# Patient Record
Sex: FEMALE | Race: WHITE | NOT HISPANIC OR LATINO | Employment: OTHER | URBAN - NONMETROPOLITAN AREA
[De-identification: names, ages, dates, MRNs, and addresses within clinical notes are randomized per-mention and may not be internally consistent; named-entity substitution may affect disease eponyms.]

---

## 2017-01-04 DIAGNOSIS — M54.16 LUMBAR RADICULOPATHY: Primary | ICD-10-CM

## 2017-01-12 ENCOUNTER — HOSPITAL ENCOUNTER (OUTPATIENT)
Dept: INTERVENTIONAL RADIOLOGY/VASCULAR | Facility: HOSPITAL | Age: 52
Discharge: HOME | End: 2017-01-12
Attending: ORTHOPAEDIC SURGERY | Admitting: ORTHOPAEDIC SURGERY
Payer: MEDICARE

## 2017-01-12 PROCEDURE — 25000125 ZZHC RX 250: Performed by: RADIOLOGY

## 2017-01-12 PROCEDURE — 62323 NJX INTERLAMINAR LMBR/SAC: CPT | Mod: TC

## 2017-01-12 RX ORDER — LIDOCAINE HYDROCHLORIDE 10 MG/ML
INJECTION, SOLUTION EPIDURAL; INFILTRATION; INTRACAUDAL; PERINEURAL
Status: DISCONTINUED
Start: 2017-01-12 | End: 2017-01-12 | Stop reason: WASHOUT

## 2017-01-12 RX ORDER — METHYLPREDNISOLONE ACETATE 80 MG/ML
80 INJECTION, SUSPENSION INTRA-ARTICULAR; INTRALESIONAL; INTRAMUSCULAR; SOFT TISSUE ONCE
Status: COMPLETED | OUTPATIENT
Start: 2017-01-12 | End: 2017-01-12

## 2017-01-12 RX ORDER — METHYLPREDNISOLONE ACETATE 80 MG/ML
INJECTION, SUSPENSION INTRA-ARTICULAR; INTRALESIONAL; INTRAMUSCULAR; SOFT TISSUE
Status: DISCONTINUED
Start: 2017-01-12 | End: 2017-01-13 | Stop reason: HOSPADM

## 2017-01-12 RX ORDER — LIDOCAINE HYDROCHLORIDE 10 MG/ML
INJECTION, SOLUTION EPIDURAL; INFILTRATION; INTRACAUDAL; PERINEURAL
Status: DISCONTINUED
Start: 2017-01-12 | End: 2017-01-13 | Stop reason: HOSPADM

## 2017-01-12 RX ORDER — IOPAMIDOL 612 MG/ML
15 INJECTION, SOLUTION INTRATHECAL ONCE
Status: COMPLETED | OUTPATIENT
Start: 2017-01-12 | End: 2017-01-12

## 2017-01-12 RX ADMIN — METHYLPREDNISOLONE ACETATE 80 MG: 80 INJECTION, SUSPENSION INTRA-ARTICULAR; INTRALESIONAL; INTRAMUSCULAR; SOFT TISSUE at 13:44

## 2017-01-12 RX ADMIN — IOPAMIDOL 3 ML: 612 INJECTION, SOLUTION INTRATHECAL at 13:43

## 2017-01-12 NOTE — PROGRESS NOTES
Fluoro time for this case was 21 seconds, less than 5 min  Was patient held? NO  If yes, by whom?  NA and Technologist NAME

## 2017-01-12 NOTE — DISCHARGE INSTRUCTIONS
Home number on file [phone] (home)  Is it ok to leave a message if you are not home yes    Dr Roe completed your karon lumbar procedure on 1/12/2017.    Current Pain Level (0-10 Scale): 4/10  Post Pain Level (0-10):  2/10    Patient will be contacted by a diagnostic imaging nurse via telephone for follow up on pain levels in 2 weeks.    Radiology Discharge instructions for Steroid Injection    Activity Level:     Do not do any heavy activity or exercise for 24 hours.   Do not drive for 4 hours after your injection.  Diet:   Return to your normal diet.  Medications:   If you have stopped taking your Aspirin, Coumadin/Warfarin, Ibuprofen, or any   other blood thinner for this procedure you may resume in the morning unless   your primary care provider has given you other instructions.    Diabetics may see an increase in blood sugar after steroid injections. If you are concerned about your blood sugar, please contact your family doctor.    Site Care:  Remove the bandage and bathe or shower the morning after the procedure.      Call your Primary Care Provider if you have the following (if your primary care provider is not available please seek emergency care):   Nausea with vomiting   Severe headache   Drowsiness or confusion   Redness or drainage at the injection or puncture site   Temperature over 101 degrees F   Other concerns   Worsening back pain   Stiff neck    If you have any questions or concerns, please call [phone].

## 2017-01-12 NOTE — IP AVS SNAPSHOT
[facility]    [address] [place] [address]    Phone:  [phone]    Fax:  [phone]                                       After Visit Summary   1/12/2017    Elizabeth Rankin    MRN: [id number]           After Visit Summary Signature Page     I have received my discharge instructions, and my questions have been answered. I have discussed any challenges I see with this plan with the nurse or doctor.    ..........................................................................................................................................  Patient/Patient Representative Signature      ..........................................................................................................................................  Patient Representative Print Name and Relationship to Patient    ..................................................               ................................................  Date                                            Time    ..........................................................................................................................................  Reviewed by Signature/Title    ...................................................              ..............................................  Date                                                            Time

## 2017-01-12 NOTE — IP AVS SNAPSHOT
MRN:[id number]                      After Visit Summary   1/12/2017    Elizabeth Rankin    MRN: [id number]           Visit Information        Provider Department      1/12/2017  1:00 PM Radiologist, Need Interventional; [facility]           Review of your medicines      UNREVIEWED medicines. Ask your doctor about these medicines        Dose / Directions    adalimumab 40 MG/0.8ML prefilled syringe kit   Commonly known as:  HUMIRA        Dose:  40 mg   Inject 40 mg Subcutaneous every 7 days   Refills:  0       fluticasone 50 MCG/ACT spray   Commonly known as:  FLONASE   Used for:  Dysfunction of eustachian tube, bilateral        Dose:  2 spray   Spray 2 sprays into both nostrils daily   Quantity:  1 Bottle   Refills:  11       IRON SUPPLEMENT PO        Dose:  325 mg   Take 325 mg by mouth daily (with breakfast)   Refills:  0       metFORMIN 1000 MG tablet   Commonly known as:  GLUCOPHAGE        Dose:  1 tablet   Take 1 tablet by mouth 2 times daily (with meals).   Refills:  0       MULTIVITAMIN PO        Dose:  1 tablet   Take 1 tablet by mouth daily   Refills:  0       VITAMIN D3 PO        Dose:  5000 Units   Take 5,000 Units by mouth daily   Refills:  0                Protect others around you: Learn how to safely use, store and throw away your medicines at www.disposemymeds.org.         Follow-ups after your visit        Your next 10 appointments already scheduled     Aug 01, 2017  9:00 AM   Hearing Evaluation with ZENAIDA Hernandez   [facility] ([facility])    [address] [place] [address] Gloria Hernandes [place] [id number] [phone]           Please see your medical professional for ear cleaning prior to this appointment if you believe wax buildup may be an issue. All patients are required to have a physician's order stating the medical reason for the hearing test. Your doctor can send an electronic order, use their own form or we have provided a form  (called Physician's Order for Audiology Services). It states that there is a medical reason for your exam. Without an order you may need to be rescheduled until the order can be obtained.               Care Instructions        Further instructions from your care team       Home number on file [phone] (home)  Is it ok to leave a message if you are not home yes    Dr Roe completed your karon lumbar procedure on 1/12/2017.    Current Pain Level (0-10 Scale): 4/10  Post Pain Level (0-10):  2/10    Patient will be contacted by a diagnostic imaging nurse via telephone for follow up on pain levels in 2 weeks.    Radiology Discharge instructions for Steroid Injection    Activity Level:     Do not do any heavy activity or exercise for 24 hours.   Do not drive for 4 hours after your injection.  Diet:   Return to your normal diet.  Medications:   If you have stopped taking your Aspirin, Coumadin/Warfarin, Ibuprofen, or any   other blood thinner for this procedure you may resume in the morning unless   your primary care provider has given you other instructions.    Diabetics may see an increase in blood sugar after steroid injections. If you are concerned about your blood sugar, please contact your family doctor.    Site Care:  Remove the bandage and bathe or shower the morning after the procedure.      Call your Primary Care Provider if you have the following (if your primary care provider is not available please seek emergency care):   Nausea with vomiting   Severe headache   Drowsiness or confusion   Redness or drainage at the injection or puncture site   Temperature over 101 degrees F   Other concerns   Worsening back pain   Stiff neck    If you have any questions or concerns, please call [phone].        Additional Information About Your Visit        [employer] Information     [employer] lets you send messages to your doctor, view your test results, renew your prescriptions, schedule appointments and more. To sign  "up, go to [email or web address] . Click on \"Log in\" on the left side of the screen, which will take you to the Welcome page. Then click on \"Sign up Now\" on the right side of the page.     You will be asked to enter the access code listed below, as well as some personal information. Please follow the directions to create your username and password.     Your access code is: [email or web address]  Expires: 2017  1:00 PM     Your access code will  in 90 days. If you need help or a new code, please call your [facility] clinic or [phone].        Care EveryWhere ID     This is your Care EveryWhere ID. This could be used by other organizations to access your [facility] medical records  [id number]         Primary Care Provider Office Phone # Fax #    Danae Yusuf [phone]      Thank you!     Thank you for choosing [facility] for your care. Our goal is always to provide you with excellent care. Hearing back from our patients is one way we can continue to improve our services. Please take a few minutes to complete the written survey that you may receive in the mail after you visit with us. Thank you!             Medication List: This is a list of all your medications and when to take them. Check marks below indicate your daily home schedule. Keep this list as a reference.      Medications           Morning Afternoon Evening Bedtime As Needed    adalimumab 40 MG/0.8ML prefilled syringe kit   Commonly known as:  HUMIRA   Inject 40 mg Subcutaneous every 7 days                                fluticasone 50 MCG/ACT spray   Commonly known as:  FLONASE   Spray 2 sprays into both nostrils daily                                IRON SUPPLEMENT PO   Take 325 mg by mouth daily (with breakfast)                                metFORMIN 1000 MG tablet   Commonly known as:  GLUCOPHAGE   Take 1 tablet by mouth 2 times daily (with meals).                                MULTIVITAMIN PO   Take 1 tablet by mouth " daily                                VITAMIN D3 PO   Take 5,000 Units by mouth daily

## 2017-01-12 NOTE — PROGRESS NOTES
Injection Documentation of Provider History    PER-PROVIDER HISTORY, Dr. schroeder:  Is this for treatment of acute herpes zoster, post herpetic neuralgia, post-decompressive radiculitis, or post-surgical scarring?   No  Does the patient have radiculopathy or sciatica?  Yes  How long has the patient had any physical therapy, home therapy or chiropractor care?  0 weeks.  How long has the patient taken NSaids or muscle relaxants?  1 weeks.  Is there any history of systemic/local infection or unstable medical conditions?  No

## 2017-02-03 DIAGNOSIS — M16.12 PRIMARY OSTEOARTHRITIS OF LEFT HIP: Primary | ICD-10-CM

## 2017-02-07 ENCOUNTER — HOSPITAL ENCOUNTER (OUTPATIENT)
Dept: INTERVENTIONAL RADIOLOGY/VASCULAR | Facility: HOSPITAL | Age: 52
Discharge: HOME | End: 2017-02-07
Attending: ORTHOPAEDIC SURGERY | Admitting: ORTHOPAEDIC SURGERY
Payer: MEDICARE

## 2017-02-07 PROCEDURE — 20610 DRAIN/INJ JOINT/BURSA W/O US: CPT | Mod: TC,LT

## 2017-02-07 PROCEDURE — 25000125 ZZHC RX 250

## 2017-02-07 PROCEDURE — 77002 NEEDLE LOCALIZATION BY XRAY: CPT | Mod: TC,LT

## 2017-02-07 RX ORDER — LIDOCAINE HYDROCHLORIDE 10 MG/ML
10 INJECTION, SOLUTION INFILTRATION; PERINEURAL ONCE
Status: COMPLETED | OUTPATIENT
Start: 2017-02-07 | End: 2017-02-07

## 2017-02-07 RX ORDER — TRIAMCINOLONE ACETONIDE 40 MG/ML
INJECTION, SUSPENSION INTRA-ARTICULAR; INTRAMUSCULAR
Status: DISPENSED
Start: 2017-02-07 | End: 2017-02-07

## 2017-02-07 RX ORDER — LIDOCAINE HYDROCHLORIDE 10 MG/ML
INJECTION, SOLUTION EPIDURAL; INFILTRATION; INTRACAUDAL; PERINEURAL
Status: DISPENSED
Start: 2017-02-07 | End: 2017-02-07

## 2017-02-07 RX ORDER — TRIAMCINOLONE ACETONIDE 40 MG/ML
40 INJECTION, SUSPENSION INTRA-ARTICULAR; INTRAMUSCULAR ONCE
Status: COMPLETED | OUTPATIENT
Start: 2017-02-07 | End: 2017-02-07

## 2017-02-07 RX ORDER — IOPAMIDOL 612 MG/ML
50 INJECTION, SOLUTION INTRAVASCULAR ONCE
Status: COMPLETED | OUTPATIENT
Start: 2017-02-07 | End: 2017-02-07

## 2017-02-07 RX ADMIN — LIDOCAINE HYDROCHLORIDE 8 ML: 10 INJECTION, SOLUTION EPIDURAL; INFILTRATION; INTRACAUDAL; PERINEURAL at 09:12

## 2017-02-07 RX ADMIN — IOPAMIDOL 3 ML: 612 INJECTION, SOLUTION INTRAVASCULAR at 09:11

## 2017-02-07 RX ADMIN — TRIAMCINOLONE ACETONIDE 40 MG: 40 INJECTION, SUSPENSION INTRA-ARTICULAR; INTRAMUSCULAR at 09:13

## 2017-02-07 NOTE — IP AVS SNAPSHOT
[facility]    [address] [place] [address]    Phone:  [phone]    Fax:  [phone]                                       After Visit Summary   2/7/2017    Elizabeth Rankin    MRN: [id number]           After Visit Summary Signature Page     I have received my discharge instructions, and my questions have been answered. I have discussed any challenges I see with this plan with the nurse or doctor.    ..........................................................................................................................................  Patient/Patient Representative Signature      ..........................................................................................................................................  Patient Representative Print Name and Relationship to Patient    ..................................................               ................................................  Date                                            Time    ..........................................................................................................................................  Reviewed by Signature/Title    ...................................................              ..............................................  Date                                                            Time

## 2017-02-07 NOTE — PROGRESS NOTES
Fluoro time for this case was 10 seconds, less than 5 min  Was patient held? NO  If yes, by whom?

## 2017-02-07 NOTE — DISCHARGE INSTRUCTIONS
Home number on file [phone] (home)  Is it ok to leave a message if you are not home   yes    Dr Roe completed your left hip injection procedure on 2/7/2017.    Current Pain Level (0-10 Scale): 3/10  Post Pain Level (0-10):  5/10    Patient will be contacted by a diagnostic imaging nurse via telephone for follow up on pain levels in 2 weeks.    Radiology Discharge instructions for Steroid Injection    Activity Level:     Do not do any heavy activity or exercise for 24 hours.   Hip Injections:  Do not drive for 4 hours after your injection.  Diet:   Return to your normal diet.  Medications:   If you have stopped taking your Aspirin, Coumadin/Warfarin, Ibuprofen, or any   other blood thinner for this procedure you may resume in the morning unless   your primary care provider has given you other instructions.    Diabetics may see an increase in blood sugar after steroid injections. If you are concerned about your blood sugar, please contact your family doctor.    Site Care:  Remove the bandage and bathe or shower the morning after the procedure.      Call your Primary Care Provider if you have the following (if your primary care provider is not available please seek emergency care):   Nausea with vomiting   Severe headache   Drowsiness or confusion   Redness or drainage at the injection or puncture site   Temperature over 101 degrees F   Other concerns   Increasing joint pain       If you have any questions or concerns, please call [phone].

## 2017-02-07 NOTE — IP AVS SNAPSHOT
MRN:[id number]                      After Visit Summary   2/7/2017    Elizabeth Rankin    MRN: [id number]           Visit Information        Provider Department      2/7/2017  8:30 AM Radiologist, Need Interventional; [facility]           Review of your medicines      UNREVIEWED medicines. Ask your doctor about these medicines        Dose / Directions    adalimumab 40 MG/0.8ML prefilled syringe kit   Commonly known as:  HUMIRA        Dose:  40 mg   Inject 40 mg Subcutaneous every 7 days   Refills:  0       fluticasone 50 MCG/ACT spray   Commonly known as:  FLONASE   Used for:  Dysfunction of eustachian tube, bilateral        Dose:  2 spray   Spray 2 sprays into both nostrils daily   Quantity:  1 Bottle   Refills:  11       IRON SUPPLEMENT PO        Dose:  325 mg   Take 325 mg by mouth daily (with breakfast)   Refills:  0       metFORMIN 1000 MG tablet   Commonly known as:  GLUCOPHAGE        Dose:  1 tablet   Take 1 tablet by mouth 2 times daily (with meals).   Refills:  0       MULTIVITAMIN PO        Dose:  1 tablet   Take 1 tablet by mouth daily   Refills:  0       VITAMIN D3 PO        Dose:  5000 Units   Take 5,000 Units by mouth daily   Refills:  0                Protect others around you: Learn how to safely use, store and throw away your medicines at www.disposemymeds.org.         Follow-ups after your visit        Your next 10 appointments already scheduled     Aug 01, 2017  9:00 AM   Hearing Evaluation with ZENAIDA Hernandez   [facility] ([facility])    [address] [place] [address] Gloria Hern[place] [id number] [phone]           Please see your medical professional for ear cleaning prior to this appointment if you believe wax buildup may be an issue. All patients are required to have a physician's order stating the medical reason for the hearing test. Your doctor can send an electronic order, use their own form or we have provided a form  (called Physician's Order for Audiology Services). It states that there is a medical reason for your exam. Without an order you may need to be rescheduled until the order can be obtained.               Care Instructions        Further instructions from your care team       Home number on file [phone] (home)  Is it ok to leave a message if you are not home   yes    Dr Roe completed your left hip injection procedure on 2/7/2017.    Current Pain Level (0-10 Scale): 3/10  Post Pain Level (0-10):  5/10    Patient will be contacted by a diagnostic imaging nurse via telephone for follow up on pain levels in 2 weeks.    Radiology Discharge instructions for Steroid Injection    Activity Level:     Do not do any heavy activity or exercise for 24 hours.   Hip Injections:  Do not drive for 4 hours after your injection.  Diet:   Return to your normal diet.  Medications:   If you have stopped taking your Aspirin, Coumadin/Warfarin, Ibuprofen, or any   other blood thinner for this procedure you may resume in the morning unless   your primary care provider has given you other instructions.    Diabetics may see an increase in blood sugar after steroid injections. If you are concerned about your blood sugar, please contact your family doctor.    Site Care:  Remove the bandage and bathe or shower the morning after the procedure.      Call your Primary Care Provider if you have the following (if your primary care provider is not available please seek emergency care):   Nausea with vomiting   Severe headache   Drowsiness or confusion   Redness or drainage at the injection or puncture site   Temperature over 101 degrees F   Other concerns   Increasing joint pain       If you have any questions or concerns, please call [phone].        Additional Information About Your Visit        [employer] Information     [employer] lets you send messages to your doctor, view your test results, renew your prescriptions, schedule appointments  "and more. To sign up, go to [email or web address] . Click on \"Log in\" on the left side of the screen, which will take you to the Welcome page. Then click on \"Sign up Now\" on the right side of the page.     You will be asked to enter the access code listed below, as well as some personal information. Please follow the directions to create your username and password.     Your access code is: [email or web address]  Expires: 2017  9:15 AM     Your access code will  in 90 days. If you need help or a new code, please call your [facility] clinic or [phone].        Care EveryWhere ID     This is your Care EveryWhere ID. This could be used by other organizations to access your [facility] medical records  [id number]         Primary Care Provider Office Phone # Fax #    Danae Yusuf [phone]      Thank you!     Thank you for choosing [facility] for your care. Our goal is always to provide you with excellent care. Hearing back from our patients is one way we can continue to improve our services. Please take a few minutes to complete the written survey that you may receive in the mail after you visit with us. Thank you!             Medication List: This is a list of all your medications and when to take them. Check marks below indicate your daily home schedule. Keep this list as a reference.      Medications           Morning Afternoon Evening Bedtime As Needed    adalimumab 40 MG/0.8ML prefilled syringe kit   Commonly known as:  HUMIRA   Inject 40 mg Subcutaneous every 7 days                                fluticasone 50 MCG/ACT spray   Commonly known as:  FLONASE   Spray 2 sprays into both nostrils daily                                IRON SUPPLEMENT PO   Take 325 mg by mouth daily (with breakfast)                                metFORMIN 1000 MG tablet   Commonly known as:  GLUCOPHAGE   Take 1 tablet by mouth 2 times daily (with meals).                                MULTIVITAMIN PO   Take 1 " tablet by mouth daily                                VITAMIN D3 PO   Take 5,000 Units by mouth daily

## 2017-02-14 ENCOUNTER — ALLIED HEALTH/NURSE VISIT (OUTPATIENT)
Dept: AUDIOLOGY | Facility: OTHER | Age: 52
End: 2017-02-14
Attending: AUDIOLOGIST
Payer: MEDICARE

## 2017-02-14 DIAGNOSIS — H90.3 SENSORINEURAL HEARING LOSS, BILATERAL: Primary | ICD-10-CM

## 2017-02-14 PROCEDURE — V5266 BATTERY FOR HEARING DEVICE: HCPCS

## 2017-02-20 DIAGNOSIS — M47.816 LUMBAR SPONDYLOSIS: Primary | ICD-10-CM

## 2017-02-21 ENCOUNTER — TELEPHONE (OUTPATIENT)
Dept: INTERVENTIONAL RADIOLOGY/VASCULAR | Facility: HOSPITAL | Age: 52
End: 2017-02-21

## 2017-02-27 ENCOUNTER — HOSPITAL ENCOUNTER (OUTPATIENT)
Dept: INTERVENTIONAL RADIOLOGY/VASCULAR | Facility: HOSPITAL | Age: 52
Discharge: HOME | End: 2017-02-27
Attending: PHYSICAL MEDICINE & REHABILITATION | Admitting: PHYSICAL MEDICINE & REHABILITATION
Payer: MEDICARE

## 2017-02-27 PROCEDURE — 25000128 H RX IP 250 OP 636: Performed by: RADIOLOGY

## 2017-02-27 PROCEDURE — 25000125 ZZHC RX 250: Performed by: RADIOLOGY

## 2017-02-27 PROCEDURE — 62323 NJX INTERLAMINAR LMBR/SAC: CPT | Mod: TC

## 2017-02-27 RX ORDER — METHYLPREDNISOLONE ACETATE 80 MG/ML
80 INJECTION, SUSPENSION INTRA-ARTICULAR; INTRALESIONAL; INTRAMUSCULAR; SOFT TISSUE ONCE
Status: COMPLETED | OUTPATIENT
Start: 2017-02-27 | End: 2017-02-27

## 2017-02-27 RX ORDER — LIDOCAINE HYDROCHLORIDE 10 MG/ML
INJECTION, SOLUTION EPIDURAL; INFILTRATION; INTRACAUDAL; PERINEURAL
Status: DISCONTINUED
Start: 2017-02-27 | End: 2017-02-28 | Stop reason: HOSPADM

## 2017-02-27 RX ORDER — IOPAMIDOL 612 MG/ML
15 INJECTION, SOLUTION INTRATHECAL ONCE
Status: COMPLETED | OUTPATIENT
Start: 2017-02-27 | End: 2017-02-27

## 2017-02-27 RX ORDER — METHYLPREDNISOLONE ACETATE 80 MG/ML
INJECTION, SUSPENSION INTRA-ARTICULAR; INTRALESIONAL; INTRAMUSCULAR; SOFT TISSUE
Status: DISCONTINUED
Start: 2017-02-27 | End: 2017-02-28 | Stop reason: HOSPADM

## 2017-02-27 RX ADMIN — LIDOCAINE HYDROCHLORIDE 3 ML: 10 INJECTION, SOLUTION EPIDURAL; INFILTRATION; INTRACAUDAL; PERINEURAL at 12:23

## 2017-02-27 RX ADMIN — METHYLPREDNISOLONE ACETATE 80 MG: 80 INJECTION, SUSPENSION INTRA-ARTICULAR; INTRALESIONAL; INTRAMUSCULAR; SOFT TISSUE at 12:23

## 2017-02-27 RX ADMIN — IOPAMIDOL 3 ML: 612 INJECTION, SOLUTION INTRATHECAL at 12:22

## 2017-02-27 NOTE — PROGRESS NOTES
Fluoro time for this case was 19 seconds, less than 5 min  Was patient held? NO  If yes, by whom? NA

## 2017-02-27 NOTE — DISCHARGE INSTRUCTIONS
Home number on file [phone] (home)  Is it ok to leave a message if you are not home yes    Dr Roe completed your karon lumbar procedure on 2/27/2017.    Current Pain Level (0-10 Scale): 4/10  Post Pain Level (0-10):  2/10    Patient will be contacted by a diagnostic imaging nurse via telephone for follow up on pain levels in 2 weeks.    Radiology Discharge instructions for Steroid Injection    Activity Level:     Do not do any heavy activity or exercise for 24 hours.   Do not drive for 4 hours after your injection.  Diet:   Return to your normal diet.  Medications:   If you have stopped taking your Aspirin, Coumadin/Warfarin, Ibuprofen, or any   other blood thinner for this procedure you may resume in the morning unless   your primary care provider has given you other instructions.    Diabetics may see an increase in blood sugar after steroid injections. If you are concerned about your blood sugar, please contact your family doctor.    Site Care:  Remove the bandage and bathe or shower the morning after the procedure.      Call your Primary Care Provider if you have the following (if your primary care provider is not available please seek emergency care):   Nausea with vomiting   Severe headache   Drowsiness or confusion   Redness or drainage at the injection or puncture site   Temperature over 101 degrees F   Other concerns   Worsening back pain   Stiff neck    If you have any questions or concerns, please call [phone].

## 2017-02-27 NOTE — IP AVS SNAPSHOT
[facility]    [address] [place] [address]    Phone:  [phone]    Fax:  [phone]                                       After Visit Summary   2/27/2017    Elizabeth Rankin    MRN: [id number]           After Visit Summary Signature Page     I have received my discharge instructions, and my questions have been answered. I have discussed any challenges I see with this plan with the nurse or doctor.    ..........................................................................................................................................  Patient/Patient Representative Signature      ..........................................................................................................................................  Patient Representative Print Name and Relationship to Patient    ..................................................               ................................................  Date                                            Time    ..........................................................................................................................................  Reviewed by Signature/Title    ...................................................              ..............................................  Date                                                            Time

## 2017-02-27 NOTE — IP AVS SNAPSHOT
MRN:[id number]                      After Visit Summary   2/27/2017    Elizabeth Rankin    MRN: [id number]           Visit Information        Provider Department      2/27/2017 11:30 AM Radiologist, Need Interventional; [facility] INTERVENTIONAL ROOM [facility] Inter[facility] Radiology           Review of your medicines      UNREVIEWED medicines. Ask your doctor about these medicines        Dose / Directions    adalimumab 40 MG/0.8ML prefilled syringe kit   Commonly known as:  HUMIRA        Dose:  40 mg   Inject 40 mg Subcutaneous every 7 days   Refills:  0       fluticasone 50 MCG/ACT spray   Commonly known as:  FLONASE   Used for:  Dysfunction of eustachian tube, bilateral        Dose:  2 spray   Spray 2 sprays into both nostrils daily   Quantity:  1 Bottle   Refills:  11       IRON SUPPLEMENT PO        Dose:  325 mg   Take 325 mg by mouth daily (with breakfast)   Refills:  0       metFORMIN 1000 MG tablet   Commonly known as:  GLUCOPHAGE        Dose:  1 tablet   Take 1 tablet by mouth 2 times daily (with meals).   Refills:  0       MULTIVITAMIN PO        Dose:  1 tablet   Take 1 tablet by mouth daily   Refills:  0       VITAMIN D3 PO        Dose:  5000 Units   Take 5,000 Units by mouth daily   Refills:  0                Protect others around you: Learn how to safely use, store and throw away your medicines at www.disposemymeds.org.         Follow-ups after your visit        Your next 10 appointments already scheduled     Aug 01, 2017  9:00 AM CDT   Hearing Evaluation with Garcia Hernandez   [facility] ([facility])    [address] [place] [address] Gloria [place] [address] [phone]           Please see your medical professional for ear cleaning prior to this appointment if you believe wax buildup may be an issue. All patients are required to have a physician's order stating the medical reason for the hearing test. Your doctor can send an electronic order, use their own form or we have provided a form  (called Physician's Order for Audiology Services). It states that there is a medical reason for your exam. Without an order you may need to be rescheduled until the order can be obtained.               Care Instructions        Further instructions from your care team       Home number on file [phone] (home)  Is it ok to leave a message if you are not home yes    Dr Roe completed your karon lumbar procedure on 2/27/2017.    Current Pain Level (0-10 Scale): 4/10  Post Pain Level (0-10):  2/10    Patient will be contacted by a diagnostic imaging nurse via telephone for follow up on pain levels in 2 weeks.    Radiology Discharge instructions for Steroid Injection    Activity Level:     Do not do any heavy activity or exercise for 24 hours.   Do not drive for 4 hours after your injection.  Diet:   Return to your normal diet.  Medications:   If you have stopped taking your Aspirin, Coumadin/Warfarin, Ibuprofen, or any   other blood thinner for this procedure you may resume in the morning unless   your primary care provider has given you other instructions.    Diabetics may see an increase in blood sugar after steroid injections. If you are concerned about your blood sugar, please contact your family doctor.    Site Care:  Remove the bandage and bathe or shower the morning after the procedure.      Call your Primary Care Provider if you have the following (if your primary care provider is not available please seek emergency care):   Nausea with vomiting   Severe headache   Drowsiness or confusion   Redness or drainage at the injection or puncture site   Temperature over 101 degrees F   Other concerns   Worsening back pain   Stiff neck    If you have any questions or concerns, please call [phone].        Additional Information About Your Visit        [facility] Information     [employer] lets you send messages to your doctor, view your test results, renew your prescriptions, schedule appointments and more. To sign  "up, go to [email or web address] . Click on \"Log in\" on the left side of the screen, which will take you to the Welcome page. Then click on \"Sign up Now\" on the right side of the page.     You will be asked to enter the access code listed below, as well as some personal information. Please follow the directions to create your username and password.     Your access code is: [email or web address]  Expires: 2017  9:15 AM     Your access code will  in 90 days. If you need help or a new code, please call your [facility] clinic or [phone].        Care EveryWhere ID     This is your Care EveryWhere ID. This could be used by other organizations to access your [facility] medical records  [id number]         Primary Care Provider Office Phone # Fax #    Danae Yusuf [phone]      Thank you!     Thank you for choosing [facility] for your care. Our goal is always to provide you with excellent care. Hearing back from our patients is one way we can continue to improve our services. Please take a few minutes to complete the written survey that you may receive in the mail after you visit with us. Thank you!             Medication List: This is a list of all your medications and when to take them. Check marks below indicate your daily home schedule. Keep this list as a reference.      Medications           Morning Afternoon Evening Bedtime As Needed    adalimumab 40 MG/0.8ML prefilled syringe kit   Commonly known as:  HUMIRA   Inject 40 mg Subcutaneous every 7 days                                fluticasone 50 MCG/ACT spray   Commonly known as:  FLONASE   Spray 2 sprays into both nostrils daily                                IRON SUPPLEMENT PO   Take 325 mg by mouth daily (with breakfast)                                metFORMIN 1000 MG tablet   Commonly known as:  GLUCOPHAGE   Take 1 tablet by mouth 2 times daily (with meals).                                MULTIVITAMIN PO   Take 1 tablet by mouth " daily                                VITAMIN D3 PO   Take 5,000 Units by mouth daily

## 2017-03-30 DIAGNOSIS — Z96.642 PRESENCE OF LEFT ARTIFICIAL HIP JOINT: ICD-10-CM

## 2017-03-30 DIAGNOSIS — M16.12 PRIMARY OSTEOARTHRITIS OF LEFT HIP: Primary | ICD-10-CM

## 2017-04-06 ENCOUNTER — HOSPITAL ENCOUNTER (OUTPATIENT)
Dept: INTERVENTIONAL RADIOLOGY/VASCULAR | Facility: HOSPITAL | Age: 52
Discharge: HOME | End: 2017-04-06
Attending: ORTHOPAEDIC SURGERY | Admitting: ORTHOPAEDIC SURGERY
Payer: MEDICARE

## 2017-04-06 PROCEDURE — 77002 NEEDLE LOCALIZATION BY XRAY: CPT | Mod: TC

## 2017-04-06 PROCEDURE — 25000125 ZZHC RX 250: Performed by: RADIOLOGY

## 2017-04-06 PROCEDURE — 20610 DRAIN/INJ JOINT/BURSA W/O US: CPT | Mod: TC,LT

## 2017-04-06 RX ORDER — LIDOCAINE HYDROCHLORIDE 10 MG/ML
10 INJECTION, SOLUTION INFILTRATION; PERINEURAL ONCE
Status: COMPLETED | OUTPATIENT
Start: 2017-04-06 | End: 2017-04-06

## 2017-04-06 RX ORDER — IOPAMIDOL 612 MG/ML
15 INJECTION, SOLUTION INTRATHECAL ONCE
Status: COMPLETED | OUTPATIENT
Start: 2017-04-06 | End: 2017-04-06

## 2017-04-06 RX ORDER — LIDOCAINE HYDROCHLORIDE 10 MG/ML
INJECTION, SOLUTION EPIDURAL; INFILTRATION; INTRACAUDAL; PERINEURAL
Status: DISCONTINUED
Start: 2017-04-06 | End: 2017-04-07 | Stop reason: HOSPADM

## 2017-04-06 RX ORDER — TRIAMCINOLONE ACETONIDE 40 MG/ML
40 INJECTION, SUSPENSION INTRA-ARTICULAR; INTRAMUSCULAR ONCE
Status: COMPLETED | OUTPATIENT
Start: 2017-04-06 | End: 2017-04-06

## 2017-04-06 RX ORDER — TRIAMCINOLONE ACETONIDE 40 MG/ML
INJECTION, SUSPENSION INTRA-ARTICULAR; INTRAMUSCULAR
Status: DISCONTINUED
Start: 2017-04-06 | End: 2017-04-07 | Stop reason: HOSPADM

## 2017-04-06 RX ADMIN — IOPAMIDOL 3 ML: 612 INJECTION, SOLUTION INTRATHECAL at 15:58

## 2017-04-06 RX ADMIN — TRIAMCINOLONE ACETONIDE 40 MG: 40 INJECTION, SUSPENSION INTRA-ARTICULAR; INTRAMUSCULAR at 15:58

## 2017-04-06 RX ADMIN — LIDOCAINE HYDROCHLORIDE 10 ML: 10 INJECTION, SOLUTION EPIDURAL; INFILTRATION; INTRACAUDAL; PERINEURAL at 15:57

## 2017-04-06 NOTE — IP AVS SNAPSHOT
[facility] Interventional [facility]iology    [address] [place] [address]    Phone:  [phone]    Fax:  [phone]                                       After Visit Summary   4/6/2017    Elizabeth Rankin    MRN: [id number]           After Visit Summary Signature Page     I have received my discharge instructions, and my questions have been answered. I have discussed any challenges I see with this plan with the nurse or doctor.    ..........................................................................................................................................  Patient/Patient Representative Signature      ..........................................................................................................................................  Patient Representative Print Name and Relationship to Patient    ..................................................               ................................................  Date                                            Time    ..........................................................................................................................................  Reviewed by Signature/Title    ...................................................              ..............................................  Date                                                            Time

## 2017-04-06 NOTE — PROGRESS NOTES
Fluoro time for this case was 45 seconds, less than 5 min  Was patient held? NO  If yes, by whom?

## 2017-04-06 NOTE — DISCHARGE INSTRUCTIONS
Home number on file [phone] (home)  Is it ok to leave a message if you are not home   yes    Dr Roe completed your left hip injection procedure on 4/6/2017.    Current Pain Level (0-10 Scale): 4/10  Post Pain Level (0-10):  4/10    Patient will be contacted by a diagnostic imaging nurse via telephone for follow up on pain levels in 2 weeks.    Radiology Discharge instructions for Steroid Injection    Activity Level:     Do not do any heavy activity or exercise for 24 hours.   Do not drive for 4 hours after your injection.  Diet:   Return to your normal diet.  Medications:   If you have stopped taking your Aspirin, Coumadin/Warfarin, Ibuprofen, or any   other blood thinner for this procedure you may resume in the morning unless   your primary care provider has given you other instructions.    Diabetics may see an increase in blood sugar after steroid injections. If you are concerned about your blood sugar, please contact your family doctor.    Site Care:  Remove the bandage and bathe or shower the morning after the procedure.      Call your Primary Care Provider if you have the following (if your primary care provider is not available please seek emergency care):   Nausea with vomiting   Severe headache   Drowsiness or confusion   Redness or drainage at the injection or puncture site   Temperature over 101 degrees F   Other concerns   Worsening back pain   Stiff neck

## 2017-04-06 NOTE — IP AVS SNAPSHOT
MRN:[id number]                      After Visit Summary   4/6/2017    Elizabeth Rankin    MRN: [id number]           Visit Information        Provider Department      4/6/2017  3:30 PM Radiologist, Need Interventional; [facility] INTERVENTIONAL ROOM [facility] Interventional Radiology           Review of your medicines      UNREVIEWED medicines. Ask your doctor about these medicines        Dose / Directions    adalimumab 40 MG/0.8ML prefilled syringe kit   Commonly known as:  HUMIRA        Dose:  40 mg   Inject 40 mg Subcutaneous every 7 days   Refills:  0       fluticasone 50 MCG/ACT spray   Commonly known as:  FLONASE   Used for:  Dysfunction of eustachian tube, bilateral        Dose:  2 spray   Spray 2 sprays into both nostrils daily   Quantity:  1 Bottle   Refills:  11       IRON SUPPLEMENT PO        Dose:  325 mg   Take 325 mg by mouth daily (with breakfast)   Refills:  0       metFORMIN 1000 MG tablet   Commonly known as:  GLUCOPHAGE        Dose:  1 tablet   Take 1 tablet by mouth 2 times daily (with meals).   Refills:  0       MULTIVITAMIN PO        Dose:  1 tablet   Take 1 tablet by mouth daily   Refills:  0       VITAMIN D3 PO        Dose:  5000 Units   Take 5,000 Units by mouth daily   Refills:  0                Protect others around you: Learn how to safely use, store and throw away your medicines at www.disposemymeds.org.         Follow-ups after your visit        Your next 10 appointments already scheduled     Aug 01, 2017  9:00 AM CDT   Hearing Evaluation with Garcia Hernandez   [facility] ([facility])    [address] [place] [address] [place] [address] [phone]           Please see your medical professional for ear cleaning prior to this appointment if you believe wax buildup may be an issue. All patients are required to have a physician's order stating the medical reason for the hearing test. Your doctor can send an electronic order, use their own form or we have provided a form  "(called Physician's Order for Audiology Services). It states that there is a medical reason for your exam. Without an order you may need to be rescheduled until the order can be obtained.               Care Instructions        Further instructions from your care team       Home number on file [phone] (home)  Is it ok to leave a message if you are not home   yes    Dr Roe completed your left hip injection procedure on 4/6/2017.    Current Pain Level (0-10 Scale): 4/10  Post Pain Level (0-10):  4/10    Patient will be contacted by a diagnostic imaging nurse via telephone for follow up on pain levels in 2 weeks.    Radiology Discharge instructions for Steroid Injection    Activity Level:     Do not do any heavy activity or exercise for 24 hours.   Do not drive for 4 hours after your injection.  Diet:   Return to your normal diet.  Medications:   If you have stopped taking your Aspirin, Coumadin/Warfarin, Ibuprofen, or any   other blood thinner for this procedure you may resume in the morning unless   your primary care provider has given you other instructions.    Diabetics may see an increase in blood sugar after steroid injections. If you are concerned about your blood sugar, please contact your family doctor.    Site Care:  Remove the bandage and bathe or shower the morning after the procedure.      Call your Primary Care Provider if you have the following (if your primary care provider is not available please seek emergency care):   Nausea with vomiting   Severe headache   Drowsiness or confusion   Redness or drainage at the injection or puncture site   Temperature over 101 degrees F   Other concerns   Worsening back pain   Stiff neck         Additional Information About Your Visit        [employer] Information     [employer] lets you send messages to your doctor, view your test results, renew your prescriptions, schedule appointments and more. To sign up, go to [email or web address] . Click on \"Log in\" on " "the left side of the screen, which will take you to the Welcome page. Then click on \"Sign up Now\" on the right side of the page.     You will be asked to enter the access code listed below, as well as some personal information. Please follow the directions to create your username and password.     Your access code is: [email or web address]  Expires: 2017 10:15 AM     Your access code will  in 90 days. If you need help or a new code, please call your [facility] clinic or [phone].        Care EveryWhere ID     This is your Care EveryWhere ID. This could be used by other organizations to access your [facility] medical records  [id number]         Primary Care Provider Office Phone # Fax #    Danae Yusuf [phone]      Thank you!     Thank you for choosing [facility] for your care. Our goal is always to provide you with excellent care. Hearing back from our patients is one way we can continue to improve our services. Please take a few minutes to complete the written survey that you may receive in the mail after you visit with us. Thank you!             Medication List: This is a list of all your medications and when to take them. Check marks below indicate your daily home schedule. Keep this list as a reference.      Medications           Morning Afternoon Evening Bedtime As Needed    adalimumab 40 MG/0.8ML prefilled syringe kit   Commonly known as:  HUMIRA   Inject 40 mg Subcutaneous every 7 days                                fluticasone 50 MCG/ACT spray   Commonly known as:  FLONASE   Spray 2 sprays into both nostrils daily                                IRON SUPPLEMENT PO   Take 325 mg by mouth daily (with breakfast)                                metFORMIN 1000 MG tablet   Commonly known as:  GLUCOPHAGE   Take 1 tablet by mouth 2 times daily (with meals).                                MULTIVITAMIN PO   Take 1 tablet by mouth daily                                VITAMIN D3 PO   Take " 5,000 Units by mouth daily

## 2017-04-13 ENCOUNTER — TRANSFERRED RECORDS (OUTPATIENT)
Dept: HEALTH INFORMATION MANAGEMENT | Facility: HOSPITAL | Age: 52
End: 2017-04-13

## 2017-04-20 ENCOUNTER — TELEPHONE (OUTPATIENT)
Dept: INTERVENTIONAL RADIOLOGY/VASCULAR | Facility: HOSPITAL | Age: 52
End: 2017-04-20

## 2017-04-21 DIAGNOSIS — M79.606 PAIN OF LOWER EXTREMITY, UNSPECIFIED LATERALITY: ICD-10-CM

## 2017-04-21 DIAGNOSIS — M47.816 SPONDYLOSIS OF LUMBAR REGION WITHOUT MYELOPATHY OR RADICULOPATHY: ICD-10-CM

## 2017-04-21 DIAGNOSIS — M54.16 LUMBAR RADICULOPATHY: Primary | ICD-10-CM

## 2017-04-23 DIAGNOSIS — M47.816 LUMBAR SPONDYLOSIS: Primary | ICD-10-CM

## 2017-05-10 ENCOUNTER — HOSPITAL ENCOUNTER (OUTPATIENT)
Dept: INTERVENTIONAL RADIOLOGY/VASCULAR | Facility: HOSPITAL | Age: 52
Discharge: HOME | End: 2017-05-10
Attending: PHYSICAL MEDICINE & REHABILITATION | Admitting: PHYSICAL MEDICINE & REHABILITATION
Payer: MEDICARE

## 2017-05-10 PROCEDURE — 25000128 H RX IP 250 OP 636: Performed by: RADIOLOGY

## 2017-05-10 PROCEDURE — 62323 NJX INTERLAMINAR LMBR/SAC: CPT | Mod: TC

## 2017-05-10 RX ORDER — IOPAMIDOL 612 MG/ML
15 INJECTION, SOLUTION INTRATHECAL ONCE
Status: COMPLETED | OUTPATIENT
Start: 2017-05-10 | End: 2017-05-10

## 2017-05-10 RX ORDER — METHYLPREDNISOLONE ACETATE 80 MG/ML
INJECTION, SUSPENSION INTRA-ARTICULAR; INTRALESIONAL; INTRAMUSCULAR; SOFT TISSUE
Status: DISCONTINUED
Start: 2017-05-10 | End: 2017-05-11 | Stop reason: HOSPADM

## 2017-05-10 RX ORDER — METHYLPREDNISOLONE ACETATE 80 MG/ML
80 INJECTION, SUSPENSION INTRA-ARTICULAR; INTRALESIONAL; INTRAMUSCULAR; SOFT TISSUE ONCE
Status: COMPLETED | OUTPATIENT
Start: 2017-05-10 | End: 2017-05-10

## 2017-05-10 RX ADMIN — IOPAMIDOL 6 ML: 612 INJECTION, SOLUTION INTRATHECAL at 14:07

## 2017-05-10 RX ADMIN — METHYLPREDNISOLONE ACETATE 80 MG: 80 INJECTION, SUSPENSION INTRA-ARTICULAR; INTRALESIONAL; INTRAMUSCULAR; SOFT TISSUE at 14:07

## 2017-05-10 NOTE — PROGRESS NOTES
Fluoro time for this case was 10 seconds, less than 5 min  Was patient held? NO  If yes, by whom? NA

## 2017-05-10 NOTE — IP AVS SNAPSHOT
[facility] Inter[facility]iology    [address] [place] [address]    Phone:  [phone]    Fax:  [phone]                                       After Visit Summary   5/10/2017    Elizabeth Rankin    MRN: [id number]           After Visit Summary Signature Page     I have received my discharge instructions, and my questions have been answered. I have discussed any challenges I see with this plan with the nurse or doctor.    ..........................................................................................................................................  Patient/Patient Representative Signature      ..........................................................................................................................................  Patient Representative Print Name and Relationship to Patient    ..................................................               ................................................  Date                                            Time    ..........................................................................................................................................  Reviewed by Signature/Title    ...................................................              ..............................................  Date                                                            Time

## 2017-05-10 NOTE — IP AVS SNAPSHOT
MRN:[id number]                      After Visit Summary   5/10/2017    Elizabeth Rankin    MRN: [id number]           Visit Information        Provider Department      5/10/2017  1:30 PM Radiologist, Need Interventional; [facility] INTERVENTIONAL ROOM [facility] Interventional Radiology           Review of your medicines      UNREVIEWED medicines. Ask your doctor about these medicines        Dose / Directions    adalimumab 40 MG/0.8ML prefilled syringe kit   Commonly known as:  HUMIRA        Dose:  40 mg   Inject 40 mg Subcutaneous every 7 days   Refills:  0       fluticasone 50 MCG/ACT spray   Commonly known as:  FLONASE   Used for:  Dysfunction of eustachian tube, bilateral        Dose:  2 spray   Spray 2 sprays into both nostrils daily   Quantity:  1 Bottle   Refills:  11       IRON SUPPLEMENT PO        Dose:  325 mg   Take 325 mg by mouth daily (with breakfast)   Refills:  0       metFORMIN 1000 MG tablet   Commonly known as:  GLUCOPHAGE        Dose:  1 tablet   Take 1 tablet by mouth 2 times daily (with meals).   Refills:  0       MULTIVITAMIN PO        Dose:  1 tablet   Take 1 tablet by mouth daily   Refills:  0       VITAMIN D3 PO        Dose:  5000 Units   Take 5,000 Units by mouth daily   Refills:  0                Protect others around you: Learn how to safely use, store and throw away your medicines at www.disposemymeds.org.         Follow-ups after your visit        Your next 10 appointments already scheduled     Aug 01, 2017  9:00 AM CDT   Hearing Evaluation with Garcia Hernandez   [facility] ([facility])    [address] [place] [address] [place] [address] [phone]           Please see your medical professional for ear cleaning prior to this appointment if you believe wax buildup may be an issue. All patients are required to have a physician's order stating the medical reason for the hearing test. Your doctor can send an electronic order, use their own form or we have provided a form  "(called Physician's Order for Audiology Services). It states that there is a medical reason for your exam. Without an order you may need to be rescheduled until the order can be obtained.               Care Instructions        Further instructions from your care team       Home number on file [phone] (home)  Is it ok to leave a message if you are not home yes    Dr. Jimenez completed your karon lumbar procedure on 5/10/2017.    Current Pain Level (0-10 Scale): 3/10  Post Pain Level (0-10):  1/10    Patient will be contacted by a diagnostic imaging nurse via telephone for follow up on pain levels in 2 weeks.    Radiology Discharge instructions for Steroid Injection    Activity Level:     Do not do any heavy activity or exercise for 24 hours.   Do not drive for 4 hours after your injection.  Diet:   Return to your normal diet.  Medications:   If you have stopped taking your Aspirin, Coumadin/Warfarin, Ibuprofen, or any   other blood thinner for this procedure you may resume in the morning unless   your primary care provider has given you other instructions.    Diabetics may see an increase in blood sugar after steroid injections. If you are concerned about your blood sugar, please contact your family doctor.    Site Care:  Remove the bandage and bathe or shower the morning after the procedure.      Call your Primary Care Provider if you have the following (if your primary care provider is not available please seek emergency care):   Nausea with vomiting   Severe headache   Drowsiness or confusion   Redness or drainage at the injection or puncture site   Temperature over 101 degrees F   Other concerns   Worsening back pain   Stiff neck       Additional Information About Your Visit        [employer] Information     [employer] lets you send messages to your doctor, view your test results, renew your prescriptions, schedule appointments and more. To sign up, go to [email or web address] . Click on \"Log in\" on the left side " "of the screen, which will take you to the Welcome page. Then click on \"Sign up Now\" on the right side of the page.     You will be asked to enter the access code listed below, as well as some personal information. Please follow the directions to create your username and password.     Your access code is: [email or web address]  Expires: 2017  2:04 PM     Your access code will  in 90 days. If you need help or a new code, please call your [facility] clinic or [phone].        Care EveryWhere ID     This is your Care EveryWhere ID. This could be used by other organizations to access your [facility] medical records  [id number]         Primary Care Provider Office Phone # Fax #    Danae Yusuf [phone]      Thank you!     Thank you for choosing [facility] for your care. Our goal is always to provide you with excellent care. Hearing back from our patients is one way we can continue to improve our services. Please take a few minutes to complete the written survey that you may receive in the mail after you visit with us. Thank you!             Medication List: This is a list of all your medications and when to take them. Check marks below indicate your daily home schedule. Keep this list as a reference.      Medications           Morning Afternoon Evening Bedtime As Needed    adalimumab 40 MG/0.8ML prefilled syringe kit   Commonly known as:  HUMIRA   Inject 40 mg Subcutaneous every 7 days                                fluticasone 50 MCG/ACT spray   Commonly known as:  FLONASE   Spray 2 sprays into both nostrils daily                                IRON SUPPLEMENT PO   Take 325 mg by mouth daily (with breakfast)                                metFORMIN 1000 MG tablet   Commonly known as:  GLUCOPHAGE   Take 1 tablet by mouth 2 times daily (with meals).                                MULTIVITAMIN PO   Take 1 tablet by mouth daily                                VITAMIN D3 PO   Take 5,000 Units by " mouth daily

## 2017-05-10 NOTE — DISCHARGE INSTRUCTIONS
Home number on file [phone] (home)  Is it ok to leave a message if you are not home yes    Dr. Jimenez completed your karon lumbar procedure on 5/10/2017.    Current Pain Level (0-10 Scale): 3/10  Post Pain Level (0-10):  1/10    Patient will be contacted by a diagnostic imaging nurse via telephone for follow up on pain levels in 2 weeks.    Radiology Discharge instructions for Steroid Injection    Activity Level:     Do not do any heavy activity or exercise for 24 hours.   Do not drive for 4 hours after your injection.  Diet:   Return to your normal diet.  Medications:   If you have stopped taking your Aspirin, Coumadin/Warfarin, Ibuprofen, or any   other blood thinner for this procedure you may resume in the morning unless   your primary care provider has given you other instructions.    Diabetics may see an increase in blood sugar after steroid injections. If you are concerned about your blood sugar, please contact your family doctor.    Site Care:  Remove the bandage and bathe or shower the morning after the procedure.      Call your Primary Care Provider if you have the following (if your primary care provider is not available please seek emergency care):   Nausea with vomiting   Severe headache   Drowsiness or confusion   Redness or drainage at the injection or puncture site   Temperature over 101 degrees F   Other concerns   Worsening back pain   Stiff neck

## 2017-05-10 NOTE — PROGRESS NOTES
Verified post-procedural status.  There were no complicationsand patient has no symptoms..  The patient had no questions or concerns.Patient reports pain scale of 1/10, and No bleeding.

## 2017-05-17 ENCOUNTER — ALLIED HEALTH/NURSE VISIT (OUTPATIENT)
Dept: AUDIOLOGY | Facility: OTHER | Age: 52
End: 2017-05-17
Attending: AUDIOLOGIST
Payer: MEDICARE

## 2017-05-17 DIAGNOSIS — H90.3 SENSORINEURAL HEARING LOSS, BILATERAL: Primary | ICD-10-CM

## 2017-05-17 PROCEDURE — V5266 BATTERY FOR HEARING DEVICE: HCPCS

## 2017-05-24 ENCOUNTER — TELEPHONE (OUTPATIENT)
Dept: INTERVENTIONAL RADIOLOGY/VASCULAR | Facility: HOSPITAL | Age: 52
End: 2017-05-24

## 2017-06-21 LAB
CHOLEST SERPL-MCNC: 282 MG/DL
HDLC SERPL-MCNC: 75 MG/DL
LDLC SERPL CALC-MCNC: 149 MG/DL
TRIGL SERPL-MCNC: 189 MG/DL

## 2017-07-11 ENCOUNTER — TRANSFERRED RECORDS (OUTPATIENT)
Dept: HEALTH INFORMATION MANAGEMENT | Facility: HOSPITAL | Age: 52
End: 2017-07-11

## 2017-07-12 ENCOUNTER — HOSPITAL ENCOUNTER (OUTPATIENT)
Facility: HOSPITAL | Age: 52
End: 2017-07-12
Attending: INTERNAL MEDICINE | Admitting: INTERNAL MEDICINE
Payer: MEDICARE

## 2017-08-01 ENCOUNTER — OFFICE VISIT (OUTPATIENT)
Dept: AUDIOLOGY | Facility: OTHER | Age: 52
End: 2017-08-01
Attending: AUDIOLOGIST
Payer: MEDICARE

## 2017-08-01 DIAGNOSIS — H90.3 SENSORINEURAL HEARING LOSS, BILATERAL: Primary | ICD-10-CM

## 2017-08-01 PROCEDURE — 92593 ZZHC HEARING AID CHECK, BINAURAL: CPT | Performed by: AUDIOLOGIST

## 2017-08-01 PROCEDURE — 92552 PURE TONE AUDIOMETRY AIR: CPT | Performed by: AUDIOLOGIST

## 2017-08-01 PROCEDURE — 92567 TYMPANOMETRY: CPT | Performed by: AUDIOLOGIST

## 2017-08-01 PROCEDURE — V5275 EAR IMPRESSION: HCPCS | Mod: RT | Performed by: AUDIOLOGIST

## 2017-08-01 PROCEDURE — 92556 SPEECH AUDIOMETRY COMPLETE: CPT | Performed by: AUDIOLOGIST

## 2017-08-01 NOTE — PROGRESS NOTES
Audiology Evaluation Completed. Hearing aid check completed.   Please refer SCANNED AUDIOGRAM and/or TYMPANOGRAM for BACKGROUND, RESULTS, RECOMMENDATIONS.    Ursula DANIELS, [facility]  Audiologist #[id number]

## 2017-08-01 NOTE — MR AVS SNAPSHOT
"              After Visit Summary   8/1/2017    Elizabeth Rankin    MRN: [id number]           Patient Information     Date Of Birth          1965        Visit Information        Provider Department      8/1/2017 9:15 AM Ursula Mcdowell AuD [facility]        Today's Diagnoses     Sensorineural hearing loss, bilateral    -  1       Follow-ups after your visit        Your next 10 appointments already scheduled     Aug 23, 2017  3:30 PM CDT   (Arrive by 3:15 PM)   Rr Ear Mold Fitting with Garcia Hernandez   [facility] ([facility] [place] )    [address]  [place] [id number] [phone]            Nov 06, 2017   Procedure with Alek Kimbrough MD   [facility] ([facility] )    [address] [place], [place] [address] [place] [address] [phone]              Who to contact     If you have questions or need follow up information about today's clinic visit or your schedule please contact [facility] directly at [phone].  Normal or non-critical lab and imaging results will be communicated to you by MyChart, letter or phone within 4 business days after the clinic has received the results. If you do not hear from us within 7 days, please contact the clinic through MyChart or phone. If you have a critical or abnormal lab result, we will notify you by phone as soon as possible.  Submit refill requests through [employer] or call your pharmacy and they will forward the refill request to us. Please allow 3 business days for your refill to be completed.          Additional Information About Your Visit        MyChart Information     [employer] lets you send messages to your doctor, view your test results, renew your prescriptions, schedule appointments and more. To sign up, go to [email or web address] . Click on \"Log in\" on the left side of the screen, which will take you to the Welcome page. Then click on \"Sign up Now\" on the right side of the page. "     You will be asked to enter the access code listed below, as well as some personal information. Please follow the directions to create your username and password.     Your access code is: [email or web address]  Expires: 2017  2:04 PM     Your access code will  in 90 days. If you need help or a new code, please call your [place] clinic or [phone].        Care EveryWhere ID     This is your Care EveryWhere ID. This could be used by other organizations to access your [place] medical records  [id number]         Blood Pressure from Last 3 Encounters:   10/31/16 130/76   16 148/88   16 122/82    Weight from Last 3 Encounters:   10/31/16 250 lb (113.4 kg)   16 250 lb (113.4 kg)   16 250 lb (113.4 kg)              We Performed the Following     AUDIOGRAM/TYMPANOGRAM - INTERFACE        Primary Care Provider Office Phone # Fax #    Danae Yusuf [phone]       [facility] [address] [facility] [address]        Equal Access to Services     [facility]: Hadii aad ku hadasho Soomaali, waaxda luqadaha, qaybta kaalmada adeegyada, omero arndt hayesthelan rocío gaytan . So [facility] [phone].    ATENCIÓN: Si habla español, tiene a lind disposición servicios gratuitos de asistencia lingüística. Llame al [phone].    We comply with applicable federal civil rights laws and [place] laws. We do not discriminate on the basis of race, color, national origin, age, disability sex, sexual orientation or gender identity.            Thank you!     Thank you for choosing [facility]  for your care. Our goal is always to provide you with excellent care. Hearing back from our patients is one way we can continue to improve our services. Please take a few minutes to complete the written survey that you may receive in the mail after your visit with us. Thank you!             Your Updated Medication List - Protect others around you: Learn how to safely  use, store and throw away your medicines at www.disposemymeds.org.          This list is accurate as of: 8/1/17 10:04 AM.  Always use your most recent med list.                   Brand Name Dispense Instructions for use Diagnosis    adalimumab 40 MG/0.8ML prefilled syringe kit    HUMIRA     Inject 40 mg Subcutaneous every 7 days        fluticasone 50 MCG/ACT spray    FLONASE    1 Bottle    Spray 2 sprays into both nostrils daily    Dysfunction of eustachian tube, bilateral       IRON SUPPLEMENT PO      Take 325 mg by mouth daily (with breakfast)        metFORMIN 1000 MG tablet    GLUCOPHAGE     Take 1 tablet by mouth 2 times daily (with meals).        MULTIVITAMIN PO      Take 1 tablet by mouth daily        VITAMIN D3 PO      Take 5,000 Units by mouth daily

## 2017-08-15 ENCOUNTER — OFFICE VISIT (OUTPATIENT)
Dept: AUDIOLOGY | Facility: OTHER | Age: 52
End: 2017-08-15
Attending: AUDIOLOGIST
Payer: MEDICARE

## 2017-08-15 DIAGNOSIS — H90.3 SENSORINEURAL HEARING LOSS, BILATERAL: Primary | ICD-10-CM

## 2017-08-15 PROCEDURE — V5264 EAR MOLD/INSERT: HCPCS | Mod: LT | Performed by: AUDIOLOGIST

## 2017-08-15 NOTE — MR AVS SNAPSHOT
"              After Visit Summary   8/15/2017    Elizabeth Rankin    MRN: [id number]           Patient Information     Date Of Birth          1965        Visit Information        Provider Department      8/15/2017 10:00 AM Ursula Mcdowell AuD [facility]        Today's Diagnoses     Sensorineural hearing loss, bilateral    -  1       Follow-ups after your visit        Your next 10 appointments already scheduled     Nov 06, 2017   Procedure with Alek Kimbrough MD   [facility] ([facility] )    [address] [place] [address] [phone]              Who to contact     If you have questions or need follow up information about today's clinic visit or your schedule please contact [facility] directly at [phone].  Normal or non-critical lab and imaging results will be communicated to you by MyChart, letter or phone within 4 business days after the clinic has received the results. If you do not hear from us within 7 days, please contact the clinic through MyChart or phone. If you have a critical or abnormal lab result, we will notify you by phone as soon as possible.  Submit refill requests through [employer] or call your pharmacy and they will forward the refill request to us. Please allow 3 business days for your refill to be completed.          Additional Information About Your Visit        [employer] Information     [employer] lets you send messages to your doctor, view your test results, renew your prescriptions, schedule appointments and more. To sign up, go to [email or web address] . Click on \"Log in\" on the left side of the screen, which will take you to the Welcome page. Then click on \"Sign up Now\" on the right side of the page.     You will be asked to enter the access code listed below, as well as some personal information. Please follow the directions to create your username and password.     Your access code is: [email or web address]  Expires: 11/13/2017 " 12:13 PM     Your access code will  in 90 days. If you need help or a new code, please call your [place] clinic or [phone].        Care EveryWhere ID     This is your Care EveryWhere ID. This could be used by other organizations to access your [place] medical records  [id number]         Blood Pressure from Last 3 Encounters:   10/31/16 130/76   16 148/88   16 122/82    Weight from Last 3 Encounters:   10/31/16 250 lb (113.4 kg)   16 250 lb (113.4 kg)   16 250 lb (113.4 kg)              Today, you had the following     No orders found for display       Primary Care Provider Office Phone # Fax #    Danae Yusuf [phone]       [facility] [address] [facility] [address]        Equal Access to Services     [facility] : Hadii aad ku hadasho Soomaali, waaxda luqadaha, qaybta kaalmada adeegyada, waxay joanin hayaan roíco gaytan . So [facility] [phone].    ATENCIÓN: Si habla español, tiene a lind disposición servicios gratuitos de asistencia lingüística. Jeniffer al [phone].    We comply with applicable federal civil rights laws and [place] laws. We do not discriminate on the basis of race, color, national origin, age, disability sex, sexual orientation or gender identity.            Thank you!     Thank you for choosing [facility]  for your care. Our goal is always to provide you with excellent care. Hearing back from our patients is one way we can continue to improve our services. Please take a few minutes to complete the written survey that you may receive in the mail after your visit with us. Thank you!             Your Updated Medication List - Protect others around you: Learn how to safely use, store and throw away your medicines at www.disposemymeds.org.          This list is accurate as of: 8/15/17 12:13 PM.  Always use your most recent med list.                   Brand Name Dispense Instructions for use  Diagnosis    adalimumab 40 MG/0.8ML prefilled syringe kit    HUMIRA     Inject 40 mg Subcutaneous every 7 days        fluticasone 50 MCG/ACT spray    FLONASE    1 Bottle    Spray 2 sprays into both nostrils daily    Dysfunction of eustachian tube, bilateral       IRON SUPPLEMENT PO      Take 325 mg by mouth daily (with breakfast)        metFORMIN 1000 MG tablet    GLUCOPHAGE     Take 1 tablet by mouth 2 times daily (with meals).        MULTIVITAMIN PO      Take 1 tablet by mouth daily        VITAMIN D3 PO      Take 5,000 Units by mouth daily

## 2017-08-15 NOTE — PROGRESS NOTES
Background:  Received custom Binaural Unitron c-shell earmolds with repair only warranty expiration: 9/2/19..  Custom c-shell eamrolds were ordered.      Procedures Performed:  Reprogrammed to user settings + 3dBHL per patient satisfaction.    Follow up:   Return to clinic as needed.        Ursula Mcdowell M.S., CCC-A  [place] Licensed Audiologist  #[id number]

## 2017-08-17 ENCOUNTER — ALLIED HEALTH/NURSE VISIT (OUTPATIENT)
Dept: AUDIOLOGY | Facility: OTHER | Age: 52
End: 2017-08-17
Attending: AUDIOLOGIST
Payer: MEDICARE

## 2017-08-17 DIAGNOSIS — H90.3 SENSORINEURAL HEARING LOSS, BILATERAL: Primary | ICD-10-CM

## 2017-08-17 PROCEDURE — V5266 BATTERY FOR HEARING DEVICE: HCPCS

## 2017-09-06 DIAGNOSIS — M25.552 LEFT HIP PAIN: Primary | ICD-10-CM

## 2017-09-14 ENCOUNTER — HOSPITAL ENCOUNTER (OUTPATIENT)
Dept: INTERVENTIONAL RADIOLOGY/VASCULAR | Facility: HOSPITAL | Age: 52
Discharge: HOME | End: 2017-09-14
Attending: FAMILY MEDICINE | Admitting: FAMILY MEDICINE
Payer: MEDICARE

## 2017-09-14 PROCEDURE — 20610 DRAIN/INJ JOINT/BURSA W/O US: CPT | Mod: TC,LT

## 2017-09-14 PROCEDURE — 77002 NEEDLE LOCALIZATION BY XRAY: CPT | Mod: TC

## 2017-09-14 PROCEDURE — 25000128 H RX IP 250 OP 636: Performed by: RADIOLOGY

## 2017-09-14 PROCEDURE — 25000125 ZZHC RX 250: Performed by: RADIOLOGY

## 2017-09-14 RX ORDER — METHYLPREDNISOLONE ACETATE 80 MG/ML
80 INJECTION, SUSPENSION INTRA-ARTICULAR; INTRALESIONAL; INTRAMUSCULAR; SOFT TISSUE ONCE
Status: COMPLETED | OUTPATIENT
Start: 2017-09-14 | End: 2017-09-14

## 2017-09-14 RX ORDER — LIDOCAINE HYDROCHLORIDE 10 MG/ML
INJECTION, SOLUTION EPIDURAL; INFILTRATION; INTRACAUDAL; PERINEURAL
Status: DISPENSED
Start: 2017-09-14 | End: 2017-09-14

## 2017-09-14 RX ORDER — METHYLPREDNISOLONE ACETATE 80 MG/ML
INJECTION, SUSPENSION INTRA-ARTICULAR; INTRALESIONAL; INTRAMUSCULAR; SOFT TISSUE
Status: DISPENSED
Start: 2017-09-14 | End: 2017-09-14

## 2017-09-14 RX ORDER — IOPAMIDOL 612 MG/ML
50 INJECTION, SOLUTION INTRAVASCULAR ONCE
Status: COMPLETED | OUTPATIENT
Start: 2017-09-14 | End: 2017-09-14

## 2017-09-14 RX ADMIN — METHYLPREDNISOLONE ACETATE 80 MG: 80 INJECTION, SUSPENSION INTRA-ARTICULAR; INTRALESIONAL; INTRAMUSCULAR; SOFT TISSUE at 11:57

## 2017-09-14 RX ADMIN — IOPAMIDOL 2 ML: 612 INJECTION, SOLUTION INTRAVASCULAR at 11:56

## 2017-09-14 RX ADMIN — LIDOCAINE HYDROCHLORIDE 4 ML: 10 INJECTION, SOLUTION EPIDURAL; INFILTRATION; INTRACAUDAL; PERINEURAL at 11:56

## 2017-09-14 NOTE — PROGRESS NOTES
Fluoro time for this case was 4 seconds, less than 5 min  Was patient held? NO  If yes, by whom? NA

## 2017-09-14 NOTE — DISCHARGE INSTRUCTIONS
Home number on file [phone] (home)  Is it ok to leave a message at this number(s)? Yes    Dr. Jimenez completed your LEFT HIP INJECTION procedure on 9/14/2017.    Current Pain Level (0-10 Scale): 3/10  Post Pain Level (0-10):  3/10    Radiology Discharge instructions for Steroid Injection    Activity Level:     Do not do any heavy activity or exercise for 24 hours.   Do not drive for 4 hours after your injection.  Diet:   Return to your normal diet.  Medications:   If you have stopped taking your Aspirin, Coumadin/Warfarin, Ibuprofen, or any   other blood thinner for this procedure you may resume in the morning unless   your primary care provider has given you other instructions.    Diabetics may see an increase in blood sugar after steroid injections. If you are concerned about your blood sugar, please contact your family doctor.    Site Care:  Remove the bandage and bathe or shower the morning after the procedure.      Please allow two weeks to experience improvement in your pain.  If you have any further issues, please contact your provider.    Call your Primary Care Provider if you have the following (if your primary care provider is not available please seek emergency care):   Nausea with vomiting   Severe headache   Drowsiness or confusion   Redness or drainage at the injection or puncture site   Temperature over 101 degrees F   Other concerns   Worsening back pain   Stiff neck

## 2017-09-14 NOTE — IP AVS SNAPSHOT
MRN:[id number]                      After Visit Summary   9/14/2017    Elizabeth Rankin    MRN: [id number]           Visit Information        Provider Department      9/14/2017 11:30 AM Radiologist, Need Interventional; [facility]           Review of your medicines      UNREVIEWED medicines. Ask your doctor about these medicines        Dose / Directions    adalimumab 40 MG/0.8ML prefilled syringe kit   Commonly known as:  HUMIRA        Dose:  40 mg   Inject 40 mg Subcutaneous every 7 days   Refills:  0       fluticasone 50 MCG/ACT spray   Commonly known as:  FLONASE   Used for:  Dysfunction of eustachian tube, bilateral        Dose:  2 spray   Spray 2 sprays into both nostrils daily   Quantity:  1 Bottle   Refills:  11       IRON SUPPLEMENT PO        Dose:  325 mg   Take 325 mg by mouth daily (with breakfast)   Refills:  0       metFORMIN 1000 MG tablet   Commonly known as:  GLUCOPHAGE        Dose:  1 tablet   Take 1 tablet by mouth 2 times daily (with meals).   Refills:  0       MULTIVITAMIN PO        Dose:  1 tablet   Take 1 tablet by mouth daily   Refills:  0       VITAMIN D3 PO        Dose:  5000 Units   Take 5,000 Units by mouth daily   Refills:  0                Protect others around you: Learn how to safely use, store and throw away your medicines at www.disposemymeds.org.         Follow-ups after your visit        Your next 10 appointments already scheduled     Nov 06, 2017   Procedure with Alek Kimbrough MD   [facility] ([address] [id number] [phone]               Care Instructions        Further instructions from your care team       Home number on file [phone] (home)  Is it ok to leave a message at this number(s)? Yes    Dr. Jimenez completed your LEFT HIP INJECTION procedure on 9/14/2017.    Current Pain Level (0-10 Scale): 3/10  Post Pain Level (0-10):  3/10    Radiology  "Discharge instructions for Steroid Injection    Activity Level:     Do not do any heavy activity or exercise for 24 hours.   Do not drive for 4 hours after your injection.  Diet:   Return to your normal diet.  Medications:   If you have stopped taking your Aspirin, Coumadin/Warfarin, Ibuprofen, or any   other blood thinner for this procedure you may resume in the morning unless   your primary care provider has given you other instructions.    Diabetics may see an increase in blood sugar after steroid injections. If you are concerned about your blood sugar, please contact your family doctor.    Site Care:  Remove the bandage and bathe or shower the morning after the procedure.      Please allow two weeks to experience improvement in your pain.  If you have any further issues, please contact your provider.    Call your Primary Care Provider if you have the following (if your primary care provider is not available please seek emergency care):   Nausea with vomiting   Severe headache   Drowsiness or confusion   Redness or drainage at the injection or puncture site   Temperature over 101 degrees F   Other concerns   Worsening back pain   Stiff neck       Additional Information About Your Visit        [employer] Information     [employer] lets you send messages to your doctor, view your test results, renew your prescriptions, schedule appointments and more. To sign up, go to [email or web address] . Click on \"Log in\" on the left side of the screen, which will take you to the Welcome page. Then click on \"Sign up Now\" on the right side of the page.     You will be asked to enter the access code listed below, as well as some personal information. Please follow the directions to create your username and password.     Your access code is: [email or web address]  Expires: 2017 12:13 PM     Your access code will  in 90 days. If you need help or a new code, please call your [place] clinic or [phone].        Care EveryWhere ID  "    This is your Care EveryWhere ID. This could be used by other organizations to access your [place] medical records  [id number]         Primary Care Provider Office Phone # Fax #    Danae Yusuf [phone]      Equal Access to Services     [facility] : Hadii nathanael corral hadcaroleo Sokellali, waaxda luqadaha, qaybta kaalmada adeegyada, omero joanin hayaaregla fourniermarie agarwal la'esthelaregla morillo. So [facility] [phone].    ATENCIÓN: Si habla español, tiene a lind disposición servicios gratuitos de asistencia lingüística. Llame al [phone].    We comply with applicable federal civil rights laws and [place] laws. We do not discriminate on the basis of race, color, national origin, age, disability sex, sexual orientation or gender identity.            Thank you!     Thank you for choosing [facility] for your care. Our goal is always to provide you with excellent care. Hearing back from our patients is one way we can continue to improve our services. Please take a few minutes to complete the written survey that you may receive in the mail after you visit with us. Thank you!             Medication List: This is a list of all your medications and when to take them. Check marks below indicate your daily home schedule. Keep this list as a reference.      Medications           Morning Afternoon Evening Bedtime As Needed    adalimumab 40 MG/0.8ML prefilled syringe kit   Commonly known as:  HUMIRA   Inject 40 mg Subcutaneous every 7 days                                fluticasone 50 MCG/ACT spray   Commonly known as:  FLONASE   Spray 2 sprays into both nostrils daily                                IRON SUPPLEMENT PO   Take 325 mg by mouth daily (with breakfast)                                metFORMIN 1000 MG tablet   Commonly known as:  GLUCOPHAGE   Take 1 tablet by mouth 2 times daily (with meals).                                MULTIVITAMIN PO   Take 1 tablet by mouth daily                                VITAMIN D3 PO    Take 5,000 Units by mouth daily

## 2017-09-14 NOTE — IP AVS SNAPSHOT
[facility]    [address] [place] [address]    Phone:  [phone]    Fax:  [phone]                                       After Visit Summary   9/14/2017    Elizabeth Rankin    MRN: [id number]           After Visit Summary Signature Page     I have received my discharge instructions, and my questions have been answered. I have discussed any challenges I see with this plan with the nurse or doctor.    ..........................................................................................................................................  Patient/Patient Representative Signature      ..........................................................................................................................................  Patient Representative Print Name and Relationship to Patient    ..................................................               ................................................  Date                                            Time    ..........................................................................................................................................  Reviewed by Signature/Title    ...................................................              ..............................................  Date                                                            Time

## 2017-10-30 RX ORDER — UREA 10 %
500 LOTION (ML) TOPICAL DAILY
COMMUNITY
End: 2017-10-30 | Stop reason: HOSPADM

## 2017-11-17 ENCOUNTER — ALLIED HEALTH/NURSE VISIT (OUTPATIENT)
Dept: AUDIOLOGY | Facility: OTHER | Age: 52
End: 2017-11-17
Attending: AUDIOLOGIST
Payer: MEDICARE

## 2017-11-17 DIAGNOSIS — H90.5 SENSORINEURAL HEARING LOSS, UNILATERAL: Primary | ICD-10-CM

## 2017-11-17 PROCEDURE — V5266 BATTERY FOR HEARING DEVICE: HCPCS

## 2018-01-19 ENCOUNTER — TRANSFERRED RECORDS (OUTPATIENT)
Dept: HEALTH INFORMATION MANAGEMENT | Facility: CLINIC | Age: 53
End: 2018-01-19

## 2018-01-19 LAB
ALT SERPL-CCNC: 44 U/L (ref 18–65)
AST SERPL-CCNC: 20 U/L (ref 10–30)
CREAT SERPL-MCNC: 0.61 MG/DL (ref 0.7–1.2)
GLUCOSE SERPL-MCNC: 141 MG/DL (ref 60–99)
HBA1C MFR BLD: 7.5 % (ref 4–6)
POTASSIUM SERPL-SCNC: 4.1 MEQ/L (ref 3.5–5.1)

## 2018-02-14 ENCOUNTER — TRANSFERRED RECORDS (OUTPATIENT)
Dept: HEALTH INFORMATION MANAGEMENT | Facility: CLINIC | Age: 53
End: 2018-02-14

## 2018-02-15 ENCOUNTER — OFFICE VISIT (OUTPATIENT)
Dept: OTOLARYNGOLOGY | Facility: OTHER | Age: 53
End: 2018-02-15
Attending: PHYSICIAN ASSISTANT
Payer: MEDICARE

## 2018-02-15 VITALS
WEIGHT: 250 LBS | BODY MASS INDEX: 42.68 KG/M2 | TEMPERATURE: 98 F | SYSTOLIC BLOOD PRESSURE: 142 MMHG | HEIGHT: 64 IN | HEART RATE: 84 BPM | OXYGEN SATURATION: 98 % | DIASTOLIC BLOOD PRESSURE: 76 MMHG

## 2018-02-15 DIAGNOSIS — H72.92 PERFORATION OF TYMPANIC MEMBRANE, LEFT: Primary | ICD-10-CM

## 2018-02-15 DIAGNOSIS — Z96.22 S/P BILATERAL MYRINGOTOMY WITH TUBE PLACEMENT: ICD-10-CM

## 2018-02-15 DIAGNOSIS — H69.93 DYSFUNCTION OF EUSTACHIAN TUBE, BILATERAL: ICD-10-CM

## 2018-02-15 DIAGNOSIS — H93.11 TINNITUS, RIGHT: ICD-10-CM

## 2018-02-15 DIAGNOSIS — H91.93 DECREASED HEARING OF BOTH EARS: Primary | ICD-10-CM

## 2018-02-15 DIAGNOSIS — H90.3 SENSORINEURAL HEARING LOSS (SNHL) OF BOTH EARS: ICD-10-CM

## 2018-02-15 DIAGNOSIS — Z45.89 TYMPANOSTOMY TUBE CHECK: ICD-10-CM

## 2018-02-15 PROCEDURE — G0463 HOSPITAL OUTPT CLINIC VISIT: HCPCS | Mod: 25

## 2018-02-15 PROCEDURE — 99213 OFFICE O/P EST LOW 20 MIN: CPT | Performed by: PHYSICIAN ASSISTANT

## 2018-02-15 PROCEDURE — G0463 HOSPITAL OUTPT CLINIC VISIT: HCPCS

## 2018-02-15 ASSESSMENT — PAIN SCALES - GENERAL: PAINLEVEL: NO PAIN (0)

## 2018-02-15 NOTE — NURSING NOTE
"Chief Complaint   Patient presents with     Consult     ear cleaning, tube fell out of ear.        Initial /76 (BP Location: Left arm, Patient Position: Chair, Cuff Size: Adult Large)  Pulse 84  Temp 98  F (36.7  C) (Tympanic)  Ht 5' 4\" (1.626 m)  Wt 250 lb (113.4 kg)  SpO2 98%  BMI 42.91 kg/m2 Estimated body mass index is 42.91 kg/(m^2) as calculated from the following:    Height as of this encounter: 5' 4\" (1.626 m).    Weight as of this encounter: 250 lb (113.4 kg).  Medication Reconciliation: complete       Margie Wolfe LPN      "

## 2018-02-15 NOTE — PATIENT INSTRUCTIONS
Tube is out on her left ear.   There is a hole (perforation)- left ear. WIll monitor.   Recheck in 4-6 weeks with audiogram    Right tube is extruding and small perforation around tube.   Monitor. Consider removal. Removing tube would leave a larger hole possibly worsening hearing.   Consider MRI of ears if no improvement.     Thank you for allowing BRIAN Prieto and our ENT team to participate in your care.  If your medications are too expensive, please give the nurse a call.  We can possibly change this medication.  If you have a scheduling or an appointment question please contact [facility] Coordinator at their direct line [phone].   ALL nursing questions or concerns can be directed to your ENT nurse at: [phone] Nicole

## 2018-02-15 NOTE — LETTER
2/15/2018         RE: Elizabeth Rankin  [address]   [facility] [address]        Dear Colleague,    Thank you for referring your patient, Elizabeth Rankin, to the [facility] Please see a copy of my visit note below.    Chief Complaint   Patient presents with     Consult     ear cleaning, tube fell out of ear.    Fern presents with ear concerns. She has a hx of BTTI with Dr. Ruelas on 9/30/16. She was seen S/p BTT by MARICHUY NP on 10/31/16 with normal postop exam.   She has not been seen since her initial postop. She was at her PCP's office yesterday, and noted left TM perforation. She has noticed a slight decrease in her hearing (left) for about 1 month. Patient did have pulsatile tinnitus in her right ear as well.   She denies active otorrhea, but does feel left ear has moisture at times.   Bilateral hearing aids with good success.   Denies otalgia.   Audiogram from 8/2017 reviewed with patient.   No flux HL or vertigo    Past Medical History:   Diagnosis Date     Bicuspid aortic valve      Bicuspid aortic valve      Depression      DM type 2 (diabetes mellitus, type 2) (H)      Hyperlipidemia      Nondependent alcohol abuse, unspecified drinking b 1/2/2001        Allergies   Allergen Reactions     Erythromycin Hives     Able to tolerate Zithromax:  Allergy     Meloxicam Swelling     Swelling of lower extremeties; chest pain     Oxycodone      Suppressed respirations     Percocet [Oxycodone-Acetaminophen] Itching     Ritalin [Methylphenidate] Other (See Comments)     Over stimulation     Ritalin [Methylphenidate]      Tramadol      sedation     Tylenol With Codeine [Acetaminophen-Codeine] Nausea     Vistaril [Hydroxyzine]      Dizzy, ankle swelling       Vortioxetine Nausea     Headache, neck pain     Strattera [Atomoxetine] Anxiety     Valium [Diazepam] Rash     Current Outpatient Prescriptions   Medication     Celecoxib (CELEBREX PO)     adalimumab (HUMIRA) 40 MG/0.8ML prefilled syringe kit      "metFORMIN (GLUCOPHAGE) 1000 MG tablet     fluticasone (FLONASE) 50 MCG/ACT nasal spray     Cholecalciferol (VITAMIN D3 PO)     Ferrous Sulfate (IRON SUPPLEMENT PO)     Multiple Vitamins-Minerals (MULTIVITAMIN PO)     No current facility-administered medications for this visit.       ROS: 10 point ROS neg other than the symptoms noted above in the HPI.  /76 (BP Location: Left arm, Patient Position: Chair, Cuff Size: Adult Large)  Pulse 84  Temp 98  F (36.7  C) (Tympanic)  Ht 5' 4\" (1.626 m)  Wt 250 lb (113.4 kg)  SpO2 98%  BMI 42.91 kg/m2  General - The patient is well nourished and well developed, and appears to have good nutritional status.  Alert and oriented to person and place, interactive.  Head and Face - Normocephalic and atraumatic, with no gross asymmetry noted of the contour of the facial features.  The facial nerve is intact, with strong symmetric movements.  Neck-no palpable lymphadenopathy or thyroid mass.  Trachea is midline.  Eyes - Extraocular movements intact.   Ears- External auditory canals are patent, tympanic membranes- Left canal clear. Left TM appears with perforation, 20% Anterior inferior quadrant. Middle ear appears healthy.   Right TM appears with extruding tube, inferior to tube peritubal perforation.Tube appears in superior quadrant   Nose - Nasal mucosa is pink and moist with no abnormal mucus.  The septum was grossly midline aside from left septal spur with contact  turbinates enlarged with right jasen  No polyps, masses, or purulence noted on examination.   Mouth - Examination of the oral cavity shows pink, healthy, moist mucosa.  No lesions or ulceration noted.  The dentition are in good repair.  The tongue is mobile and midline.  Throat - The walls of the oropharynx were smooth, pink, moist, symmetric, and had no lesions or ulcerations.  The tonsillar pillars and soft palate were symmetric.  The uvula was midline on elevation.         ASSESSMENT:    ICD-10-CM    1. " Perforation of tympanic membrane, left H72.92    2. Tympanostomy tube check Z45.89     Right extruding w/ Peritubal perforation   3. S/p bilateral myringotomy with tube placement Z96.22    4. Sensorineural hearing loss (SNHL) of both ears H90.3    5. Dysfunction of Eustachian tube, bilateral H69.83    6. Tinnitus, right H93.11          Left tube is absent with Residual perforation, left. Will monitor at this juncture. Keep ears dry.   Hopefully TM will heal independently. Briefly reviewed surgical options, patient declines.   Will complete audiogram at f/u. May require aid adjustments until perforation resolves.     Right Tube is starting to uplift/ extrude. I did not remove tube at today's visit due to inferior peritubal perforation. I discussed with patient possible worsening of hearing with removal (due to larger TM perforation). Will allow tube to extrude independently.   Reviewed pulsatile tinnitus. Patient declined imaging at this time.     She agrees with this plan.   Keep ears dry    Perforations often heal on there own if given 6 months of observation.  Dry ear precautions are necessary during this time period.  If it does not heal, a tympanoplasty is usually recommended.  This, when successful, may improve hearing, diminish contamination from water exposure and therefore infection and also decrease the likelihood of cholesteatoma.  The risks and benefits of a tympanoplasty were described  along with necessary pre-operative precautions.   A brochure was shared with the patient today regarding the perforated eardrum.                Radhika Barron PA-C  ENT  [facility]  [phone]                                                        Again, thank you for allowing me to participate in the care of your patient.        Sincerely,        Radhika Barron PA-C

## 2018-02-15 NOTE — PROGRESS NOTES
Chief Complaint   Patient presents with     Consult     ear cleaning, tube fell out of ear.    Fern presents with ear concerns. She has a hx of BTTI with Dr. Ruelas on 9/30/16. She was seen S/p BTT by MARICHUY NP on 10/31/16 with normal postop exam.   She has not been seen since her initial postop. She was at her PCP's office yesterday, and noted left TM perforation. She has noticed a slight decrease in her hearing (left) for about 1 month. Patient did have pulsatile tinnitus in her right ear as well.   She denies active otorrhea, but does feel left ear has moisture at times.   Bilateral hearing aids with good success.   Denies otalgia.   Audiogram from 8/2017 reviewed with patient.   No flux HL or vertigo    Past Medical History:   Diagnosis Date     Bicuspid aortic valve      Bicuspid aortic valve      Depression      DM type 2 (diabetes mellitus, type 2) (H)      Hyperlipidemia      Nondependent alcohol abuse, unspecified drinking b 1/2/2001        Allergies   Allergen Reactions     Erythromycin Hives     Able to tolerate Zithromax:  Allergy     Meloxicam Swelling     Swelling of lower extremeties; chest pain     Oxycodone      Suppressed respirations     Percocet [Oxycodone-Acetaminophen] Itching     Ritalin [Methylphenidate] Other (See Comments)     Over stimulation     Ritalin [Methylphenidate]      Tramadol      sedation     Tylenol With Codeine [Acetaminophen-Codeine] Nausea     Vistaril [Hydroxyzine]      Dizzy, ankle swelling       Vortioxetine Nausea     Headache, neck pain     Strattera [Atomoxetine] Anxiety     Valium [Diazepam] Rash     Current Outpatient Prescriptions   Medication     Celecoxib (CELEBREX PO)     adalimumab (HUMIRA) 40 MG/0.8ML prefilled syringe kit     metFORMIN (GLUCOPHAGE) 1000 MG tablet     fluticasone (FLONASE) 50 MCG/ACT nasal spray     Cholecalciferol (VITAMIN D3 PO)     Ferrous Sulfate (IRON SUPPLEMENT PO)     Multiple Vitamins-Minerals (MULTIVITAMIN PO)     No current  "facility-administered medications for this visit.       ROS: 10 point ROS neg other than the symptoms noted above in the HPI.  /76 (BP Location: Left arm, Patient Position: Chair, Cuff Size: Adult Large)  Pulse 84  Temp 98  F (36.7  C) (Tympanic)  Ht 5' 4\" (1.626 m)  Wt 250 lb (113.4 kg)  SpO2 98%  BMI 42.91 kg/m2  General - The patient is well nourished and well developed, and appears to have good nutritional status.  Alert and oriented to person and place, interactive.  Head and Face - Normocephalic and atraumatic, with no gross asymmetry noted of the contour of the facial features.  The facial nerve is intact, with strong symmetric movements.  Neck-no palpable lymphadenopathy or thyroid mass.  Trachea is midline.  Eyes - Extraocular movements intact.   Ears- External auditory canals are patent, tympanic membranes- Left canal clear. Left TM appears with perforation, 20% Anterior inferior quadrant. Middle ear appears healthy.   Right TM appears with extruding tube, inferior to tube peritubal perforation.Tube appears in superior quadrant   Nose - Nasal mucosa is pink and moist with no abnormal mucus.  The septum was grossly midline aside from left septal spur with contact  turbinates enlarged with right jasen  No polyps, masses, or purulence noted on examination.   Mouth - Examination of the oral cavity shows pink, healthy, moist mucosa.  No lesions or ulceration noted.  The dentition are in good repair.  The tongue is mobile and midline.  Throat - The walls of the oropharynx were smooth, pink, moist, symmetric, and had no lesions or ulcerations.  The tonsillar pillars and soft palate were symmetric.  The uvula was midline on elevation.         ASSESSMENT:    ICD-10-CM    1. Perforation of tympanic membrane, left H72.92    2. Tympanostomy tube check Z45.89     Right extruding w/ Peritubal perforation   3. S/p bilateral myringotomy with tube placement Z96.22    4. Sensorineural hearing loss (SNHL) of " both ears H90.3    5. Dysfunction of Eustachian tube, bilateral H69.83    6. Tinnitus, right H93.11          Left tube is absent with Residual perforation, left. Will monitor at this juncture. Keep ears dry.   Hopefully TM will heal independently. Briefly reviewed surgical options, patient declines.   Will complete audiogram at f/u. May require aid adjustments until perforation resolves.     Right Tube is starting to uplift/ extrude. I did not remove tube at today's visit due to inferior peritubal perforation. I discussed with patient possible worsening of hearing with removal (due to larger TM perforation). Will allow tube to extrude independently.   Reviewed pulsatile tinnitus. Patient declined imaging at this time.     She agrees with this plan.   Keep ears dry    Perforations often heal on there own if given 6 months of observation.  Dry ear precautions are necessary during this time period.  If it does not heal, a tympanoplasty is usually recommended.  This, when successful, may improve hearing, diminish contamination from water exposure and therefore infection and also decrease the likelihood of cholesteatoma.  The risks and benefits of a tympanoplasty were described  along with necessary pre-operative precautions.   A brochure was shared with the patient today regarding the perforated eardrum.                Radhika Barron PA-C  ENT  [facility], [place]  [phone]

## 2018-02-22 ENCOUNTER — ALLIED HEALTH/NURSE VISIT (OUTPATIENT)
Dept: AUDIOLOGY | Facility: OTHER | Age: 53
End: 2018-02-22
Attending: AUDIOLOGIST
Payer: MEDICARE

## 2018-02-22 DIAGNOSIS — H90.3 SENSORINEURAL HEARING LOSS, BILATERAL: Primary | ICD-10-CM

## 2018-02-22 PROCEDURE — V5266 BATTERY FOR HEARING DEVICE: HCPCS

## 2018-03-22 ENCOUNTER — OFFICE VISIT (OUTPATIENT)
Dept: OTOLARYNGOLOGY | Facility: OTHER | Age: 53
End: 2018-03-22
Attending: PHYSICIAN ASSISTANT
Payer: MEDICARE

## 2018-03-22 ENCOUNTER — OFFICE VISIT (OUTPATIENT)
Dept: AUDIOLOGY | Facility: OTHER | Age: 53
End: 2018-03-22
Attending: AUDIOLOGIST
Payer: MEDICARE

## 2018-03-22 VITALS
SYSTOLIC BLOOD PRESSURE: 140 MMHG | HEART RATE: 84 BPM | BODY MASS INDEX: 42.68 KG/M2 | OXYGEN SATURATION: 97 % | HEIGHT: 64 IN | TEMPERATURE: 98.5 F | DIASTOLIC BLOOD PRESSURE: 80 MMHG | WEIGHT: 250 LBS

## 2018-03-22 DIAGNOSIS — H69.93 DYSFUNCTION OF EUSTACHIAN TUBE, BILATERAL: ICD-10-CM

## 2018-03-22 DIAGNOSIS — T85.618D NON-FUNCTIONING TYMPANOSTOMY TUBE, SUBSEQUENT ENCOUNTER: ICD-10-CM

## 2018-03-22 DIAGNOSIS — H90.8 MIXED HEARING LOSS, UNILATERAL: ICD-10-CM

## 2018-03-22 DIAGNOSIS — H90.A32 MIXED CONDUCTIVE AND SENSORINEURAL HEARING LOSS OF LEFT EAR WITH RESTRICTED HEARING OF RIGHT EAR: ICD-10-CM

## 2018-03-22 DIAGNOSIS — H69.92 DYSFUNCTION OF LEFT EUSTACHIAN TUBE: ICD-10-CM

## 2018-03-22 DIAGNOSIS — H90.3 SENSORINEURAL HEARING LOSS, BILATERAL: ICD-10-CM

## 2018-03-22 DIAGNOSIS — H72.92 PERFORATION OF TYMPANIC MEMBRANE, LEFT: Primary | ICD-10-CM

## 2018-03-22 DIAGNOSIS — H90.3 SENSORINEURAL HEARING LOSS (SNHL) OF BOTH EARS: ICD-10-CM

## 2018-03-22 PROCEDURE — G0463 HOSPITAL OUTPT CLINIC VISIT: HCPCS

## 2018-03-22 PROCEDURE — 92557 COMPREHENSIVE HEARING TEST: CPT | Performed by: AUDIOLOGIST

## 2018-03-22 PROCEDURE — 92567 TYMPANOMETRY: CPT | Performed by: AUDIOLOGIST

## 2018-03-22 PROCEDURE — 92593 ZZHC HEARING AID CHECK, BINAURAL: CPT | Performed by: AUDIOLOGIST

## 2018-03-22 PROCEDURE — 99213 OFFICE O/P EST LOW 20 MIN: CPT | Performed by: PHYSICIAN ASSISTANT

## 2018-03-22 ASSESSMENT — PAIN SCALES - GENERAL: PAINLEVEL: NO PAIN (0)

## 2018-03-22 NOTE — PROGRESS NOTES
Chief Complaint   Patient presents with     RECHECK     f/u left TM perf, Right extruding tube, left tube gone    Fern presents with ear concerns. She has a hx of BTTI with Dr. Ruelas on 9/30/16. She was seen S/p BTT by MARICHUY, NP on 10/31/16 with normal postop exam.   She was last seen by myself on 2/15/18; right tube was extruding and left TM perforation. Since, she has noted pulsatile tinnitus resolved. Her hearing remains decreased on left. Right ear mild fullness intermittently.     She denies active otorrhea, but does feel left ear has moisture at times.   Bilateral hearing aids with good success.   Denies otalgia.   Audiogram from 8/2017 reviewed with patient.   No flux HL or vertigo  Reviewed audiogram with patient today. Right ear stable, TYpe A tympanogram. Left ear with mixed loss, typ B large volume, c/w/ perforation.       Past Medical History:   Diagnosis Date     Bicuspid aortic valve      Bicuspid aortic valve      Depression      DM type 2 (diabetes mellitus, type 2) (H)      Hyperlipidemia      Nondependent alcohol abuse, unspecified drinking b 1/2/2001        Allergies   Allergen Reactions     Erythromycin Hives     Able to tolerate Zithromax:  Allergy     Meloxicam Swelling     Swelling of lower extremeties; chest pain     Oxycodone      Suppressed respirations     Percocet [Oxycodone-Acetaminophen] Itching     Ritalin [Methylphenidate] Other (See Comments)     Over stimulation     Ritalin [Methylphenidate]      Tramadol      sedation     Tylenol With Codeine [Acetaminophen-Codeine] Nausea     Vistaril [Hydroxyzine]      Dizzy, ankle swelling       Vortioxetine Nausea     Headache, neck pain     Strattera [Atomoxetine] Anxiety     Valium [Diazepam] Rash     Current Outpatient Prescriptions   Medication     SITagliptin Phosphate (JANUVIA PO)     Celecoxib (CELEBREX PO)     adalimumab (HUMIRA) 40 MG/0.8ML prefilled syringe kit     metFORMIN (GLUCOPHAGE) 1000 MG tablet     fluticasone (FLONASE)  "50 MCG/ACT nasal spray     Cholecalciferol (VITAMIN D3 PO)     Ferrous Sulfate (IRON SUPPLEMENT PO)     Multiple Vitamins-Minerals (MULTIVITAMIN PO)     No current facility-administered medications for this visit.       ROS: 10 point ROS neg other than the symptoms noted above in the HPI.  /80 (BP Location: Right arm, Patient Position: Chair, Cuff Size: Adult Large)  Pulse 84  Temp 98.5  F (36.9  C) (Tympanic)  Ht 5' 4\" (1.626 m)  Wt 250 lb (113.4 kg)  SpO2 97%  BMI 42.91 kg/m2  General - The patient is well nourished and well developed, and appears to have good nutritional status.  Alert and oriented to person and place, interactive.  Head and Face - Normocephalic and atraumatic, with no gross asymmetry noted of the contour of the facial features.  The facial nerve is intact, with strong symmetric movements.  Neck-no palpable lymphadenopathy or thyroid mass.  Trachea is midline.  Eyes - Extraocular movements intact.   Ears- External auditory canals are patent, tympanic membranes- Left canal clear. Left TM appears with perforation, 20-25% Anterior inferior quadrant. Middle ear appears healthy.   Right TM appears with extruded tube on superior quadrant. Tube removed with cupped forceps. TM intact without effusion or retraction.   Nose - Nasal mucosa is pink and moist with no abnormal mucus.  The septum was grossly midline aside from left septal spur with contact  turbinates enlarged with right jasen  No polyps, masses, or purulence noted on examination.   Mouth - Examination of the oral cavity shows pink, healthy, moist mucosa.  No lesions or ulceration noted.  The dentition are in good repair.  The tongue is mobile and midline.  Throat - The walls of the oropharynx were smooth, pink, moist, symmetric, and had no lesions or ulcerations.  The tonsillar pillars and soft palate were symmetric.  The uvula was midline on elevation.      ASSESSMENT:    ICD-10-CM    1. Perforation of tympanic membrane, left " H72.92    2. Non-functioning tympanostomy tube, subsequent encounter T85.768D     removed right tube   3. Dysfunction of Eustachian tube, bilateral H69.83    4. Sensorineural hearing loss (SNHL) of both ears H90.3    5. 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 Right ear looks well, no peroration/ effusion. Could consider tube in future however, I would expect no changes with hearing. Etc.   She does have perforation left, appears stable since her last visit. Reviewed options of watchful waiting vs. Tube placement/ myringoplasty in office. Declined procedure. At this time, will see if perforation heals independently.   Keep left ear dry        Radhika Barron PA-C  ENT  [facility], [place]  [phone]

## 2018-03-22 NOTE — PROGRESS NOTES
Audiology Evaluation Completed.     Hearing aid check completed.    Please refer SCANNED AUDIOGRAM and/or TYMPANOGRAM for BACKGROUND, RESULTS, RECOMMENDATIONS.    Ursula DANIELS, [facility]  Audiologist #[id number]

## 2018-03-22 NOTE — PATIENT INSTRUCTIONS
Tubes are out.   Right ear healed. No perforation  Stable hearing right.     Left perforation remains. Keep ear dry  Will allow perforation to heal. May consider tube placement/ patch.  Follow up in 2-3 months    Thank you for allowing BRIAN Prieto and our ENT team to participate in your care.  If your medications are too expensive, please give the nurse a call.  We can possibly change this medication.  If you have a scheduling or an appointment question please contact [facility] Unit Coordinator at their direct line [phone].   ALL nursing questions or concerns can be directed to your ENT nurse at: [phone] Nicole

## 2018-03-22 NOTE — MR AVS SNAPSHOT
After Visit Summary   3/22/2018    Elizabeth Rankin    MRN: [id number]           Patient Information     Date Of Birth          1965        Visit Information        Provider Department      3/22/2018 11:15 AM [facility]ron [facility]-C [facility]        Care Instructions    Tubes are out.   Right ear healed. No perforation  Stable hearing right.     Left perforation remains. Keep ear dry  Will allow perforation to heal. May consider tube placement/ patch.  Follow up in 2-3 months    Thank you for allowing BRIAN Prieto and our ENT team to participate in your care.  If your medications are too expensive, please give the nurse a call.  We can possibly change this medication.  If you have a scheduling or an appointment question please contact [facility] Unit Coordinator at their direct line [phone].   ALL nursing questions or concerns can be directed to your ENT nurse at: [phone] [facility]               Follow-ups after your visit        Who to contact     If you have questions or need follow up information about today's clinic visit or your schedule please contact [facility] directly at [phone].  Normal or non-critical lab and imaging results will be communicated to you by [employer], letter or phone within 4 business days after the clinic has received the results. If you do not hear from us within 7 days, please contact the clinic through [employer] or phone. If you have a critical or abnormal lab result, we will notify you by phone as soon as possible.  Submit refill requests through [email or web address] or call your pharmacy and they will forward the refill request to us. Please allow 3 business days for your refill to be completed.          Additional Information About Your Visit        MyChart Information     [email or web address] lets you send messages to your doctor, view your test results, renew your prescriptions, schedule appointments and more. To sign up, go to [email or web address] .  "Click on \"Log in\" on the left side of the screen, which will take you to the Welcome page. Then click on \"Sign up Now\" on the right side of the page.     You will be asked to enter the access code listed below, as well as some personal information. Please follow the directions to create your username and password.     Your access code is: [email or web address]  Expires: 2018  4:03 PM     Your access code will  in 90 days. If you need help or a new code, please call your [place] clinic or [phone].        Care EveryWhere ID     This is your Care EveryWhere ID. This could be used by other organizations to access your [place] medical records  [id number]        Your Vitals Were     Pulse Temperature Height Pulse Oximetry BMI (Body Mass Index)       84 98.5  F (36.9  C) (Tympanic) 5' 4\" (1.626 m) 97% 42.91 kg/m2        Blood Pressure from Last 3 Encounters:   18 140/80   02/15/18 142/76   10/31/16 130/76    Weight from Last 3 Encounters:   18 250 lb (113.4 kg)   02/15/18 250 lb (113.4 kg)   10/31/16 250 lb (113.4 kg)              Today, you had the following     No orders found for display       Primary Care Provider Office Phone # Fax #    Danae Yusuf [phone]       [facility] [address] [facility] [address]        Equal Access to Services     [facility] AH: Hadii aad ku hadasho Soomaali, waaxda luqadaha, qaybta kaalmada adeegyada, omero gaytan . So [facility] [phone].    ATENCIÓN: Si habla español, tiene a lind disposición servicios gratuitos de asistencia lingüística. Lldian al [phone].    We comply with applicable federal civil rights laws and [place] laws. We do not discriminate on the basis of race, color, national origin, age, disability, sex, sexual orientation, or gender identity.            Thank you!     Thank you for choosing [facility]  for your care. Our goal is always to provide you with excellent " care. Hearing back from our patients is one way we can continue to improve our services. Please take a few minutes to complete the written survey that you may receive in the mail after your visit with us. Thank you!             Your Updated Medication List - Protect others around you: Learn how to safely use, store and throw away your medicines at www.disposemymeds.org.          This list is accurate as of 3/22/18 11:31 AM.  Always use your most recent med list.                   Brand Name Dispense Instructions for use Diagnosis    adalimumab 40 MG/0.8ML prefilled syringe kit    HUMIRA     Inject 40 mg Subcutaneous every 7 days        CELEBREX PO           fluticasone 50 MCG/ACT spray    FLONASE    1 Bottle    Spray 2 sprays into both nostrils daily    Dysfunction of Eustachian tube, bilateral       IRON SUPPLEMENT PO      Take 325 mg by mouth daily (with breakfast)        JANUVIA PO      Take 25 mg by mouth 2 times daily        metFORMIN 1000 MG tablet    GLUCOPHAGE     Take 1 tablet by mouth 2 times daily (with meals).        MULTIVITAMIN PO      Take 1 tablet by mouth daily        VITAMIN D3 PO      Take 5,000 Units by mouth daily

## 2018-03-22 NOTE — MR AVS SNAPSHOT
"              After Visit Summary   3/22/2018    Elizabeth Rankin    MRN: [id number]           Patient Information     Date Of Birth          1965        Visit Information        Provider Department      3/22/2018 10:45 AM Ursula Mcdowell AuD [facility] [place]        Today's Diagnoses     Sensorineural hearing loss, bilateral        Mixed hearing loss, unilateral        Dysfunction of left eustachian tube           Follow-ups after your visit        Your next 10 appointments already scheduled     Mar 22, 2018 11:15 AM CDT   (Arrive by 11:00 AM)   Return Visit with Radhika Barron PA-C   [facility] [place] ([facility] )    [address]  [place] [address]   [phone]              Who to contact     If you have questions or need follow up information about today's clinic visit or your schedule please contact [facility] directly at [phone].  Normal or non-critical lab and imaging results will be communicated to you by MyChart, letter or phone within 4 business days after the clinic has received the results. If you do not hear from us within 7 days, please contact the clinic through MyChart or phone. If you have a critical or abnormal lab result, we will notify you by phone as soon as possible.  Submit refill requests through [employer] or call your pharmacy and they will forward the refill request to us. Please allow 3 business days for your refill to be completed.          Additional Information About Your Visit        MyChart Information     [employer] lets you send messages to your doctor, view your test results, renew your prescriptions, schedule appointments and more. To sign up, go to [email or web address] . Click on \"Log in\" on the left side of the screen, which will take you to the Welcome page. Then click on \"Sign up Now\" on the right side of the page.     You will be asked to enter the access code listed below, as well as some personal information. " Please follow the directions to create your username and password.     Your access code is: [email or web address]  Expires: 2018  4:03 PM     Your access code will  in 90 days. If you need help or a new code, please call your [place] clinic or [phone].        Care EveryWhere ID     This is your Care EveryWhere ID. This could be used by other organizations to access your [place] medical records  [id number]         Blood Pressure from Last 3 Encounters:   02/15/18 142/76   10/31/16 130/76   16 148/88    Weight from Last 3 Encounters:   02/15/18 250 lb (113.4 kg)   10/31/16 250 lb (113.4 kg)   16 250 lb (113.4 kg)              We Performed the Following     AUDIOGRAM/TYMPANOGRAM - INTERFACE        Primary Care Provider Office Phone # Fax #    Danae Yusuf [phone]       [facility] [address] [facility] [address]        Equal Access to Services     [facility]: Hadii aad ku hadasho Soomaali, waaxda luqadaha, qaybta kaalmada adeegyada, waxay joanin haybenton gaytan . So [facility] [phone].    ATENCIÓN: Si habla español, tiene a lind disposición servicios gratuitos de asistencia lingüística. Llame al [phone].    We comply with applicable federal civil rights laws and [place] laws. We do not discriminate on the basis of race, color, national origin, age, disability, sex, sexual orientation, or gender identity.            Thank you!     Thank you for choosing [facility]  for your care. Our goal is always to provide you with excellent care. Hearing back from our patients is one way we can continue to improve our services. Please take a few minutes to complete the written survey that you may receive in the mail after your visit with us. Thank you!             Your Updated Medication List - Protect others around you: Learn how to safely use, store and throw away your medicines at www.disposemymeds.org.          This list is  accurate as of 3/22/18 11:04 AM.  Always use your most recent med list.                   Brand Name Dispense Instructions for use Diagnosis    adalimumab 40 MG/0.8ML prefilled syringe kit    HUMIRA     Inject 40 mg Subcutaneous every 7 days        CELEBREX PO           fluticasone 50 MCG/ACT spray    FLONASE    1 Bottle    Spray 2 sprays into both nostrils daily    Dysfunction of Eustachian tube, bilateral       IRON SUPPLEMENT PO      Take 325 mg by mouth daily (with breakfast)        JANUVIA PO      Take 25 mg by mouth 2 times daily        metFORMIN 1000 MG tablet    GLUCOPHAGE     Take 1 tablet by mouth 2 times daily (with meals).        MULTIVITAMIN PO      Take 1 tablet by mouth daily        VITAMIN D3 PO      Take 5,000 Units by mouth daily

## 2018-03-22 NOTE — LETTER
3/22/2018         RE: Elizabeth Rankin  [address]   [place] [address]        Dear Colleague,    Thank you for referring your patient, Elizabeth Rankin, to the [facility]. Please see a copy of my visit note below.    Chief Complaint   Patient presents with     RECHECK     f/u left TM perf, Right extruding tube, left tube gone    Fern presents with ear concerns. She has a hx of BTTI with Dr. Ruelas on 9/30/16. She was seen S/p BTT by MARICHUY, NP on 10/31/16 with normal postop exam.   She was last seen by myself on 2/15/18; right tube was extruding and left TM perforation. Since, she has noted pulsatile tinnitus resolved. Her hearing remains decreased on left. Right ear mild fullness intermittently.     She denies active otorrhea, but does feel left ear has moisture at times.   Bilateral hearing aids with good success.   Denies otalgia.   Audiogram from 8/2017 reviewed with patient.   No flux HL or vertigo  Reviewed audiogram with patient today. Right ear stable, TYpe A tympanogram. Left ear with mixed loss, typ B large volume, c/w/ perforation.       Past Medical History:   Diagnosis Date     Bicuspid aortic valve      Bicuspid aortic valve      Depression      DM type 2 (diabetes mellitus, type 2) (H)      Hyperlipidemia      Nondependent alcohol abuse, unspecified drinking b 1/2/2001        Allergies   Allergen Reactions     Erythromycin Hives     Able to tolerate Zithromax:  Allergy     Meloxicam Swelling     Swelling of lower extremeties; chest pain     Oxycodone      Suppressed respirations     Percocet [Oxycodone-Acetaminophen] Itching     Ritalin [Methylphenidate] Other (See Comments)     Over stimulation     Ritalin [Methylphenidate]      Tramadol      sedation     Tylenol With Codeine [Acetaminophen-Codeine] Nausea     Vistaril [Hydroxyzine]      Dizzy, ankle swelling       Vortioxetine Nausea     Headache, neck pain     Strattera [Atomoxetine] Anxiety     Valium [Diazepam] Rash  "    Current Outpatient Prescriptions   Medication     SITagliptin Phosphate (JANUVIA PO)     Celecoxib (CELEBREX PO)     adalimumab (HUMIRA) 40 MG/0.8ML prefilled syringe kit     metFORMIN (GLUCOPHAGE) 1000 MG tablet     fluticasone (FLONASE) 50 MCG/ACT nasal spray     Cholecalciferol (VITAMIN D3 PO)     Ferrous Sulfate (IRON SUPPLEMENT PO)     Multiple Vitamins-Minerals (MULTIVITAMIN PO)     No current facility-administered medications for this visit.       ROS: 10 point ROS neg other than the symptoms noted above in the HPI.  /80 (BP Location: Right arm, Patient Position: Chair, Cuff Size: Adult Large)  Pulse 84  Temp 98.5  F (36.9  C) (Tympanic)  Ht 5' 4\" (1.626 m)  Wt 250 lb (113.4 kg)  SpO2 97%  BMI 42.91 kg/m2  General - The patient is well nourished and well developed, and appears to have good nutritional status.  Alert and oriented to person and place, interactive.  Head and Face - Normocephalic and atraumatic, with no gross asymmetry noted of the contour of the facial features.  The facial nerve is intact, with strong symmetric movements.  Neck-no palpable lymphadenopathy or thyroid mass.  Trachea is midline.  Eyes - Extraocular movements intact.   Ears- External auditory canals are patent, tympanic membranes- Left canal clear. Left TM appears with perforation, 20-25% Anterior inferior quadrant. Middle ear appears healthy.   Right TM appears with extruded tube on superior quadrant. Tube removed with cupped forceps. TM intact without effusion or retraction.   Nose - Nasal mucosa is pink and moist with no abnormal mucus.  The septum was grossly midline aside from left septal spur with contact  turbinates enlarged with right jasen  No polyps, masses, or purulence noted on examination.   Mouth - Examination of the oral cavity shows pink, healthy, moist mucosa.  No lesions or ulceration noted.  The dentition are in good repair.  The tongue is mobile and midline.  Throat - The walls of the oropharynx " were smooth, pink, moist, symmetric, and had no lesions or ulcerations.  The tonsillar pillars and soft palate were symmetric.  The uvula was midline on elevation.      ASSESSMENT:    ICD-10-CM    1. Perforation of tympanic membrane, left H72.92    2. Non-functioning tympanostomy tube, subsequent encounter T85.618D     removed right tube   3. Dysfunction of Eustachian tube, bilateral H69.83    4. Sensorineural hearing loss (SNHL) of both ears H90.3    5. 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 Right ear looks well, no peroration/ effusion. Could consider tube in future however, I would expect no changes with hearing. Etc.   She does have perforation left, appears stable since her last visit. Reviewed options of watchful waiting vs. Tube placement/ myringoplasty in office. Declined procedure. At this time, will see if perforation heals independently.   Keep left ear dry        Radhika Barron PA-C  ENT  [facility] [place] [facility]  [phone]      Again, thank you for allowing me to participate in the care of your patient.        Sincerely,        Radhika Barron PA-C

## 2018-03-22 NOTE — NURSING NOTE
"Chief Complaint   Patient presents with     RECHECK     f/u left TM perf, Right extruding tube, left tube gone        Initial /80 (BP Location: Right arm, Patient Position: Chair, Cuff Size: Adult Large)  Pulse 84  Temp 98.5  F (36.9  C) (Tympanic)  Ht 5' 4\" (1.626 m)  Wt 250 lb (113.4 kg)  SpO2 97%  BMI 42.91 kg/m2 Estimated body mass index is 42.91 kg/(m^2) as calculated from the following:    Height as of this encounter: 5' 4\" (1.626 m).    Weight as of this encounter: 250 lb (113.4 kg).  Medication Reconciliation: complete       Margie Wolfe LPN      "

## 2018-04-06 ENCOUNTER — TELEPHONE (OUTPATIENT)
Dept: EDUCATION SERVICES | Facility: HOSPITAL | Age: 53
End: 2018-04-06

## 2018-04-06 DIAGNOSIS — E11.9 DM TYPE 2 (DIABETES MELLITUS, TYPE 2) (H): Primary | ICD-10-CM

## 2018-04-09 ENCOUNTER — HOSPITAL ENCOUNTER (OUTPATIENT)
Facility: HOSPITAL | Age: 53
End: 2018-04-09
Attending: INTERNAL MEDICINE | Admitting: INTERNAL MEDICINE
Payer: MEDICARE

## 2018-04-09 ENCOUNTER — HOSPITAL ENCOUNTER (OUTPATIENT)
Dept: EDUCATION SERVICES | Facility: HOSPITAL | Age: 53
Discharge: HOME | End: 2018-04-09
Attending: NURSE PRACTITIONER | Admitting: FAMILY MEDICINE
Payer: MEDICARE

## 2018-04-09 VITALS
HEART RATE: 99 BPM | HEIGHT: 64 IN | DIASTOLIC BLOOD PRESSURE: 84 MMHG | BODY MASS INDEX: 43.65 KG/M2 | SYSTOLIC BLOOD PRESSURE: 146 MMHG | RESPIRATION RATE: 20 BRPM | OXYGEN SATURATION: 97 % | WEIGHT: 255.7 LBS

## 2018-04-09 DIAGNOSIS — E11.65 TYPE 2 DIABETES MELLITUS WITH HYPERGLYCEMIA, WITHOUT LONG-TERM CURRENT USE OF INSULIN (H): Primary | ICD-10-CM

## 2018-04-09 PROCEDURE — G0108 DIAB MANAGE TRN  PER INDIV: HCPCS

## 2018-04-09 ASSESSMENT — PAIN SCALES - GENERAL: PAINLEVEL: MILD PAIN (3)

## 2018-04-09 NOTE — NURSING NOTE
"Chief Complaint   Patient presents with     Diabetes Education     Some medications may be causing side effects.        Initial /84  Pulse 99  Resp 20  Ht 1.626 m (5' 4\")  Wt 116 kg (255 lb 11.2 oz)  SpO2 97%  BMI 43.89 kg/m2 Estimated body mass index is 43.89 kg/(m^2) as calculated from the following:    Height as of this encounter: 1.626 m (5' 4\").    Weight as of this encounter: 116 kg (255 lb 11.2 oz).  Medication Reconciliation: complete   Arianna Clement      "

## 2018-04-09 NOTE — PROGRESS NOTES
"Elizabeth is here for diabetes review. /84  Pulse 99  Resp 20  Ht 1.626 m (5' 4\")  Wt 116 kg (255 lb 11.2 oz)  SpO2 97%  BMI 43.89 kg/m2 Elizabeth tells me today that she is bipolar and is dealing with the depression today so she is frustrated, angry, etc. Elizabeth is able to stay in our discussion pretty well, but frequently apologizes as she is cranky and doesn't mean to be difficult.    She is taking Metformin 1000mg bid. She had recently been prescribed Januvia, but she was unable to take it due to joint pain. She is unable to take Amaryl and Actos.    Elizabeth did not bring her meter today, but reports fasting BG 150s with today 195. Most recent A1C is 7.6% - 1/19/18. Elizabeth states that she knows that her BG and A1C are elevated, but wants to discuss diabetes medication options. She would prefer that insulin be her last option.    Elizabeth tells me that she is just unable to lose weight even when she is carefully counting carbs, keeping food logs and testing BG. She is very frustrated and she is told that she needs to lose weight for her diabetes and for her ankylosing spondylitis.    Elizabeth tells me that she knows what food has carbs, how to count carbs, portions. She discusses that she struggles with meal planning, shopping and cooking food and reports having such a fatigue at the end of day after work that she is too tired to cook. 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    I discussed the potential options of Farxiga or Trulicity, benefits, actions and side effects. She is open to trying these options. I did discuss benefits of insulin. Discussed with Elizabeth that I need to get an ok from Dr. Yusuf for trying these medications. And then we would have to make sure that they are covered by her insurance.    I will contact Dr. Yusuf about trialing either Farxiga or Trulicity and get back to Elizabeth.    Time spent with Elizabeth 60 minutes.  "

## 2018-04-17 ENCOUNTER — TELEPHONE (OUTPATIENT)
Dept: EDUCATION SERVICES | Facility: HOSPITAL | Age: 53
End: 2018-04-17

## 2018-04-17 DIAGNOSIS — E11.65 TYPE 2 DIABETES MELLITUS WITH HYPERGLYCEMIA, WITHOUT LONG-TERM CURRENT USE OF INSULIN (H): Primary | ICD-10-CM

## 2018-04-17 NOTE — TELEPHONE ENCOUNTER
Patient is wondering when Brenda is going to order her medication? Annalee will be at this number today [phone]

## 2018-04-20 ENCOUNTER — TELEPHONE (OUTPATIENT)
Dept: EDUCATION SERVICES | Facility: OTHER | Age: 53
End: 2018-04-20

## 2018-04-20 NOTE — TELEPHONE ENCOUNTER
Called Annalee. Discussed with her that I received a ok back from Dr. Yusuf that Annalee could try either Farxiga or Trulicity. Reviewed actions and side effects of both and Annalee preferred to try the Trulicity. Will send prescription to determine insurance coverage for Trulicity. Annalee has an appointment with me next week to start the Trulicity.

## 2018-04-20 NOTE — TELEPHONE ENCOUNTER
Patient states she was seen in clinic 2 weeks ago and is waiting for an update.  Patient is requesting a call back from Brenda Galicia RN.

## 2018-04-26 ENCOUNTER — HOSPITAL ENCOUNTER (OUTPATIENT)
Dept: EDUCATION SERVICES | Facility: HOSPITAL | Age: 53
Discharge: HOME | End: 2018-04-26
Attending: NURSE PRACTITIONER | Admitting: FAMILY MEDICINE
Payer: MEDICARE

## 2018-04-26 VITALS
WEIGHT: 255.8 LBS | BODY MASS INDEX: 43.67 KG/M2 | DIASTOLIC BLOOD PRESSURE: 89 MMHG | HEIGHT: 64 IN | SYSTOLIC BLOOD PRESSURE: 156 MMHG | HEART RATE: 82 BPM | OXYGEN SATURATION: 98 %

## 2018-04-26 DIAGNOSIS — E11.65 TYPE 2 DIABETES MELLITUS WITH HYPERGLYCEMIA, WITHOUT LONG-TERM CURRENT USE OF INSULIN (H): Primary | ICD-10-CM

## 2018-04-26 PROCEDURE — G0108 DIAB MANAGE TRN  PER INDIV: HCPCS

## 2018-04-26 ASSESSMENT — PAIN SCALES - GENERAL: PAINLEVEL: MODERATE PAIN (5)

## 2018-04-26 NOTE — NURSING NOTE
"Chief Complaint   Patient presents with     Diabetes     med start       Initial /89  Pulse 82  Ht 1.626 m (5' 4\")  Wt 116 kg (255 lb 12.8 oz)  SpO2 98%  BMI 43.91 kg/m2 Estimated body mass index is 43.91 kg/(m^2) as calculated from the following:    Height as of this encounter: 1.626 m (5' 4\").    Weight as of this encounter: 116 kg (255 lb 12.8 oz).  Medication Reconciliation: complete   Justina Vega      "

## 2018-04-27 NOTE — PROGRESS NOTES
"Elizabeth is here to start Trulicity. /89  Pulse 82  Ht 1.626 m (5' 4\")  Wt 116 kg (255 lb 12.8 oz)  SpO2 98%  BMI 43.91 kg/m2  She has already picked up her prescription. Elizabeth is taking Metformin 1000 mg bid. She did not bring her meter today.    Instructed Elizabeth on the storage of Trulicity, action, side effects, when to call with concerns, to inject one dose per week and that she has received 4 pens - will use one pen per week, how to use the pen, how to dispose of the pen. Elizabeth plans to take the Trulicity on Tuesdays - starting next Tuesday.    Elizabeth was able to demonstrate how to use the Trulicity pen using a demo pen. Needed some minimal cueing.    Instructed her to call with any questions or concerns. Will follow by phone next week to see how she is doing.    Elizabeth had fallen on the steps of a local [employer] prior to coming to her appointment. C/O discomfort right lower back/hip and she skinned her knee through her jeans. Quarter sized red abrasion noted on right knee. Requested Tylenol but I explained to her that I don't carry that in our department. I encouraged her to go to Urgent Care but she declined.     Time spent with patient 30 minutes.  "
significant other/partner may visit

## 2018-05-04 ENCOUNTER — TELEPHONE (OUTPATIENT)
Dept: EDUCATION SERVICES | Facility: HOSPITAL | Age: 53
End: 2018-05-04

## 2018-05-04 NOTE — TELEPHONE ENCOUNTER
Called Annalee as she had started Trulicity on Tuesday, 5/1/18. States she is having some dizziness since starting - worse in the AM. Wants to keep trying it as BG readings have come down. Will follow prn.

## 2018-05-16 ENCOUNTER — TELEPHONE (OUTPATIENT)
Dept: EDUCATION SERVICES | Facility: HOSPITAL | Age: 53
End: 2018-05-16

## 2018-05-16 NOTE — TELEPHONE ENCOUNTER
Called Annalee. Left message as I will be gone until Monday. My message states that she can call us back and talk with one of the other educators in my absence with concerns.

## 2018-05-16 NOTE — TELEPHONE ENCOUNTER
Wondering about the medication she is taking and the side effects. She wants to speak to Brenda Galicia.

## 2018-05-22 ENCOUNTER — ALLIED HEALTH/NURSE VISIT (OUTPATIENT)
Dept: AUDIOLOGY | Facility: OTHER | Age: 53
End: 2018-05-22
Attending: AUDIOLOGIST
Payer: MEDICARE

## 2018-05-22 DIAGNOSIS — H90.3 SENSORINEURAL HEARING LOSS, BILATERAL: Primary | ICD-10-CM

## 2018-05-22 LAB — HBA1C MFR BLD: 6.7 % (ref 0–5.7)

## 2018-05-22 PROCEDURE — V5266 BATTERY FOR HEARING DEVICE: HCPCS

## 2018-05-22 NOTE — TELEPHONE ENCOUNTER
Called Elizaebth back. She had been concerned about some confusion since starting the Trulicity, but she spoke with her pharmacist who said that is was most likely not caused by the Trulicity. She tells me that she has also had dizziness (when changes head positions)and fatigue which have slowly decreased over time of taking the Trulicity. Elizabeth also has been having some afternoon heartburn. She took her 4th dose today. Elizabeth tells me that she doesn't think her BG readings are much different.    Had a recent lab draw which included A1C.     Will follow prn.

## 2018-05-24 ENCOUNTER — TELEPHONE (OUTPATIENT)
Dept: EDUCATION SERVICES | Facility: HOSPITAL | Age: 53
End: 2018-05-24

## 2018-05-24 NOTE — TELEPHONE ENCOUNTER
Received fax from Dr. Ernandez-Nedra office that states to hold Trulicity for 3 weeks. I called Elizabeth to discuss this. She is aware. Will follow by phone in 3-4 weeks.

## 2018-06-04 ENCOUNTER — TELEPHONE (OUTPATIENT)
Dept: EDUCATION SERVICES | Facility: HOSPITAL | Age: 53
End: 2018-06-04

## 2018-06-04 NOTE — TELEPHONE ENCOUNTER
Would like Brenda [place]icia to give her a call when she is back in the office on Thursday to talk about her meds.

## 2018-06-08 NOTE — TELEPHONE ENCOUNTER
Called Annalee. She feels so much better after stopping Trulicity. However, FBG readings are 170-200. Annalee wants to get BG in control. We discussed restarting actos or insulin. She is open to trying a basal insulin after we discussed her objections to insulin.    I will fax a communication to dr. Danae Yusuf regarding trying a basal insulin.

## 2018-06-11 ENCOUNTER — TELEPHONE (OUTPATIENT)
Dept: EDUCATION SERVICES | Facility: HOSPITAL | Age: 53
End: 2018-06-11

## 2018-06-12 ENCOUNTER — HOSPITAL ENCOUNTER (OUTPATIENT)
Dept: EDUCATION SERVICES | Facility: HOSPITAL | Age: 53
Discharge: HOME | End: 2018-06-12
Attending: NURSE PRACTITIONER | Admitting: FAMILY MEDICINE
Payer: MEDICARE

## 2018-06-12 ENCOUNTER — OFFICE VISIT (OUTPATIENT)
Dept: OTOLARYNGOLOGY | Facility: OTHER | Age: 53
End: 2018-06-12
Attending: PHYSICIAN ASSISTANT
Payer: MEDICARE

## 2018-06-12 VITALS
SYSTOLIC BLOOD PRESSURE: 130 MMHG | BODY MASS INDEX: 43.4 KG/M2 | OXYGEN SATURATION: 97 % | WEIGHT: 254.2 LBS | DIASTOLIC BLOOD PRESSURE: 80 MMHG | HEART RATE: 78 BPM | HEIGHT: 64 IN

## 2018-06-12 VITALS
TEMPERATURE: 97.7 F | WEIGHT: 250 LBS | BODY MASS INDEX: 42.68 KG/M2 | HEIGHT: 64 IN | OXYGEN SATURATION: 97 % | DIASTOLIC BLOOD PRESSURE: 84 MMHG | SYSTOLIC BLOOD PRESSURE: 132 MMHG | HEART RATE: 87 BPM

## 2018-06-12 DIAGNOSIS — Z98.890 HISTORY OF MYRINGOTOMY: ICD-10-CM

## 2018-06-12 DIAGNOSIS — H90.3 SENSORINEURAL HEARING LOSS (SNHL) OF BOTH EARS: ICD-10-CM

## 2018-06-12 DIAGNOSIS — H69.93 DYSFUNCTION OF EUSTACHIAN TUBE, BILATERAL: ICD-10-CM

## 2018-06-12 DIAGNOSIS — E11.65 TYPE 2 DIABETES MELLITUS WITH HYPERGLYCEMIA, WITH LONG-TERM CURRENT USE OF INSULIN (H): Primary | ICD-10-CM

## 2018-06-12 DIAGNOSIS — H90.A32 MIXED CONDUCTIVE AND SENSORINEURAL HEARING LOSS OF LEFT EAR WITH RESTRICTED HEARING OF RIGHT EAR: ICD-10-CM

## 2018-06-12 DIAGNOSIS — H72.92 PERFORATION OF TYMPANIC MEMBRANE, LEFT: Primary | ICD-10-CM

## 2018-06-12 DIAGNOSIS — Z79.4 TYPE 2 DIABETES MELLITUS WITH HYPERGLYCEMIA, WITH LONG-TERM CURRENT USE OF INSULIN (H): Primary | ICD-10-CM

## 2018-06-12 PROCEDURE — G0108 DIAB MANAGE TRN  PER INDIV: HCPCS

## 2018-06-12 PROCEDURE — G0463 HOSPITAL OUTPT CLINIC VISIT: HCPCS

## 2018-06-12 PROCEDURE — 99213 OFFICE O/P EST LOW 20 MIN: CPT | Performed by: PHYSICIAN ASSISTANT

## 2018-06-12 RX ORDER — INSULIN GLARGINE 100 [IU]/ML
INJECTION, SOLUTION SUBCUTANEOUS
Qty: 15 ML | Refills: 3 | Status: SHIPPED | OUTPATIENT
Start: 2018-06-12 | End: 2018-07-20

## 2018-06-12 ASSESSMENT — PAIN SCALES - GENERAL
PAINLEVEL: NO PAIN (0)
PAINLEVEL: NO PAIN (0)

## 2018-06-12 NOTE — MR AVS SNAPSHOT
After Visit Summary   6/12/2018    Elizabeth Rankin    MRN: [id number]           Patient Information     Date Of Birth          1965        Visit Information        Provider Department      6/12/2018 9:45 AM Radhika Barron PA-C [place] [facility] [place]        Care Instructions    Continue with water precautions.   Monitor ear- recheck in 6 months.   Perforation remains present  Right ear looks good. No fluid/ infection.   Thank you for allowing BRIAN Prieto and our ENT team to participate in your care.  If your medications are too expensive, please give the nurse a call.  We can possibly change this medication.  If you have a scheduling or an appointment question please contact [facility] Unit Coordinator at their direct line [phone].   ALL nursing questions or concerns can be directed to your ENT nurse at: [phone] [place]             Follow-ups after your visit        Your next 10 appointments already scheduled     Jun 12, 2018  9:45 AM CDT   (Arrive by 9:30 AM)   Return Visit with Radhika Barron PA-C   [facility] ([facility] )    [address] [place] [address] [phone]            Jun 12, 2018 10:30 AM CDT   (Arrive by 10:15 AM)   Return Visit with Brenda Galicia RN   [facility] ([facility] )    [address] [place] [id number] [phone]            Jul 27, 2018   Procedure with Jonathon Collins MD   [facility] ([facility] )    [address] [place] [id number] [phone]              Who to contact     If you have questions or need follow up information about today's clinic visit or your schedule please contact [facility] directly at [phone].  Normal or non-critical lab and imaging results will be communicated to you by MyChart, letter or phone within 4 business days after the clinic has received the results. If you do not hear from us within 7 days, please  "contact the clinic through [employer] or phone. If you have a critical or abnormal lab result, we will notify you by phone as soon as possible.  Submit refill requests through [employer] or call your pharmacy and they will forward the refill request to us. Please allow 3 business days for your refill to be completed.          Additional Information About Your Visit        [facility] Information     [employer] lets you send messages to your doctor, view your test results, renew your prescriptions, schedule appointments and more. To sign up, go to [email or web address] . Click on \"Log in\" on the left side of the screen, which will take you to the Welcome page. Then click on \"Sign up Now\" on the right side of the page.     You will be asked to enter the access code listed below, as well as some personal information. Please follow the directions to create your username and password.     Your access code is: [email or web address]  Expires: 2018  2:00 PM     Your access code will  in 90 days. If you need help or a new code, please call your [place] clinic or [phone].        Care EveryWhere ID     This is your Care EveryWhere ID. This could be used by other organizations to access your [place] medical records  [id number]        Your Vitals Were     Pulse Temperature Height Pulse Oximetry BMI (Body Mass Index)       87 97.7  F (36.5  C) (Tympanic) 5' 4\" (1.626 m) 97% 42.91 kg/m2        Blood Pressure from Last 3 Encounters:   18 132/84   18 156/89   18 146/84    Weight from Last 3 Encounters:   18 250 lb (113.4 kg)   18 255 lb 12.8 oz (116 kg)   18 255 lb 11.2 oz (116 kg)              Today, you had the following     No orders found for display       Primary Care Provider Office Phone # Fax #    Danae Yusuf [phone]       [facility] [address] [facility] [address]        Equal Access to Services     TIGRE ESCOBAR AH: Monica garcía " Kathrin, kadeem sandovaljodieha, la kanyasia fox, omero joanin hayaan irlandamarie judsoncamila lashivaregla ilia. So [facility] [phone].    ATENCIÓN: Si serenityla ramon, tiene a lind disposición servicios gratuitos de asistencia lingüística. Jeniffer al [phone].    We comply with applicable federal civil rights laws and [place] laws. We do not discriminate on the basis of race, color, national origin, age, disability, sex, sexual orientation, or gender identity.            Thank you!     Thank you for choosing [facility]  for your care. Our goal is always to provide you with excellent care. Hearing back from our patients is one way we can continue to improve our services. Please take a few minutes to complete the written survey that you may receive in the mail after your visit with us. Thank you!             Your Updated Medication List - Protect others around you: Learn how to safely use, store and throw away your medicines at www.disposemymeds.org.          This list is accurate as of 6/12/18  9:35 AM.  Always use your most recent med list.                   Brand Name Dispense Instructions for use Diagnosis    adalimumab 40 MG/0.8ML prefilled syringe kit    HUMIRA     Inject 40 mg Subcutaneous every 7 days        CELEBREX PO           metFORMIN 1000 MG tablet    GLUCOPHAGE     Take 1 tablet by mouth 2 times daily (with meals).

## 2018-06-12 NOTE — PATIENT INSTRUCTIONS
Continue with water precautions.   Monitor ear- recheck in 6 months.   Perforation remains present  Right ear looks good. No fluid/ infection.   Thank you for allowing BRIAN Prieto and our ENT team to participate in your care.  If your medications are too expensive, please give the nurse a call.  We can possibly change this medication.  If you have a scheduling or an appointment question please contact [facility] Unit Coordinator at their direct line [phone].   ALL nursing questions or concerns can be directed to your ENT nurse at: [phone] [place]

## 2018-06-12 NOTE — IP AVS SNAPSHOT
MRN:[id number]                      After Visit Summary   6/12/2018    Elizabeth Rankin    MRN: [id number]           Thank you!     Thank you for choosing [facility] for your care. Our goal is always to provide you with excellent care. Hearing back from our patients is one way we can continue to improve our services. Please take a few minutes to complete the written survey that you may receive in the mail after you visit with us. Thank you!        Patient Information     Date Of Birth          1965        About your hospital stay     You were admitted on:  June 12, 2018 You last received care in the:  [facility]    You were discharged on:  June 12, 2018       Who to Call     For medical emergencies, please call 911.  For non-urgent questions about your medical care, please call your primary care provider or clinic, [phone]          Attending Provider     Provider Specialty    Sofiya Woods NP [facility]       Primary Care Provider Office Phone # Fax #    Danae Yusuf [phone]      Your next 10 appointments already scheduled     Jul 27, 2018   Procedure with Jonathon Collins MD   [facility] ([facility] )    [address] [place] [id number] [phone]            Dec 12, 2018  3:45 PM CST   (Arrive by 3:30 PM)   Return Visit with Radhika Barron PA-C   [facility] )    [address] [facility] [facility] [address]   [phone]              Further instructions from your care team       Start Basaglar 10 units daily    Test BG twice a day: every AM before breakfast or coffee and 2 hours after supper. (I will order enough for 3 times daily)    I will you on 6/15 for BG readings    Please if you lows/concerns: [phone]    Pending Results     No orders found from 6/10/2018 to 6/13/2018.            Admission Information     Date & Time Provider Department Dept. Phone    6/12/2018 Peggy  "Sofiya ROE NP [facility] [phone]      Your Vitals Were     Blood Pressure Pulse Height Weight Pulse Oximetry BMI (Body Mass Index)    130/80 78 1.626 m (5' 4\") 115.3 kg (254 lb 3.2 oz) 97% 43.63 kg/m2      [employer] Information     [employer] lets you send messages to your doctor, view your test results, renew your prescriptions, schedule appointments and more. To sign up, go to [email or web address] . Click on \"Log in\" on the left side of the screen, which will take you to the Welcome page. Then click on \"Sign up Now\" on the right side of the page.     You will be asked to enter the access code listed below, as well as some personal information. Please follow the directions to create your username and password.     Your access code is: [email or web address]  Expires: 2018  2:00 PM     Your access code will  in 90 days. If you need help or a new code, please call your [facility] clinic or [phone].        Care EveryWhere ID     This is your Care EveryWhere ID. This could be used by other organizations to access your [facility] medical records  [id number]        Equal Access to Services     TIGRE ESCOBAR AH: Hadii nathanael adameso Sokellali, waaxda luqadaha, qaybta kaalmada adeegyada, omero morillo. So [facility] [phone].    ATENCIÓN: Si habla español, tiene a lind disposición servicios gratuitos de asistencia lingüística. Llame al [phone].    We comply with applicable federal civil rights laws and [place] laws. We do not discriminate on the basis of race, color, national origin, age, disability, sex, sexual orientation, or gender identity.               Review of your medicines      UNREVIEWED medicines. Ask your doctor about these medicines        Dose / Directions    adalimumab 40 MG/0.8ML prefilled syringe kit   Commonly known as:  HUMIRA        Dose:  40 mg   Inject 40 mg Subcutaneous every 7 days   Refills:  0       CELEBREX PO        Refills:  0       metFORMIN 1000 MG " tablet   Commonly known as:  GLUCOPHAGE        Dose:  1 tablet   Take 1 tablet by mouth 2 times daily (with meals).   Refills:  0                Protect others around you: Learn how to safely use, store and throw away your medicines at www.disposemymeds.org.             Medication List: This is a list of all your medications and when to take them. Check marks below indicate your daily home schedule. Keep this list as a reference.      Medications           Morning Afternoon Evening Bedtime As Needed    adalimumab 40 MG/0.8ML prefilled syringe kit   Commonly known as:  HUMIRA   Inject 40 mg Subcutaneous every 7 days                                CELEBREX PO                                metFORMIN 1000 MG tablet   Commonly known as:  GLUCOPHAGE   Take 1 tablet by mouth 2 times daily (with meals).

## 2018-06-12 NOTE — DISCHARGE INSTRUCTIONS
Start Basaglar 10 units daily    Test BG twice a day: every AM before breakfast or coffee and 2 hours after supper. (I will order enough for 3 times daily)    I will you on 6/15 for BG readings    Please if you lows/concerns: [phone]

## 2018-06-12 NOTE — LETTER
6/12/2018         RE: Elizabeth Rankin  [address]  [facility] [address]        Dear Colleague,    Thank you for referring your patient, Elizabeth Rankin, to the [facility]. Please see a copy of my visit note below.    Chief Complaint   Patient presents with     RECHECK     follow up for left TM perf, right tube removal      Patient returns for recheck of Left TM perforations. SHe was last seen on 3/22/18. At that time, she was doing well and reported Right ear was well without tube. She had residual perforation, but declined further procedures. She has been keeping her ear dry at this time.   Fern has noted that her ears remain the same.  She     She has a hx of BTTI with Dr. Ruelas on 9/30/16. She was seen S/p BTT by MARICHUY, NP on 10/31/16 with normal postop exam.   She was last seen by myself on 2/15/18; right tube was extruding and left TM perforation. Since, she has noted pulsatile tinnitus resolved. Her hearing remains decreased on left. Right ear mild fullness intermittently.      She denies active otorrhea, but does feel left ear has moisture at times.   Bilateral hearing aids with good success.   Denies otalgia.   Audiogram from 8/2017 reviewed with patient.   No flux HL or vertigo  Reviewed audiogram with patient today. Right ear stable, TYpe A tympanogram. Left ear with mixed loss, typ B large volume, c/w perforation.        Past Medical History:   Diagnosis Date     Bicuspid aortic valve      Bicuspid aortic valve      Depression      DM type 2 (diabetes mellitus, type 2) (H)      Hyperlipidemia      Nondependent alcohol abuse, unspecified drinking b 1/2/2001        Allergies   Allergen Reactions     Erythromycin Hives     Able to tolerate Zithromax:  Allergy     Januvia [Sitagliptin]      Joint pain, nausea     Meloxicam Swelling     Swelling of lower extremeties; chest pain     Oxycodone      Suppressed respirations     Percocet [Oxycodone-Acetaminophen] Itching     Ritalin  "[Methylphenidate] Other (See Comments)     Over stimulation     Ritalin [Methylphenidate]      Tramadol      sedation     Tylenol With Codeine [Acetaminophen-Codeine] Nausea     Vistaril [Hydroxyzine]      Dizzy, ankle swelling       Vortioxetine Nausea     Headache, neck pain     Welchol [Colesevelam]      Joint pain, muscle weakness     Strattera [Atomoxetine] Anxiety     Valium [Diazepam] Rash     Current Outpatient Prescriptions   Medication     adalimumab (HUMIRA) 40 MG/0.8ML prefilled syringe kit     Celecoxib (CELEBREX PO)     dulaglutide (TRULICITY) 0.75 MG/0.5ML pen     metFORMIN (GLUCOPHAGE) 1000 MG tablet     No current facility-administered medications for this visit.       ROS: 10 point ROS neg other than the symptoms noted above in the HPI.  /84 (BP Location: Left arm, Cuff Size: Adult Large)  Pulse 87  Temp 97.7  F (36.5  C) (Tympanic)  Ht 5' 4\" (1.626 m)  Wt 250 lb (113.4 kg)  SpO2 97%  BMI 42.91 kg/m2    General - The patient is well nourished and well developed, and appears to have good nutritional status.  Alert and oriented to person and place, interactive.  Head and Face - Normocephalic and atraumatic, with no gross asymmetry noted of the contour of the facial features.  The facial nerve is intact, with strong symmetric movements.  Neck-no palpable lymphadenopathy or thyroid mass.  Trachea is midline.  Eyes - Extraocular movements intact.   Ears- External auditory canals are patent, tympanic membranes- Left canal clear. Left TM appears with perforation, 20% Anterior inferior quadrant. Middle ear appears healthy.   Right TM intact without effusion or retraction.   Nose - Nasal mucosa is pink and moist with no abnormal mucus.  The septum was grossly midline aside from left septal spur with contact  turbinates enlarged with right jasen  No polyps, masses, or purulence noted on examination.   Mouth - Examination of the oral cavity shows pink, healthy, moist mucosa.  No lesions or " ulceration noted.  The dentition are in good repair.  The tongue is mobile and midline.  Throat - The walls of the oropharynx were smooth, pink, moist, symmetric, and had no lesions or ulcerations.  The tonsillar pillars and soft palate were symmetric.  The uvula was midline on elevation.      ASSESSMENT:    ICD-10-CM    1. Perforation of tympanic membrane, left H72.92    2. Dysfunction of Eustachian tube, bilateral H69.83    3. Sensorineural hearing loss (SNHL) of both ears H90.3    4. 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 History of myringotomy Z98.890      Discussed options with patient, will monitor at this time. Right ear looks well, no peroration/ effusion. Could consider tube in future however, I would expect no changes with hearing. Etc. She agrees with this plan. She did not notice much change w/ tube.    She does have perforation left, appears stable since her last visit. Reviewed options of watchful waiting vs. Possible tympanoplasty.  Declined procedure. At this time, will see if perforation heals independently.   Keep left ear dry  Monitor left ear.   RTC in 6 months. Return sooner, if there are concerns with otorrhea, otalgia, hearing changes.     Radhika Barron PA-C  ENT  [facility]  [phone]      Again, thank you for allowing me to participate in the care of your patient.        Sincerely,        Radhika Barron PA-C

## 2018-06-12 NOTE — PROGRESS NOTES
Chief Complaint   Patient presents with     RECHECK     follow up for left TM perf, right tube removal      Patient returns for recheck of Left TM perforations. SHe was last seen on 3/22/18. At that time, she was doing well and reported Right ear was well without tube. She had residual perforation, but declined further procedures. She has been keeping her ear dry at this time.   Fern has noted that her ears remain the same.  She     She has a hx of BTTI with Dr. Ruelas on 9/30/16. She was seen S/p BTT by MARICHUY, NP on 10/31/16 with normal postop exam.   She was last seen by myself on 2/15/18; right tube was extruding and left TM perforation. Since, she has noted pulsatile tinnitus resolved. Her hearing remains decreased on left. Right ear mild fullness intermittently.      She denies active otorrhea, but does feel left ear has moisture at times.   Bilateral hearing aids with good success.   Denies otalgia.   Audiogram from 8/2017 reviewed with patient.   No flux HL or vertigo  Reviewed audiogram with patient today. Right ear stable, TYpe A tympanogram. Left ear with mixed loss, typ B large volume, c/w perforation.        Past Medical History:   Diagnosis Date     Bicuspid aortic valve      Bicuspid aortic valve      Depression      DM type 2 (diabetes mellitus, type 2) (H)      Hyperlipidemia      Nondependent alcohol abuse, unspecified drinking b 1/2/2001        Allergies   Allergen Reactions     Erythromycin Hives     Able to tolerate Zithromax:  Allergy     Januvia [Sitagliptin]      Joint pain, nausea     Meloxicam Swelling     Swelling of lower extremeties; chest pain     Oxycodone      Suppressed respirations     Percocet [Oxycodone-Acetaminophen] Itching     Ritalin [Methylphenidate] Other (See Comments)     Over stimulation     Ritalin [Methylphenidate]      Tramadol      sedation     Tylenol With Codeine [Acetaminophen-Codeine] Nausea     Vistaril [Hydroxyzine]      Dizzy, ankle swelling        "Vortioxetine Nausea     Headache, neck pain     Welchol [Colesevelam]      Joint pain, muscle weakness     Strattera [Atomoxetine] Anxiety     Valium [Diazepam] Rash     Current Outpatient Prescriptions   Medication     adalimumab (HUMIRA) 40 MG/0.8ML prefilled syringe kit     Celecoxib (CELEBREX PO)     dulaglutide (TRULICITY) 0.75 MG/0.5ML pen     metFORMIN (GLUCOPHAGE) 1000 MG tablet     No current facility-administered medications for this visit.       ROS: 10 point ROS neg other than the symptoms noted above in the HPI.  /84 (BP Location: Left arm, Cuff Size: Adult Large)  Pulse 87  Temp 97.7  F (36.5  C) (Tympanic)  Ht 5' 4\" (1.626 m)  Wt 250 lb (113.4 kg)  SpO2 97%  BMI 42.91 kg/m2    General - The patient is well nourished and well developed, and appears to have good nutritional status.  Alert and oriented to person and place, interactive.  Head and Face - Normocephalic and atraumatic, with no gross asymmetry noted of the contour of the facial features.  The facial nerve is intact, with strong symmetric movements.  Neck-no palpable lymphadenopathy or thyroid mass.  Trachea is midline.  Eyes - Extraocular movements intact.   Ears- External auditory canals are patent, tympanic membranes- Left canal clear. Left TM appears with perforation, 20% Anterior inferior quadrant. Middle ear appears healthy.   Right TM intact without effusion or retraction.   Nose - Nasal mucosa is pink and moist with no abnormal mucus.  The septum was grossly midline aside from left septal spur with contact  turbinates enlarged with right jasen  No polyps, masses, or purulence noted on examination.   Mouth - Examination of the oral cavity shows pink, healthy, moist mucosa.  No lesions or ulceration noted.  The dentition are in good repair.  The tongue is mobile and midline.  Throat - The walls of the oropharynx were smooth, pink, moist, symmetric, and had no lesions or ulcerations.  The tonsillar pillars and soft palate " were symmetric.  The uvula was midline on elevation.      ASSESSMENT:    ICD-10-CM    1. Perforation of tympanic membrane, left H72.92    2. Dysfunction of Eustachian tube, bilateral H69.83    3. Sensorineural hearing loss (SNHL) of both ears H90.3    4. 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 History of myringotomy Z98.890      Discussed options with patient, will monitor at this time. Right ear looks well, no peroration/ effusion. Could consider tube in future however, I would expect no changes with hearing. Etc. She agrees with this plan. She did not notice much change w/ tube.    She does have perforation left, appears stable since her last visit. Reviewed options of watchful waiting vs. Possible tympanoplasty.  Declined procedure. At this time, will see if perforation heals independently.   Keep left ear dry  Monitor left ear.   RTC in 6 months. Return sooner, if there are concerns with otorrhea, otalgia, hearing changes.     Radhika Barron PA-C  ENT  [facility] [place]  [phone]

## 2018-06-12 NOTE — NURSING NOTE
"Chief Complaint   Patient presents with     Diabetes     Return       Initial /80  Pulse 78  Ht 1.626 m (5' 4\")  Wt 115.3 kg (254 lb 3.2 oz)  SpO2 97%  BMI 43.63 kg/m2 Estimated body mass index is 43.63 kg/(m^2) as calculated from the following:    Height as of this encounter: 1.626 m (5' 4\").    Weight as of this encounter: 115.3 kg (254 lb 3.2 oz).  Medication Reconciliation: catherine Montalvo MA    "

## 2018-06-12 NOTE — NURSING NOTE
"Chief Complaint   Patient presents with     RECHECK     follow up for left TM perf, right tube removal        Initial /84 (BP Location: Left arm, Cuff Size: Adult Large)  Pulse 87  Temp 97.7  F (36.5  C) (Tympanic)  Ht 5' 4\" (1.626 m)  Wt 250 lb (113.4 kg)  SpO2 97%  BMI 42.91 kg/m2 Estimated body mass index is 42.91 kg/(m^2) as calculated from the following:    Height as of this encounter: 5' 4\" (1.626 m).    Weight as of this encounter: 250 lb (113.4 kg).  Medication Reconciliation: complete    Macy Mccormick LPN    "

## 2018-06-14 ENCOUNTER — TELEPHONE (OUTPATIENT)
Dept: EDUCATION SERVICES | Facility: HOSPITAL | Age: 53
End: 2018-06-14

## 2018-06-14 DIAGNOSIS — Z79.4 TYPE 2 DIABETES MELLITUS WITH HYPERGLYCEMIA, WITH LONG-TERM CURRENT USE OF INSULIN (H): Primary | ICD-10-CM

## 2018-06-14 DIAGNOSIS — E11.65 TYPE 2 DIABETES MELLITUS WITH HYPERGLYCEMIA, WITH LONG-TERM CURRENT USE OF INSULIN (H): Primary | ICD-10-CM

## 2018-06-14 NOTE — TELEPHONE ENCOUNTER
Called Annalee to let her know that the Basaglar is not covered by her insurance, but Lantus is. Will be sending a prescription to [facility] for Lantus. I did ask Annalee what meter she has so I can send in new prescription to [facility] for testing 3x per day with Smartview test strips. Annalee states FBG today is 160. Will call Annalee on Monday for further BG review.

## 2018-06-14 NOTE — PROGRESS NOTES
"Elizabeth is here to start basal insulin. /80  Pulse 78  Ht 1.626 m (5' 4\")  Wt 115.3 kg (254 lb 3.2 oz)  SpO2 97%  BMI 43.63 kg/m2 She is taking Metformin 1000 mg bid. Elizabeth states that she is feeling much better since stopping Trulicity. Received faxed, signed ok to start a basal insulin.    Gave Elizabeth a sample Basaglar pen. Will need to run prescription to determine coverage. Instructed her on insulin storage, action, how to use the pen, dosage, injection site selection/rotation, pen needle disposal.    Plan:Start Basaglar 10 units daily    Test BG twice a day: every AM before breakfast or coffee and 2 hours after supper. (I will order enough for 3 times daily)    I will you on 6/15 for BG readings    Please if you lows/concerns: [phone]    Time spent with patient 40 minutes.    "

## 2018-06-19 ENCOUNTER — TELEPHONE (OUTPATIENT)
Dept: EDUCATION SERVICES | Facility: HOSPITAL | Age: 53
End: 2018-06-19

## 2018-06-19 DIAGNOSIS — E11.65 TYPE 2 DIABETES MELLITUS WITH HYPERGLYCEMIA, WITH LONG-TERM CURRENT USE OF INSULIN (H): Primary | ICD-10-CM

## 2018-06-19 DIAGNOSIS — Z79.4 TYPE 2 DIABETES MELLITUS WITH HYPERGLYCEMIA, WITH LONG-TERM CURRENT USE OF INSULIN (H): Primary | ICD-10-CM

## 2018-06-19 NOTE — TELEPHONE ENCOUNTER
Called Elizabeth. She is taking Metformin 1000 mg bid and Lantus 10 units daily. Reports fasting BG in 160s to 170s after taking Basaglar (sample pen) 10 units daily for about a week. She has now picked up her Lantus which is covered by insurance. She wants to continue with Lantus 10 units daily for the next week to see if there are any changes with the switch from Basaglar to Lantus. Will follow next week by phone for BG review and insulin adjust.

## 2018-07-03 ENCOUNTER — TELEPHONE (OUTPATIENT)
Dept: EDUCATION SERVICES | Facility: HOSPITAL | Age: 53
End: 2018-07-03

## 2018-07-09 ENCOUNTER — TELEPHONE (OUTPATIENT)
Dept: EDUCATION SERVICES | Facility: HOSPITAL | Age: 53
End: 2018-07-09

## 2018-07-09 NOTE — TELEPHONE ENCOUNTER
Called Elizabeth. Last week we had increased her Lantus to 15 units daily. She is also taking Metformin 1000 mg bid. BG readings remain in 160's fasting with a fasting this weekend of 270. Annalee reports BG of 132 before supper and 120 an hour after exercise. She is very unhappy and frustrated with taking the insulin and states that she is mostly trying to eat meat just to get her BG readings down. She is also drinking at least 10 glasses of water per day. Elizabeth states that she wants to quit taking the insulin.    Elizabeth is requesting that we try the one type of pill that we previously talked about, but haven't tried yet. She is referring to Invokana or Jardiance. Elizabeth would like to stop the Insulin and try the Invokana. If that doesn't work, she will go back to insulin. I told her that I would contact Dr. Danae Yusuf regarding this. In the meantime, Elizabeth  Agrees to increase the Lantus to 20 units daily.    Will follow with Elizabeth with Dr. Yusuf reply.

## 2018-07-11 ENCOUNTER — TELEPHONE (OUTPATIENT)
Dept: EDUCATION SERVICES | Facility: HOSPITAL | Age: 53
End: 2018-07-11

## 2018-07-19 ENCOUNTER — TELEPHONE (OUTPATIENT)
Dept: EDUCATION SERVICES | Facility: HOSPITAL | Age: 53
End: 2018-07-19

## 2018-07-19 NOTE — TELEPHONE ENCOUNTER
Patient called saying that she is following up on a medication. Did not say which one. Would like Brenda to call back please [phone]

## 2018-07-21 NOTE — TELEPHONE ENCOUNTER
Called Annalee on 7/19/18. She has started Invokana 100 mg daily. Reports Bg readings in 130s to 145. She is watching carbs eating from 50 to 100 gm/day and she is working on exercising regularly. Reports that she is drinking water and has been urinating a lot. Annalee is asking if there is a higher dose of Invokana and I told her that there is a 300 mg daily dose. Will continue on current dose and will followin 2 weeks.

## 2018-07-24 ENCOUNTER — TRANSFERRED RECORDS (OUTPATIENT)
Dept: HEALTH INFORMATION MANAGEMENT | Facility: HOSPITAL | Age: 53
End: 2018-07-24

## 2018-07-31 ENCOUNTER — TELEPHONE (OUTPATIENT)
Dept: INTERVENTIONAL RADIOLOGY/VASCULAR | Facility: HOSPITAL | Age: 53
End: 2018-07-31

## 2018-07-31 NOTE — TELEPHONE ENCOUNTER
STEROID INJECTION REMINDER CALL    Called to remind patient of appointment on 08/02/2018 at 1430    Patient has not had a flu shot in the last two weeks.  Patient has not had immunizations in the last two weeks.  Patient has not had a steroid injection in the last two weeks.  Patient has not taken antibiotics in the past two weeks.    Discussed after care, and explained that a  needs to accompany patient to these appointments.  Patient verbalized an understanding of the  requirement.

## 2018-08-02 ENCOUNTER — HOSPITAL ENCOUNTER (OUTPATIENT)
Dept: INTERVENTIONAL RADIOLOGY/VASCULAR | Facility: HOSPITAL | Age: 53
Discharge: HOME | End: 2018-08-02
Attending: FAMILY MEDICINE | Admitting: FAMILY MEDICINE
Payer: MEDICARE

## 2018-08-02 DIAGNOSIS — M25.552 LEFT HIP PAIN: ICD-10-CM

## 2018-08-02 DIAGNOSIS — M19.90 OSTEOARTHRITIS: ICD-10-CM

## 2018-08-02 PROCEDURE — 20610 DRAIN/INJ JOINT/BURSA W/O US: CPT | Mod: TC,LT

## 2018-08-02 PROCEDURE — 25000128 H RX IP 250 OP 636: Performed by: FAMILY MEDICINE

## 2018-08-02 PROCEDURE — 25000125 ZZHC RX 250: Performed by: FAMILY MEDICINE

## 2018-08-02 RX ORDER — IOPAMIDOL 612 MG/ML
50 INJECTION, SOLUTION INTRAVASCULAR ONCE
Status: COMPLETED | OUTPATIENT
Start: 2018-08-02 | End: 2018-08-02

## 2018-08-02 RX ORDER — LIDOCAINE HYDROCHLORIDE 10 MG/ML
INJECTION, SOLUTION EPIDURAL; INFILTRATION; INTRACAUDAL; PERINEURAL
Status: DISCONTINUED
Start: 2018-08-02 | End: 2018-08-03 | Stop reason: HOSPADM

## 2018-08-02 RX ORDER — METHYLPREDNISOLONE ACETATE 80 MG/ML
INJECTION, SUSPENSION INTRA-ARTICULAR; INTRALESIONAL; INTRAMUSCULAR; SOFT TISSUE
Status: DISCONTINUED
Start: 2018-08-02 | End: 2018-08-03 | Stop reason: HOSPADM

## 2018-08-02 RX ORDER — LIDOCAINE HYDROCHLORIDE 10 MG/ML
10 INJECTION, SOLUTION EPIDURAL; INFILTRATION; INTRACAUDAL; PERINEURAL ONCE
Status: COMPLETED | OUTPATIENT
Start: 2018-08-02 | End: 2018-08-02

## 2018-08-02 RX ORDER — METHYLPREDNISOLONE ACETATE 80 MG/ML
80 INJECTION, SUSPENSION INTRA-ARTICULAR; INTRALESIONAL; INTRAMUSCULAR; SOFT TISSUE ONCE
Status: COMPLETED | OUTPATIENT
Start: 2018-08-02 | End: 2018-08-02

## 2018-08-02 RX ADMIN — IOPAMIDOL 2 ML: 612 INJECTION, SOLUTION INTRAVENOUS at 14:51

## 2018-08-02 RX ADMIN — LIDOCAINE HYDROCHLORIDE 4 ML: 10 INJECTION, SOLUTION EPIDURAL; INFILTRATION; INTRACAUDAL; PERINEURAL at 14:52

## 2018-08-02 RX ADMIN — METHYLPREDNISOLONE ACETATE 80 MG: 80 INJECTION, SUSPENSION INTRA-ARTICULAR; INTRALESIONAL; INTRAMUSCULAR; SOFT TISSUE at 14:51

## 2018-08-02 NOTE — IP AVS SNAPSHOT
MRN:[id number]                      After Visit Summary   8/2/2018    Elizabeth Rankin    MRN: [id number]           Visit Information        Provider Department      8/2/2018  2:30 PM HIIRRAD; HIIR1 [facility]           Review of your medicines      UNREVIEWED medicines. Ask your doctor about these medicines        Dose / Directions    adalimumab 40 MG/0.8ML prefilled syringe kit   Commonly known as:  HUMIRA        Dose:  40 mg   Inject 40 mg Subcutaneous every 7 days   Refills:  0       CELEBREX PO        Dose:  400 mg   Take 400 mg by mouth 2 times daily   Refills:  0       metFORMIN 1000 MG tablet   Commonly known as:  GLUCOPHAGE        Dose:  1 tablet   Take 1 tablet by mouth 2 times daily (with meals).   Refills:  0         CONTINUE these medicines which have NOT CHANGED        Dose / Directions    blood glucose monitoring test strip   Commonly known as:  ACCU-CHEK SMARTVIEW   Used for:  Type 2 diabetes mellitus with hyperglycemia, with long-term current use of insulin (H)        Use to test blood sugar 3 times daily.   Quantity:  100 each   Refills:  6       insulin pen needle 32G X 4 MM   Used for:  Type 2 diabetes mellitus with hyperglycemia, with long-term current use of insulin (H)        Use 1 pen needle daily.   Quantity:  100 each   Refills:  10                Protect others around you: Learn how to safely use, store and throw away your medicines at www.disposemymeds.org.         Follow-ups after your visit        Your next 10 appointments already scheduled     Aug 31, 2018  9:15 AM CDT   (Arrive by 9:00 AM)   Return Visit with Garcia Hernandez   [facility] ([facility] )    [address] [place] [address]   [phone]            Dec 12, 2018  3:45 PM CST   (Arrive by 3:30 PM)   Return Visit with Radhika Barron PA-C   [facility] ([facility] )    [address] [place] [address]   [phone]        "        Care Instructions        Further instructions from your care team       Home number on file [phone] (home)  Is it ok to leave a message at this number(s)? Yes    Dr. Jimenez completed your procedure on 8/2/2018.    Current Pain Level (0-10 Scale): 3/10  Post Pain Level (0-10):  0/10    Radiology Discharge instructions for Steroid Injection    Activity Level:     Do not do any heavy activity or exercise for 24 hours.   Do not drive for 4 hours after your injection.  Diet:   Return to your normal diet.  Medications:   If you have stopped taking your Aspirin, Coumadin/Warfarin, Ibuprofen, or any   other blood thinner for this procedure you may resume in the morning unless   your primary care provider has given you other instructions.    Diabetics may see an increase in blood sugar after steroid injections. If you are concerned about your blood sugar, please contact your family doctor.    Site Care:  Remove the bandage and bathe or shower the morning after the procedure.      Please allow two weeks to experience improvement in your pain.  If you have any further issues, please contact your provider.    Call your Primary Care Provider if you have the following (if your primary care provider is not available please seek emergency care):   Nausea with vomiting   Severe headache   Drowsiness or confusion   Redness or drainage at the injection or puncture site   Temperature over 101 degrees F   Other concerns   Worsening back pain   Stiff neck           Additional Information About Your Visit        [employer] Information     [employer] lets you send messages to your doctor, view your test results, renew your prescriptions, schedule appointments and more. To sign up, go to [email or web address] . Click on \"Log in\" on the left side of the screen, which will take you to the Welcome page. Then click on \"Sign up Now\" on the right side of the page.     You will be asked to enter the access code listed below, as well as " some personal information. Please follow the directions to create your username and password.     Your access code is: [email or web address]  Expires: 2018  2:00 PM     Your access code will  in 90 days. If you need help or a new code, please call your [place] clinic or [phone].        Care EveryWhere ID     This is your Care EveryWhere ID. This could be used by other organizations to access your [place] medical records  [id number]         Primary Care Provider Office Phone # Fax #    Danae Yusuf [phone]      Equal Access to Services     [facility]: Hadii nathanael corral hadcaroleo Soivanna, waaxda luqadaha, qaybta kaalmada ruddy, omero gaytan . So [facility] [phone].    ATENCIÓN: Si habla español, tiene a lind disposición servicios gratuitos de asistencia lingüística. [facility] [phone].    We comply with applicable federal civil rights laws and [place] laws. We do not discriminate on the basis of race, color, national origin, age, disability, sex, sexual orientation, or gender identity.            Thank you!     Thank you for choosing [facility] for your care. Our goal is always to provide you with excellent care. Hearing back from our patients is one way we can continue to improve our services. Please take a few minutes to complete the written survey that you may receive in the mail after you visit with us. Thank you!             Medication List: This is a list of all your medications and when to take them. Check marks below indicate your daily home schedule. Keep this list as a reference.      Medications           Morning Afternoon Evening Bedtime As Needed    adalimumab 40 MG/0.8ML prefilled syringe kit   Commonly known as:  HUMIRA   Inject 40 mg Subcutaneous every 7 days                                blood glucose monitoring test strip   Commonly known as:  ACCU-CHEK SMARTVIEW   Use to test blood sugar 3 times daily.                                 CELEBREX PO   Take 400 mg by mouth 2 times daily                                insulin pen needle 32G X 4 MM   Use 1 pen needle daily.                                metFORMIN 1000 MG tablet   Commonly known as:  GLUCOPHAGE   Take 1 tablet by mouth 2 times daily (with meals).

## 2018-08-02 NOTE — DISCHARGE INSTRUCTIONS
Home number on file [phone] (home)  Is it ok to leave a message at this number(s)? Yes    Dr. Jimenez completed your procedure on 8/2/2018.    Current Pain Level (0-10 Scale): 3/10  Post Pain Level (0-10):  0/10    Radiology Discharge instructions for Steroid Injection    Activity Level:     Do not do any heavy activity or exercise for 24 hours.   Do not drive for 4 hours after your injection.  Diet:   Return to your normal diet.  Medications:   If you have stopped taking your Aspirin, Coumadin/Warfarin, Ibuprofen, or any   other blood thinner for this procedure you may resume in the morning unless   your primary care provider has given you other instructions.    Diabetics may see an increase in blood sugar after steroid injections. If you are concerned about your blood sugar, please contact your family doctor.    Site Care:  Remove the bandage and bathe or shower the morning after the procedure.      Please allow two weeks to experience improvement in your pain.  If you have any further issues, please contact your provider.    Call your Primary Care Provider if you have the following (if your primary care provider is not available please seek emergency care):   Nausea with vomiting   Severe headache   Drowsiness or confusion   Redness or drainage at the injection or puncture site   Temperature over 101 degrees F   Other concerns   Worsening back pain   Stiff neck

## 2018-08-02 NOTE — IP AVS SNAPSHOT
[facility]    [address] [address]    Phone:  [phone]    Fax:  [phone]                                       After Visit Summary   8/2/2018    Elizabeth Rankin    MRN: [id number]           After Visit Summary Signature Page     I have received my discharge instructions, and my questions have been answered. I have discussed any challenges I see with this plan with the nurse or doctor.    ..........................................................................................................................................  Patient/Patient Representative Signature      ..........................................................................................................................................  Patient Representative Print Name and Relationship to Patient    ..................................................               ................................................  Date                                            Time    ..........................................................................................................................................  Reviewed by Signature/Title    ...................................................              ..............................................  Date                                                            Time

## 2018-08-31 ENCOUNTER — OFFICE VISIT (OUTPATIENT)
Dept: AUDIOLOGY | Facility: OTHER | Age: 53
End: 2018-08-31
Attending: AUDIOLOGIST
Payer: MEDICARE

## 2018-08-31 ENCOUNTER — HOSPITAL ENCOUNTER (OUTPATIENT)
Dept: MRI IMAGING | Facility: HOSPITAL | Age: 53
Discharge: HOME | End: 2018-08-31
Attending: FAMILY MEDICINE | Admitting: FAMILY MEDICINE
Payer: MEDICARE

## 2018-08-31 DIAGNOSIS — M25.561 RIGHT KNEE PAIN: ICD-10-CM

## 2018-08-31 DIAGNOSIS — H90.8 MIXED HEARING LOSS, UNILATERAL: ICD-10-CM

## 2018-08-31 DIAGNOSIS — H90.3 SENSORINEURAL HEARING LOSS, BILATERAL: ICD-10-CM

## 2018-08-31 DIAGNOSIS — M25.361 KNEE INSTABILITY, RIGHT: ICD-10-CM

## 2018-08-31 PROCEDURE — V5266 BATTERY FOR HEARING DEVICE: HCPCS | Performed by: AUDIOLOGIST

## 2018-08-31 PROCEDURE — 92593 ZZHC HEARING AID CHECK, BINAURAL: CPT | Performed by: AUDIOLOGIST

## 2018-08-31 PROCEDURE — 73721 MRI JNT OF LWR EXTRE W/O DYE: CPT | Mod: TC,RT

## 2018-08-31 NOTE — MR AVS SNAPSHOT
After Visit Summary   8/31/2018    Elizabeth Rankin    MRN: [id number]           Patient Information     Date Of Birth          1965        Visit Information        Provider Department      8/31/2018 9:15 AM Ursula Mcdowell AuD [facility] [place]        Today's Diagnoses     Sensorineural hearing loss, bilateral        Mixed hearing loss, unilateral           Follow-ups after your visit        Your next 10 appointments already scheduled     Dec 12, 2018  3:45 PM CST   (Arrive by 3:30 PM)   Return Visit with Radhika Barron PA-C   [facility] [place] ([facility] [place] )    [address]  [place] [address]   [phone]              Who to contact     If you have questions or need follow up information about today's clinic visit or your schedule please contact [facility] directly at [phone].  Normal or non-critical lab and imaging results will be communicated to you by MyChart, letter or phone within 4 business days after the clinic has received the results. If you do not hear from us within 7 days, please contact the clinic through MyChart or phone. If you have a critical or abnormal lab result, we will notify you by phone as soon as possible.  Submit refill requests through [employer] or call your pharmacy and they will forward the refill request to us. Please allow 3 business days for your refill to be completed.          Additional Information About Your Visit        Care EveryWhere ID     This is your Care EveryWhere ID. This could be used by other organizations to access your [place] medical records  [id number]         Blood Pressure from Last 3 Encounters:   06/12/18 130/80   06/12/18 132/84   04/26/18 156/89    Weight from Last 3 Encounters:   06/12/18 254 lb 3.2 oz (115.3 kg)   06/12/18 250 lb (113.4 kg)   04/26/18 255 lb 12.8 oz (116 kg)              Today, you had the following     No orders found for display       Primary Care Provider Office  Phone # Fax #    Danae Yusuf [phone]       [facility] [address] [facility] [address]        Equal Access to Services     TIGRE ESCOBAR : Hadii aad ku hadcaroleo Sokellali, waaxda luqadaha, qaybta kaalmada ruddy, omero poeregla fourniermarie judsondeltamarty morillo. So [facility] [place] [facility] [phone].    ATENCIÓN: Si habla español, tiene a lind disposición servicios gratuitos de asistencia lingüística. Llame al [phone].    We comply with applicable federal civil rights laws and [place] laws. We do not discriminate on the basis of race, color, national origin, age, disability, sex, sexual orientation, or gender identity.            Thank you!     Thank you for choosing [facility]  for your care. Our goal is always to provide you with excellent care. Hearing back from our patients is one way we can continue to improve our services. Please take a few minutes to complete the written survey that you may receive in the mail after your visit with us. Thank you!             Your Updated Medication List - Protect others around you: Learn how to safely use, store and throw away your medicines at www.disposemymeds.org.          This list is accurate as of 8/31/18  9:30 AM.  Always use your most recent med list.                   Brand Name Dispense Instructions for use Diagnosis    adalimumab 40 MG/0.8ML prefilled syringe kit    HUMIRA     Inject 40 mg Subcutaneous every 7 days        blood glucose monitoring test strip    ACCU-CHEK SMARTVIEW    100 each    Use to test blood sugar 3 times daily.    Type 2 diabetes mellitus with hyperglycemia, with long-term current use of insulin (H)       CELEBREX PO      Take 400 mg by mouth 2 times daily        insulin pen needle 32G X 4 MM     100 each    Use 1 pen needle daily.    Type 2 diabetes mellitus with hyperglycemia, with long-term current use of insulin (H)       metFORMIN 1000 MG tablet    GLUCOPHAGE     Take 1 tablet by mouth 2 times  daily (with meals).

## 2018-08-31 NOTE — PROGRESS NOTES
Background: Patient seen for binaural hearing aid check. The [employer] [id number] right c-shell earmold which is in warranty with expiraton 9/2/19 needs repair thru vendor. THe c-stop grey color has come off.    Results: The patient has lost her previousright medium closed dome  IN-THE-CANAL (JEANNIE) #1 so requests another so that she can use her hearing aids while the c-shell earmold is being repaired.    A new size 1  with medium closed dome was put on aid and the c-shell is sent to vendor.    Also she has a perforation in left eardrum and asks for volume to be increased for better balance which the left overall gain increased +6dBHL and she reports satisfaction.    Batteries requested and 32 cells given.    Recommendations: Once repaired, hearng center support staff may switch out the receivers and both will be returned to patient.  She is appreciative and has no further questions.    Ursula Mcdowell M.S., [facility]  Audiologist #[id number]

## 2018-11-15 ENCOUNTER — HOSPITAL ENCOUNTER (OUTPATIENT)
Dept: INTERVENTIONAL RADIOLOGY/VASCULAR | Facility: HOSPITAL | Age: 53
Discharge: HOME | End: 2018-11-15
Attending: ORTHOPAEDIC SURGERY | Admitting: ORTHOPAEDIC SURGERY
Payer: MEDICARE

## 2018-11-15 DIAGNOSIS — M16.12 PRIMARY OSTEOARTHRITIS OF LEFT HIP: ICD-10-CM

## 2018-11-15 PROCEDURE — 77002 NEEDLE LOCALIZATION BY XRAY: CPT | Mod: TC

## 2018-11-15 PROCEDURE — 25000125 ZZHC RX 250: Performed by: RADIOLOGY

## 2018-11-15 PROCEDURE — 25500064 ZZH RX 255 OP 636: Performed by: RADIOLOGY

## 2018-11-15 RX ORDER — LIDOCAINE HYDROCHLORIDE 10 MG/ML
INJECTION, SOLUTION EPIDURAL; INFILTRATION; INTRACAUDAL; PERINEURAL
Status: DISPENSED
Start: 2018-11-15 | End: 2018-11-15

## 2018-11-15 RX ORDER — METHYLPREDNISOLONE ACETATE 80 MG/ML
80 INJECTION, SUSPENSION INTRA-ARTICULAR; INTRALESIONAL; INTRAMUSCULAR; SOFT TISSUE ONCE
Status: COMPLETED | OUTPATIENT
Start: 2018-11-15 | End: 2018-11-15

## 2018-11-15 RX ORDER — LIDOCAINE HYDROCHLORIDE 10 MG/ML
5 INJECTION, SOLUTION EPIDURAL; INFILTRATION; INTRACAUDAL; PERINEURAL ONCE
Status: COMPLETED | OUTPATIENT
Start: 2018-11-15 | End: 2018-11-15

## 2018-11-15 RX ORDER — IOPAMIDOL 612 MG/ML
30 INJECTION, SOLUTION INTRAVASCULAR ONCE
Status: COMPLETED | OUTPATIENT
Start: 2018-11-15 | End: 2018-11-15

## 2018-11-15 RX ORDER — METHYLPREDNISOLONE ACETATE 80 MG/ML
INJECTION, SUSPENSION INTRA-ARTICULAR; INTRALESIONAL; INTRAMUSCULAR; SOFT TISSUE
Status: DISPENSED
Start: 2018-11-15 | End: 2018-11-15

## 2018-11-15 RX ADMIN — LIDOCAINE HYDROCHLORIDE 4 ML: 10 INJECTION, SOLUTION EPIDURAL; INFILTRATION; INTRACAUDAL; PERINEURAL at 11:38

## 2018-11-15 RX ADMIN — METHYLPREDNISOLONE ACETATE 80 MG: 80 INJECTION, SUSPENSION INTRA-ARTICULAR; INTRALESIONAL; INTRAMUSCULAR; SOFT TISSUE at 11:39

## 2018-11-15 RX ADMIN — IOPAMIDOL 2 ML: 612 INJECTION, SOLUTION INTRAVENOUS at 11:39

## 2018-11-15 NOTE — DISCHARGE INSTRUCTIONS
Home number on file [phone] (home)  Is it ok to leave a message at this number(s)? Yes    Dr. Jimenez completed your procedure on 11/15/2018.    Current Pain Level (0-10 Scale): 4/10  Post Pain Level (0-10):  4/10    Radiology Discharge instructions for Steroid Injection    Activity Level:     Do not do any heavy activity or exercise for 24 hours.   Do not drive for 4 hours after your injection.  Diet:   Return to your normal diet.  Medications:   If you have stopped taking your Aspirin, Coumadin/Warfarin, Ibuprofen, or any   other blood thinner for this procedure you may resume in the morning unless   your primary care provider has given you other instructions.    Diabetics may see an increase in blood sugar after steroid injections. If you are concerned about your blood sugar, please contact your family doctor.    Site Care:  Remove the bandage and bathe or shower the morning after the procedure.      Please allow two weeks to experience improvement in your pain.  If you have any further issues, please contact your provider.    Call your Primary Care Provider if you have the following (if your primary care provider is not available please seek emergency care):   Nausea with vomiting   Severe headache   Drowsiness or confusion   Redness or drainage at the injection or puncture site   Temperature over 101 degrees F   Other concerns   Worsening back pain   Stiff neck

## 2018-11-15 NOTE — IP AVS SNAPSHOT
MRN:[id number]                      After Visit Summary   11/15/2018    Elizabeth Rankin    MRN: [id number]           Visit Information        Provider Department      11/15/2018 11:30 AM HIIRRAD; HIIR1 [facility]           Review of your medicines      UNREVIEWED medicines. Ask your doctor about these medicines        Dose / Directions    adalimumab 40 MG/0.8ML prefilled syringe kit   Commonly known as:  HUMIRA        Dose:  40 mg   Inject 40 mg Subcutaneous every 7 days   Refills:  0       CELEBREX PO        Dose:  400 mg   Take 400 mg by mouth 2 times daily   Refills:  0       metFORMIN 1000 MG tablet   Commonly known as:  GLUCOPHAGE        Dose:  1 tablet   Take 1 tablet by mouth 2 times daily (with meals).   Refills:  0         CONTINUE these medicines which have NOT CHANGED        Dose / Directions    blood glucose monitoring test strip   Commonly known as:  ACCU-CHEK SMARTVIEW   Used for:  Type 2 diabetes mellitus with hyperglycemia, with long-term current use of insulin (H)        Use to test blood sugar 3 times daily.   Quantity:  100 each   Refills:  6       insulin pen needle 32G X 4 MM   Used for:  Type 2 diabetes mellitus with hyperglycemia, with long-term current use of insulin (H)        Use 1 pen needle daily.   Quantity:  100 each   Refills:  10                Protect others around you: Learn how to safely use, store and throw away your medicines at www.disposemymeds.org.         Follow-ups after your visit        Your next 10 appointments already scheduled     Dec 12, 2018  3:45 PM CST   (Arrive by 3:30 PM)   Return Visit with Radhika Barron PA-C   [facility] ([facility] )    [address] [place] [address] [place] [address]   [phone]               Care Instructions        Further instructions from your care team       Home number on file [phone] (home)  Is it ok to leave a message at this number(s)? Yes    Dr. Jimenez completed your  procedure on 11/15/2018.    Current Pain Level (0-10 Scale): 4/10  Post Pain Level (0-10):  4/10    Radiology Discharge instructions for Steroid Injection    Activity Level:     Do not do any heavy activity or exercise for 24 hours.   Do not drive for 4 hours after your injection.  Diet:   Return to your normal diet.  Medications:   If you have stopped taking your Aspirin, Coumadin/Warfarin, Ibuprofen, or any   other blood thinner for this procedure you may resume in the morning unless   your primary care provider has given you other instructions.    Diabetics may see an increase in blood sugar after steroid injections. If you are concerned about your blood sugar, please contact your family doctor.    Site Care:  Remove the bandage and bathe or shower the morning after the procedure.      Please allow two weeks to experience improvement in your pain.  If you have any further issues, please contact your provider.    Call your Primary Care Provider if you have the following (if your primary care provider is not available please seek emergency care):   Nausea with vomiting   Severe headache   Drowsiness or confusion   Redness or drainage at the injection or puncture site   Temperature over 101 degrees F   Other concerns   Worsening back pain   Stiff neck           Additional Information About Your Visit        Care EveryWhere ID     This is your Care EveryWhere ID. This could be used by other organizations to access your [facility] medical records  [id number]         Primary Care Provider Office Phone # Fax #    Danae Yusuf [phone]      Equal Access to Services     LIS ESCOBAR : Monica corral hadasho Sokellali, waaxda luqadaha, qaybta kaalmada ruddy, omero morillo. So [facility] [phone].    ATENCIÓN: Si habla español, tiene a lind disposición servicios gratuitos de asistencia lingüística. Jeniffer al [phone].    We comply with applicable federal civil rights  laws and [place] laws. We do not discriminate on the basis of race, color, national origin, age, disability, sex, sexual orientation, or gender identity.            Thank you!     Thank you for choosing [place] [facility] for your care. Our goal is always to provide you with excellent care. Hearing back from our patients is one way we can continue to improve our services. Please take a few minutes to complete the written survey that you may receive in the mail after you visit with us. Thank you!             Medication List: This is a list of all your medications and when to take them. Check marks below indicate your daily home schedule. Keep this list as a reference.      Medications           Morning Afternoon Evening Bedtime As Needed    adalimumab 40 MG/0.8ML prefilled syringe kit   Commonly known as:  HUMIRA   Inject 40 mg Subcutaneous every 7 days                                blood glucose monitoring test strip   Commonly known as:  ACCU-CHEK SMARTVIEW   Use to test blood sugar 3 times daily.                                CELEBREX PO   Take 400 mg by mouth 2 times daily                                insulin pen needle 32G X 4 MM   Use 1 pen needle daily.                                metFORMIN 1000 MG tablet   Commonly known as:  GLUCOPHAGE   Take 1 tablet by mouth 2 times daily (with meals).

## 2018-11-15 NOTE — IP AVS SNAPSHOT
[facility]    [address] [address]    Phone:  [phone]    Fax:  [phone]                                       After Visit Summary   11/15/2018    Elizabeth Rankin    MRN: [id number]           After Visit Summary Signature Page     I have received my discharge instructions, and my questions have been answered. I have discussed any challenges I see with this plan with the nurse or doctor.    ..........................................................................................................................................  Patient/Patient Representative Signature      ..........................................................................................................................................  Patient Representative Print Name and Relationship to Patient    ..................................................               ................................................  Date                                   Time    ..........................................................................................................................................  Reviewed by Signature/Title    ...................................................              ..............................................  Date                                               Time          22EPIC Rev 08/18

## 2018-12-03 ENCOUNTER — ALLIED HEALTH/NURSE VISIT (OUTPATIENT)
Dept: AUDIOLOGY | Facility: OTHER | Age: 53
End: 2018-12-03
Attending: AUDIOLOGIST
Payer: MEDICARE

## 2018-12-03 DIAGNOSIS — H90.3 SENSORINEURAL HEARING LOSS, BILATERAL: Primary | ICD-10-CM

## 2018-12-03 PROCEDURE — V5266 BATTERY FOR HEARING DEVICE: HCPCS

## 2018-12-12 ENCOUNTER — OFFICE VISIT (OUTPATIENT)
Dept: OTOLARYNGOLOGY | Facility: OTHER | Age: 53
End: 2018-12-12
Attending: PHYSICIAN ASSISTANT
Payer: MEDICARE

## 2018-12-12 VITALS
SYSTOLIC BLOOD PRESSURE: 134 MMHG | HEART RATE: 74 BPM | BODY MASS INDEX: 43.4 KG/M2 | WEIGHT: 254.19 LBS | DIASTOLIC BLOOD PRESSURE: 80 MMHG | OXYGEN SATURATION: 98 % | HEIGHT: 64 IN | TEMPERATURE: 98.3 F

## 2018-12-12 DIAGNOSIS — H90.A32 MIXED CONDUCTIVE AND SENSORINEURAL HEARING LOSS OF LEFT EAR WITH RESTRICTED HEARING OF RIGHT EAR: ICD-10-CM

## 2018-12-12 DIAGNOSIS — H72.92 PERFORATION OF TYMPANIC MEMBRANE, LEFT: Primary | ICD-10-CM

## 2018-12-12 DIAGNOSIS — Z98.890 HISTORY OF MYRINGOTOMY: ICD-10-CM

## 2018-12-12 DIAGNOSIS — M26.609 TMJ (TEMPOROMANDIBULAR JOINT SYNDROME): ICD-10-CM

## 2018-12-12 DIAGNOSIS — H90.3 SENSORINEURAL HEARING LOSS (SNHL) OF BOTH EARS: ICD-10-CM

## 2018-12-12 PROCEDURE — 99213 OFFICE O/P EST LOW 20 MIN: CPT | Performed by: PHYSICIAN ASSISTANT

## 2018-12-12 PROCEDURE — G0463 HOSPITAL OUTPT CLINIC VISIT: HCPCS

## 2018-12-12 RX ORDER — MODAFINIL 100 MG/1
100 TABLET ORAL DAILY
COMMUNITY

## 2018-12-12 ASSESSMENT — MIFFLIN-ST. JEOR: SCORE: 1753

## 2018-12-12 ASSESSMENT — PAIN SCALES - GENERAL: PAINLEVEL: NO PAIN (0)

## 2018-12-12 NOTE — PATIENT INSTRUCTIONS
Start TMJ precautions.   Consider physical therapy.   Watch clenching.     Left ar is stable. Left ear drum remains perforated. Stable  Recheck in 6 months.   If ear pain- contact nurse- May consider CT scan  Audiogram due in March/ April    Thank you for allowing BRIAN Prieto and our ENT team to participate in your care.  If your medications are too expensive, please give the nurse a call.  We can possibly change this medication.  If you have a scheduling or an appointment question please contact our Health Unit Coordinator at their direct line [phone].   ALL nursing questions or concerns can be directed to your ENT nurse at: [phone] Nicole

## 2018-12-12 NOTE — PROGRESS NOTES
Chief Complaint   Patient presents with     Follow Up     left TM perf, ETD jeff, SNHL jeff   patient returns for recheck of Left TM perforation.   No concerns.   She does have intermittent moisture in her left canal. Denies bloody, foul smelling drainage.   Fern has noticed an increase in clenching, fractured a tooth. She has seen DDS and recommend f/u.   She has felt jaw popping at times, right. No recent PT. Defers oral surgery.     She has a hx of BTTI with Dr. Ruelas on 9/30/16. She was seen S/p BTT by MARICHUY, NP on 10/31/16 with normal postop exam.   She was last seen by myself on 2/15/18; right tube was extruding and left TM perforation. Since, she has noted pulsatile tinnitus resolved. Her hearing remains decreased on left. Right ear mild fullness intermittently.      She denies active otorrhea, but does feel left ear has moisture at times.   Bilateral hearing aids with good success.   Denies otalgia.   Audiogram from 8/2017 reviewed with patient.   No flux HL or vertigo  Reviewed audiogram with patient today. Right ear stable, TYpe A tympanogram. Left ear with mixed loss, typ B large volume, c/w perforation.        Past Medical History:   Diagnosis Date     Bicuspid aortic valve      Bicuspid aortic valve      Depression      DM type 2 (diabetes mellitus, type 2) (H)      Hyperlipidemia      Nondependent alcohol abuse, unspecified drinking b 1/2/2001        Allergies   Allergen Reactions     Dulaglutide Other (See Comments)     confusion     Erythromycin Hives     Able to tolerate Zithromax:  Allergy     Januvia [Sitagliptin]      Joint pain, nausea     Meloxicam Swelling     Swelling of lower extremeties; chest pain     Oxycodone      Suppressed respirations     Percocet [Oxycodone-Acetaminophen] Itching     Ritalin [Methylphenidate] Other (See Comments)     Over stimulation     Ritalin [Methylphenidate]      Tramadol      sedation     Tylenol With Codeine [Acetaminophen-Codeine] Nausea     Vistaril  "[Hydroxyzine]      Dizzy, ankle swelling       Vortioxetine Nausea     Headache, neck pain     Welchol [Colesevelam]      Joint pain, muscle weakness     Strattera [Atomoxetine] Anxiety     Valium [Diazepam] Rash     Current Outpatient Medications   Medication     adalimumab (HUMIRA) 40 MG/0.8ML prefilled syringe kit     blood glucose monitoring (ACCU-CHEK SMARTVIEW) test strip     canaliflozin (INVOKANA) tablet     Celecoxib (CELEBREX PO)     insulin pen needle 32G X 4 MM     metFORMIN (GLUCOPHAGE) 1000 MG tablet     modafinil (PROVIGIL) 100 MG tablet     No current facility-administered medications for this visit.       ROS: 10 point ROS neg other than the symptoms noted above in the HPI.  /80 (BP Location: Left arm, Patient Position: Sitting, Cuff Size: Adult Large)   Pulse 74   Temp 98.3  F (36.8  C) (Tympanic)   Ht 1.626 m (5' 4\")   Wt 115.3 kg (254 lb 3.1 oz)   SpO2 98%   BMI 43.63 kg/m    General - The patient is well nourished and well developed, and appears to have good nutritional status.  Alert and oriented to person and place, interactive.  Head and Face - Normocephalic and atraumatic, with no gross asymmetry noted of the contour of the facial features.  The facial nerve is intact, with strong symmetric movements.  Neck-no palpable lymphadenopathy or thyroid mass.  Trachea is midline.  Eyes - Extraocular movements intact.   Ears- External auditory canals are patent, tympanic membranes- Left canal clear. Left TM appears with perforation, 20% Anterior inferior quadrant. Middle ear appears healthy.   Right TM intact without effusion or retraction.   Nose - Nasal mucosa is pink and moist with no abnormal mucus.  The septum was grossly midline aside from left septal spur with contact  turbinates enlarged with right jasen  No polyps, masses, or purulence noted on examination.   Mouth - Examination of the oral cavity shows pink, healthy, moist mucosa.  No lesions or ulceration noted.  The dentition " are in good repair.  The tongue is mobile and midline.  Throat - The walls of the oropharynx were smooth, pink, moist, symmetric, and had no lesions or ulcerations.  The tonsillar pillars and soft palate were symmetric.  The uvula was midline on elevation.      ASSESSMENT:    ICD-10-CM    1. Perforation of tympanic membrane, left H72.92    2. Sensorineural hearing loss (SNHL) of both ears H90.3    3. 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 History of myringotomy Z98.890    5. TMJ (temporomandibular joint syndrome) M26.609            Ears are stable at this time. Keep ears dry  If otalgia, TMJ concerns do not improve with TMJ precautions. Consider CT of TB, mastoid, and middle ear.   Reviewed options of watchful waiting vs. Possible tympanoplasty.  Declined procedure.  Keep left ear dry  Monitor left ear.   RTC in 6 months. Return sooner    TMJ information reviewed with patient.   Start exercises. Consider PT.      Radhika Barron PA-C  ENT  [facility], [place]

## 2018-12-12 NOTE — NURSING NOTE
"Chief Complaint   Patient presents with     Follow Up     left TM perf, ETD jeff, SNHL jeff       Initial /80 (BP Location: Left arm, Patient Position: Sitting, Cuff Size: Adult Large)   Pulse 74   Temp 98.3  F (36.8  C) (Tympanic)   Ht 1.626 m (5' 4\")   Wt 115.3 kg (254 lb 3.1 oz)   SpO2 98%   BMI 43.63 kg/m   Estimated body mass index is 43.63 kg/m  as calculated from the following:    Height as of this encounter: 1.626 m (5' 4\").    Weight as of this encounter: 115.3 kg (254 lb 3.1 oz).  Medication Reconciliation: complete    Selene Estrada LPN  "

## 2018-12-12 NOTE — LETTER
12/12/2018         RE: Elizabeth Rankin  [address]  [facility] [place] [address]        Dear Colleague,    Thank you for referring your patient, Elizabeth Rankin, to the [facility] [place]. Please see a copy of my visit note below.    Chief Complaint   Patient presents with     Follow Up     left TM perf, ETD jeff, SNHL jeff   patient returns for recheck of Left TM perforation.   No concerns.   She does have intermittent moisture in her left canal. Denies bloody, foul smelling drainage.   Fern has noticed an increase in clenching, fractured a tooth. She has seen DDS and recommend f/u.   She has felt jaw popping at times, right. No recent PT. Defers oral surgery.     She has a hx of BTTI with Dr. Ruelas on 9/30/16. She was seen S/p BTT by MARICHUY, NP on 10/31/16 with normal postop exam.   She was last seen by myself on 2/15/18; right tube was extruding and left TM perforation. Since, she has noted pulsatile tinnitus resolved. Her hearing remains decreased on left. Right ear mild fullness intermittently.      She denies active otorrhea, but does feel left ear has moisture at times.   Bilateral hearing aids with good success.   Denies otalgia.   Audiogram from 8/2017 reviewed with patient.   No flux HL or vertigo  Reviewed audiogram with patient today. Right ear stable, TYpe A tympanogram. Left ear with mixed loss, typ B large volume, c/w perforation.        Past Medical History:   Diagnosis Date     Bicuspid aortic valve      Bicuspid aortic valve      Depression      DM type 2 (diabetes mellitus, type 2) (H)      Hyperlipidemia      Nondependent alcohol abuse, unspecified drinking b 1/2/2001        Allergies   Allergen Reactions     Dulaglutide Other (See Comments)     confusion     Erythromycin Hives     Able to tolerate Zithromax:  Allergy     Januvia [Sitagliptin]      Joint pain, nausea     Meloxicam Swelling     Swelling of lower extremeties; chest pain     Oxycodone      Suppressed respirations      "Percocet [Oxycodone-Acetaminophen] Itching     Ritalin [Methylphenidate] Other (See Comments)     Over stimulation     Ritalin [Methylphenidate]      Tramadol      sedation     Tylenol With Codeine [Acetaminophen-Codeine] Nausea     Vistaril [Hydroxyzine]      Dizzy, ankle swelling       Vortioxetine Nausea     Headache, neck pain     Welchol [Colesevelam]      Joint pain, muscle weakness     Strattera [Atomoxetine] Anxiety     Valium [Diazepam] Rash     Current Outpatient Medications   Medication     adalimumab (HUMIRA) 40 MG/0.8ML prefilled syringe kit     blood glucose monitoring (ACCU-CHEK SMARTVIEW) test strip     canaliflozin (INVOKANA) tablet     Celecoxib (CELEBREX PO)     insulin pen needle 32G X 4 MM     metFORMIN (GLUCOPHAGE) 1000 MG tablet     modafinil (PROVIGIL) 100 MG tablet     No current facility-administered medications for this visit.       ROS: 10 point ROS neg other than the symptoms noted above in the HPI.  /80 (BP Location: Left arm, Patient Position: Sitting, Cuff Size: Adult Large)   Pulse 74   Temp 98.3  F (36.8  C) (Tympanic)   Ht 1.626 m (5' 4\")   Wt 115.3 kg (254 lb 3.1 oz)   SpO2 98%   BMI 43.63 kg/m     General - The patient is well nourished and well developed, and appears to have good nutritional status.  Alert and oriented to person and place, interactive.  Head and Face - Normocephalic and atraumatic, with no gross asymmetry noted of the contour of the facial features.  The facial nerve is intact, with strong symmetric movements.  Neck-no palpable lymphadenopathy or thyroid mass.  Trachea is midline.  Eyes - Extraocular movements intact.   Ears- External auditory canals are patent, tympanic membranes- Left canal clear. Left TM appears with perforation, 20% Anterior inferior quadrant. Middle ear appears healthy.   Right TM intact without effusion or retraction.   Nose - Nasal mucosa is pink and moist with no abnormal mucus.  The septum was grossly midline aside from left " septal spur with contact  turbinates enlarged with right jasen  No polyps, masses, or purulence noted on examination.   Mouth - Examination of the oral cavity shows pink, healthy, moist mucosa.  No lesions or ulceration noted.  The dentition are in good repair.  The tongue is mobile and midline.  Throat - The walls of the oropharynx were smooth, pink, moist, symmetric, and had no lesions or ulcerations.  The tonsillar pillars and soft palate were symmetric.  The uvula was midline on elevation.      ASSESSMENT:    ICD-10-CM    1. Perforation of tympanic membrane, left H72.92    2. Sensorineural hearing loss (SNHL) of both ears H90.3    3. 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 History of myringotomy Z98.890    5. TMJ (temporomandibular joint syndrome) M26.609            Ears are stable at this time. Keep ears dry  If otalgia, TMJ concerns do not improve with TMJ precautions. Consider CT of TB, mastoid, and middle ear.   Reviewed options of watchful waiting vs. Possible tympanoplasty.  Declined procedure.  Keep left ear dry  Monitor left ear.   RTC in 6 months. Return sooner    TMJ information reviewed with patient.   Start exercises. Consider PT.      Radhika Barron PA-C  ENT  [facility], [place]    Again, thank you for allowing me to participate in the care of your patient.        Sincerely,        Radhika Barron PA-C

## 2019-03-06 ENCOUNTER — ALLIED HEALTH/NURSE VISIT (OUTPATIENT)
Dept: AUDIOLOGY | Facility: OTHER | Age: 54
End: 2019-03-06
Attending: AUDIOLOGIST
Payer: MEDICARE

## 2019-03-06 DIAGNOSIS — H90.3 SENSORINEURAL HEARING LOSS (SNHL) OF BOTH EARS: Primary | ICD-10-CM

## 2019-03-06 PROCEDURE — V5266 BATTERY FOR HEARING DEVICE: HCPCS

## 2019-03-29 ENCOUNTER — ANCILLARY PROCEDURE (OUTPATIENT)
Dept: MAMMOGRAPHY | Facility: OTHER | Age: 54
End: 2019-03-29
Attending: FAMILY MEDICINE
Payer: MEDICARE

## 2019-03-29 DIAGNOSIS — Z12.39 SCREENING BREAST EXAMINATION: ICD-10-CM

## 2019-03-29 PROCEDURE — 77063 BREAST TOMOSYNTHESIS BI: CPT | Mod: TC

## 2019-04-17 ENCOUNTER — HOSPITAL ENCOUNTER (OUTPATIENT)
Dept: GENERAL RADIOLOGY | Facility: HOSPITAL | Age: 54
Discharge: HOME | End: 2019-04-17
Attending: FAMILY MEDICINE | Admitting: FAMILY MEDICINE
Payer: MEDICARE

## 2019-04-17 DIAGNOSIS — M25.552 LEFT HIP PAIN: ICD-10-CM

## 2019-04-17 PROCEDURE — 20610 DRAIN/INJ JOINT/BURSA W/O US: CPT | Mod: TC,LT

## 2019-04-17 PROCEDURE — 25500064 ZZH RX 255 OP 636: Performed by: RADIOLOGY

## 2019-04-17 PROCEDURE — 25000125 ZZHC RX 250: Performed by: RADIOLOGY

## 2019-04-17 RX ORDER — LIDOCAINE HYDROCHLORIDE 10 MG/ML
5 INJECTION, SOLUTION EPIDURAL; INFILTRATION; INTRACAUDAL; PERINEURAL ONCE
Status: COMPLETED | OUTPATIENT
Start: 2019-04-17 | End: 2019-04-17

## 2019-04-17 RX ORDER — METHYLPREDNISOLONE ACETATE 80 MG/ML
INJECTION, SUSPENSION INTRA-ARTICULAR; INTRALESIONAL; INTRAMUSCULAR; SOFT TISSUE
Status: DISCONTINUED
Start: 2019-04-17 | End: 2019-04-18 | Stop reason: HOSPADM

## 2019-04-17 RX ORDER — IOPAMIDOL 612 MG/ML
50 INJECTION, SOLUTION INTRAVASCULAR ONCE
Status: COMPLETED | OUTPATIENT
Start: 2019-04-17 | End: 2019-04-17

## 2019-04-17 RX ORDER — LIDOCAINE HYDROCHLORIDE 10 MG/ML
INJECTION, SOLUTION EPIDURAL; INFILTRATION; INTRACAUDAL; PERINEURAL
Status: DISCONTINUED
Start: 2019-04-17 | End: 2019-04-18 | Stop reason: HOSPADM

## 2019-04-17 RX ORDER — METHYLPREDNISOLONE ACETATE 80 MG/ML
80 INJECTION, SUSPENSION INTRA-ARTICULAR; INTRALESIONAL; INTRAMUSCULAR; SOFT TISSUE ONCE
Status: COMPLETED | OUTPATIENT
Start: 2019-04-17 | End: 2019-04-17

## 2019-04-17 RX ADMIN — IOPAMIDOL 2 ML: 612 INJECTION, SOLUTION INTRAVENOUS at 13:22

## 2019-04-17 RX ADMIN — METHYLPREDNISOLONE ACETATE 80 MG: 80 INJECTION, SUSPENSION INTRA-ARTICULAR; INTRALESIONAL; INTRAMUSCULAR; SOFT TISSUE at 13:20

## 2019-04-17 RX ADMIN — LIDOCAINE HYDROCHLORIDE 4 ML: 10 INJECTION, SOLUTION EPIDURAL; INFILTRATION; INTRACAUDAL at 13:22

## 2019-04-17 NOTE — DISCHARGE INSTRUCTIONS
Home number on file [phone] (home)  Is it ok to leave a message at this number(s)? Yes    Dr. Jimenez completed your procedure on 4/17/2019.    Current Pain Level (0-10 Scale): 4/10  Post Pain Level (0-10):  4/10    Radiology Discharge instructions for Steroid Injection    Activity Level:     Do not do any heavy activity or exercise for 24 hours.   Do not drive for 4 hours after your injection.  Diet:   Return to your normal diet.  Medications:   If you have stopped taking your Aspirin, Coumadin/Warfarin, Ibuprofen, or any   other blood thinner for this procedure you may resume in the morning unless   your primary care provider has given you other instructions.    Diabetics may see an increase in blood sugar after steroid injections. If you are concerned about your blood sugar, please contact your family doctor.    Site Care:  Remove the bandage and bathe or shower the morning after the procedure.      Please allow two weeks to experience improvement in your pain.  If you have any further issues, please contact your provider.    Call your Primary Care Provider if you have the following (if your primary care provider is not available please seek emergency care):   Nausea with vomiting   Severe headache   Drowsiness or confusion   Redness or drainage at the injection or puncture site   Temperature over 101 degrees F   Other concerns   Worsening back pain   Stiff neck

## 2019-04-17 NOTE — IP AVS SNAPSHOT
[address] [address]  Phone:  [phone]                                    After Visit Summary   4/17/2019    Elizabeth Rankin    MRN: [id number]           After Visit Summary Signature Page    I have received my discharge instructions, and my questions have been answered. I have discussed any challenges I see with this plan with the nurse or doctor.    ..........................................................................................................................................  Patient/Patient Representative Signature      ..........................................................................................................................................  Patient Representative Print Name and Relationship to Patient    ..................................................               ................................................  Date                                   Time    ..........................................................................................................................................  Reviewed by Signature/Title    ...................................................              ..............................................  Date                                               Time          22EPIC Rev 08/18

## 2019-04-24 ENCOUNTER — TRANSFERRED RECORDS (OUTPATIENT)
Dept: HEALTH INFORMATION MANAGEMENT | Facility: HOSPITAL | Age: 54
End: 2019-04-24

## 2019-06-10 ENCOUNTER — ALLIED HEALTH/NURSE VISIT (OUTPATIENT)
Dept: AUDIOLOGY | Facility: OTHER | Age: 54
End: 2019-06-10
Attending: AUDIOLOGIST
Payer: MEDICARE

## 2019-06-10 ENCOUNTER — TRANSFERRED RECORDS (OUTPATIENT)
Dept: HEALTH INFORMATION MANAGEMENT | Facility: HOSPITAL | Age: 54
End: 2019-06-10

## 2019-06-10 DIAGNOSIS — H90.3 SENSORINEURAL HEARING LOSS (SNHL) OF BOTH EARS: Primary | ICD-10-CM

## 2019-06-10 LAB
ALT SERPL-CCNC: 21 U/L (ref 18–65)
AST SERPL-CCNC: 23 U/L (ref 10–30)
CREAT SERPL-MCNC: 0.53 MG/DL (ref 0.7–1.2)
GFR SERPL CREATININE-BSD FRML MDRD: >60 ML/MIN/1.73M2
GLUCOSE SERPL-MCNC: 93 MG/DL (ref 60–99)
POTASSIUM SERPL-SCNC: 4.1 MEQ/L (ref 3.5–5.1)

## 2019-06-10 PROCEDURE — V5266 BATTERY FOR HEARING DEVICE: HCPCS

## 2019-06-10 NOTE — PROGRESS NOTES
Per guidelines of patient's insurance, 32 units of size 312 batteries were dispensed today, Battery Modifier: NU (New). Reviewed that patent is eligible for batteries once every 90 days, and how they can request new batteries.

## 2019-06-11 RX ORDER — CHOLECALCIFEROL (VITAMIN D3) 1250 MCG
50000 CAPSULE ORAL
Status: ON HOLD | COMMUNITY
End: 2019-08-05

## 2019-06-11 RX ORDER — MEDROXYPROGESTERONE ACETATE 10 MG
10 TABLET ORAL DAILY
COMMUNITY

## 2019-06-13 ENCOUNTER — OFFICE VISIT (OUTPATIENT)
Dept: OTOLARYNGOLOGY | Facility: OTHER | Age: 54
End: 2019-06-13
Attending: PHYSICIAN ASSISTANT
Payer: MEDICARE

## 2019-06-13 VITALS
SYSTOLIC BLOOD PRESSURE: 130 MMHG | DIASTOLIC BLOOD PRESSURE: 74 MMHG | HEART RATE: 80 BPM | OXYGEN SATURATION: 98 % | WEIGHT: 230 LBS | BODY MASS INDEX: 39.27 KG/M2 | TEMPERATURE: 98.7 F | HEIGHT: 64 IN

## 2019-06-13 DIAGNOSIS — H90.3 SENSORINEURAL HEARING LOSS (SNHL) OF BOTH EARS: ICD-10-CM

## 2019-06-13 DIAGNOSIS — H72.92 PERFORATION OF TYMPANIC MEMBRANE, LEFT: Primary | ICD-10-CM

## 2019-06-13 DIAGNOSIS — Z98.890 HISTORY OF MYRINGOTOMY: ICD-10-CM

## 2019-06-13 DIAGNOSIS — H90.A32 MIXED CONDUCTIVE AND SENSORINEURAL HEARING LOSS OF LEFT EAR WITH RESTRICTED HEARING OF RIGHT EAR: ICD-10-CM

## 2019-06-13 PROCEDURE — 99213 OFFICE O/P EST LOW 20 MIN: CPT | Performed by: PHYSICIAN ASSISTANT

## 2019-06-13 PROCEDURE — G0463 HOSPITAL OUTPT CLINIC VISIT: HCPCS

## 2019-06-13 ASSESSMENT — MIFFLIN-ST. JEOR: SCORE: 1638.27

## 2019-06-13 ASSESSMENT — PAIN SCALES - GENERAL: PAINLEVEL: NO PAIN (0)

## 2019-06-13 NOTE — PATIENT INSTRUCTIONS
Ears look well.   Stable perforation. Consider tympanoplasty     Keep LEFT ear dry   Follow up in August with Ursula for hearing aid check/ audio  Recheck ears in 6 months.     Thank you for allowing Radhika Barron PA-C and our ENT team to participate in your care.  If your medications are too expensive, please give the nurse a call.  We can possibly change this medication.  If you have a scheduling or an appointment question please contact our Health Unit Coordinator at their direct line [phone].   ALL nursing questions or concerns can be directed to your ENT nurse at: [phone] Nicole

## 2019-06-13 NOTE — NURSING NOTE
"Chief Complaint   Patient presents with     Follow Up     Left TM perf,  Bilateral  SNHL, Mixed conductive and SNHL       Initial /74   Pulse 80   Temp 98.7  F (37.1  C) (Tympanic)   Ht 1.626 m (5' 4\")   Wt 104.3 kg (230 lb)   SpO2 98%   BMI 39.48 kg/m   Estimated body mass index is 39.48 kg/m  as calculated from the following:    Height as of this encounter: 1.626 m (5' 4\").    Weight as of this encounter: 104.3 kg (230 lb).  Medication Reconciliation: complete      "

## 2019-06-13 NOTE — PROGRESS NOTES
Chief Complaint   Patient presents with     Follow Up     Left TM perf,  Bilateral  SNHL, Mixed conductive and SNHL     Fern returns for recheck of her left ear. She has some wax build up in her left ear and moisture at time. Reports when she does do this It helps the ear.     Denies bloody, foul smelling drainage.   Fern has noticed an increase in clenching, fractured a tooth. She has seen DDS and recommend f/u.   She has felt jaw popping at times, right. No recent PT. Defers oral surgery.      She has a hx of BTTI with Dr. Ruelas on 9/30/16. She was seen S/p BTT by MARICHUY, NP on 10/31/16 with normal postop exam.   She was last seen by myself on 2/15/18; right tube was extruding and left TM perforation. Since, she has noted pulsatile tinnitus resolved. Her hearing remains decreased on left. Right ear mild fullness intermittently.      She denies active otorrhea, but does feel left ear has moisture at times.   Bilateral hearing aids with good success.   Denies otalgia.   Audiogram from 8/2018 reviewed with patient.   No flux HL or vertigo  Reviewed audiogram with patient today. Right ear stable, TYpe A tympanogram. Left ear with mixed loss, typ B large volume, c/w perforation.        Past Medical History:   Diagnosis Date     Bicuspid aortic valve      Bicuspid aortic valve      Depression      DM type 2 (diabetes mellitus, type 2) (H)      Hyperlipidemia      Nondependent alcohol abuse, unspecified drinking b 1/2/2001        Allergies   Allergen Reactions     Dulaglutide Other (See Comments)     confusion     Erythromycin Hives     Able to tolerate Zithromax:  Allergy     Januvia [Sitagliptin]      Joint pain, nausea     Meloxicam Swelling     Swelling of lower extremeties; chest pain     Oxycodone      Suppressed respirations     Percocet [Oxycodone-Acetaminophen] Itching     Ritalin [Methylphenidate] Other (See Comments)     Over stimulation     Ritalin [Methylphenidate]      Tramadol      sedation      "Tylenol With Codeine [Acetaminophen-Codeine] Nausea     Vistaril [Hydroxyzine]      Dizzy, ankle swelling       Vortioxetine Nausea     Headache, neck pain     Welchol [Colesevelam]      Joint pain, muscle weakness     Strattera [Atomoxetine] Anxiety     Valium [Diazepam] Rash     Current Outpatient Medications   Medication     adalimumab (HUMIRA) 40 MG/0.8ML prefilled syringe kit     blood glucose monitoring (ACCU-CHEK SMARTVIEW) test strip     canaliflozin (INVOKANA) tablet     Celecoxib (CELEBREX PO)     cholecalciferol (VITAMIN D3) 69632 units (1250 mcg) capsule     insulin pen needle 32G X 4 MM     medroxyPROGESTERone (PROVERA) 10 MG tablet     metFORMIN (GLUCOPHAGE) 1000 MG tablet     modafinil (PROVIGIL) 100 MG tablet     vitamin B-Complex     No current facility-administered medications for this visit.       ROS: 10 point ROS neg other than the symptoms noted above in the HPI.  /74   Pulse 80   Temp 98.7  F (37.1  C) (Tympanic)   Ht 1.626 m (5' 4\")   Wt 104.3 kg (230 lb)   SpO2 98%   BMI 39.48 kg/m       General - The patient is well nourished and well developed, and appears to have good nutritional status.  Alert and oriented to person and place, interactive.  Head and Face - Normocephalic and atraumatic, with no gross asymmetry noted of the contour of the facial features.  The facial nerve is intact, with strong symmetric movements.  Neck-no palpable lymphadenopathy or thyroid mass.  Trachea is midline.  Eyes - Extraocular movements intact.   Ears- External auditory canals are patent, tympanic membranes- Left canal clear. Left TM appears with perforation, 20% Anterior inferior quadrant. Middle ear appears healthy.   Right TM intact without effusion or retraction.   Nose - Nasal mucosa is pink and moist with no abnormal mucus.  The septum was grossly midline aside from left septal spur with contact  turbinates enlarged with right jasen  No polyps, masses, or purulence noted on examination. "   Mouth - Examination of the oral cavity shows pink, healthy, moist mucosa.  No lesions or ulceration noted.  The dentition are in good repair.  The tongue is mobile and midline.  Throat - The walls of the oropharynx were smooth, pink, moist, symmetric, and had no lesions or ulcerations.  The tonsillar pillars and soft palate were symmetric.  The uvula was midline on elevation.           ASSESSMENT:    ICD-10-CM    1. Perforation of tympanic membrane, left H72.92    2. Sensorineural hearing loss (SNHL) of both ears H90.3    3. 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 History of myringotomy Z98.890          Ears are stable at this time. Keep ears dry      Reviewed options of watchful waiting vs. Possible tympanoplasty.  Declined procedure.  Keep left ear dry  Monitor left ear.   RTC in 6 months. Return sooner     TMJ information reviewed with patient.        Radhika Barron PA-C  ENT  [facility]

## 2019-06-13 NOTE — LETTER
6/13/2019         RE: Elizabeth Rankin  [address]  [facility] [address]        Dear Colleague,    Thank you for referring your patient, Elizabeth Rankin, to the [facility]. Please see a copy of my visit note below.    Chief Complaint   Patient presents with     Follow Up     Left TM perf,  Bilateral  SNHL, Mixed conductive and SNHL     Fern returns for recheck of her left ear. She has some wax build up in her left ear and moisture at time. Reports when she does do this It helps the ear.     Denies bloody, foul smelling drainage.   Fern has noticed an increase in clenching, fractured a tooth. She has seen DDS and recommend f/u.   She has felt jaw popping at times, right. No recent PT. Defers oral surgery.      She has a hx of BTTI with Dr. Ruelas on 9/30/16. She was seen S/p BTT by MARICHUY, NP on 10/31/16 with normal postop exam.   She was last seen by myself on 2/15/18; right tube was extruding and left TM perforation. Since, she has noted pulsatile tinnitus resolved. Her hearing remains decreased on left. Right ear mild fullness intermittently.      She denies active otorrhea, but does feel left ear has moisture at times.   Bilateral hearing aids with good success.   Denies otalgia.   Audiogram from 8/2018 reviewed with patient.   No flux HL or vertigo  Reviewed audiogram with patient today. Right ear stable, TYpe A tympanogram. Left ear with mixed loss, typ B large volume, c/w perforation.        Past Medical History:   Diagnosis Date     Bicuspid aortic valve      Bicuspid aortic valve      Depression      DM type 2 (diabetes mellitus, type 2) (H)      Hyperlipidemia      Nondependent alcohol abuse, unspecified drinking b 1/2/2001        Allergies   Allergen Reactions     Dulaglutide Other (See Comments)     confusion     Erythromycin Hives     Able to tolerate Zithromax:  Allergy     Januvia [Sitagliptin]      Joint pain, nausea     Meloxicam Swelling     Swelling of lower  "extremeties; chest pain     Oxycodone      Suppressed respirations     Percocet [Oxycodone-Acetaminophen] Itching     Ritalin [Methylphenidate] Other (See Comments)     Over stimulation     Ritalin [Methylphenidate]      Tramadol      sedation     Tylenol With Codeine [Acetaminophen-Codeine] Nausea     Vistaril [Hydroxyzine]      Dizzy, ankle swelling       Vortioxetine Nausea     Headache, neck pain     Welchol [Colesevelam]      Joint pain, muscle weakness     Strattera [Atomoxetine] Anxiety     Valium [Diazepam] Rash     Current Outpatient Medications   Medication     adalimumab (HUMIRA) 40 MG/0.8ML prefilled syringe kit     blood glucose monitoring (ACCU-CHEK SMARTVIEW) test strip     canaliflozin (INVOKANA) tablet     Celecoxib (CELEBREX PO)     cholecalciferol (VITAMIN D3) 35312 units (1250 mcg) capsule     insulin pen needle 32G X 4 MM     medroxyPROGESTERone (PROVERA) 10 MG tablet     metFORMIN (GLUCOPHAGE) 1000 MG tablet     modafinil (PROVIGIL) 100 MG tablet     vitamin B-Complex     No current facility-administered medications for this visit.       ROS: 10 point ROS neg other than the symptoms noted above in the HPI.  /74   Pulse 80   Temp 98.7  F (37.1  C) (Tympanic)   Ht 1.626 m (5' 4\")   Wt 104.3 kg (230 lb)   SpO2 98%   BMI 39.48 kg/m        General - The patient is well nourished and well developed, and appears to have good nutritional status.  Alert and oriented to person and place, interactive.  Head and Face - Normocephalic and atraumatic, with no gross asymmetry noted of the contour of the facial features.  The facial nerve is intact, with strong symmetric movements.  Neck-no palpable lymphadenopathy or thyroid mass.  Trachea is midline.  Eyes - Extraocular movements intact.   Ears- External auditory canals are patent, tympanic membranes- Left canal clear. Left TM appears with perforation, 20% Anterior inferior quadrant. Middle ear appears healthy.   Right TM intact without effusion " or retraction.   Nose - Nasal mucosa is pink and moist with no abnormal mucus.  The septum was grossly midline aside from left septal spur with contact  turbinates enlarged with right jasen  No polyps, masses, or purulence noted on examination.   Mouth - Examination of the oral cavity shows pink, healthy, moist mucosa.  No lesions or ulceration noted.  The dentition are in good repair.  The tongue is mobile and midline.  Throat - The walls of the oropharynx were smooth, pink, moist, symmetric, and had no lesions or ulcerations.  The tonsillar pillars and soft palate were symmetric.  The uvula was midline on elevation.           ASSESSMENT:    ICD-10-CM    1. Perforation of tympanic membrane, left H72.92    2. Sensorineural hearing loss (SNHL) of both ears H90.3    3. 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 History of myringotomy Z98.890          Ears are stable at this time. Keep ears dry      Reviewed options of watchful waiting vs. Possible tympanoplasty.  Declined procedure.  Keep left ear dry  Monitor left ear.   RTC in 6 months. Return sooner     TMJ information reviewed with patient.        Radhika Barron PA-C  ENT  [facility], [place]      Again, thank you for allowing me to participate in the care of your patient.        Sincerely,        Radhika Barron PA-C

## 2019-06-18 NOTE — OR NURSING
RE:  Elizabeth Rankin  [birth year] is scheduled for direct anterior left total hip arthroplasty with Dr. Miranda on 6/25/19.  PCP is Dr. Oma Osorio.  Elizabeth lives north of [place] with her 3 dogs.  Her daughter Sonu helped her with her first hip surgery she had done in [place].  She lives in a one level house with no steps to get into; is going to put the twin bed in living room so she is close to the bathroom.  The bathroom has a tub/shower and she bought a suction cup grab bar.  She does not have a walker( says she never used one with first surgery, never understood it so just walked without it)-borrowed from friend with first hip surgery, not an option.  She does not have a high rise toilet seat, reacher stick or sock helper.  She said she wants to go to the nursing home for rehab but attended the total joint class and will plan to go home.  Asked her about home P. T. and states she wouldn t need it, they have therapy in [place] and with her 3 dogs it would be better.  States she is ultra sensitive to the side effects of medications.  Very anxious because she did not have good pain control with her first hip and has a fear of pain.  Went home on liquid morphine and it did not help, states she was mean to her daughter who was a teenager at the time and cried a lot.  She has DM2, bicuspid aortic valve, hx right PATRICK, former smoker, BMI-39.48.   Hgb A1C = 6.1.      Reviewed the following topics with;  Call don t Fall , CAUTI education, Capnography monitoring, and Signs & Symptoms of Infections to watch/report and prevent.  She verbalized good understanding of all topics discussed.    Thank you,  Cielo Valenzuela R.N.  Pre-Anesthesia Testing Surgical Education  [place] [address] [place]  [address]  [email or web address]    Office: [phone]  Fax [phone]

## 2019-06-19 ENCOUNTER — ANESTHESIA EVENT (OUTPATIENT)
Dept: SURGERY | Facility: HOSPITAL | Age: 54
DRG: 470 | End: 2019-06-19
Payer: MEDICARE

## 2019-06-19 ASSESSMENT — LIFESTYLE VARIABLES: TOBACCO_USE: 1

## 2019-06-19 NOTE — ANESTHESIA PREPROCEDURE EVALUATION
Anesthesia Pre-Procedure Evaluation    Patient: Elizabeth Raknin   MRN: [id number] : 1965          Preoperative Diagnosis: DJD LEFT HIP    Procedure(s):  DIRECT ANTERIOR LEFT TOTAL HIP ARTHROPLASTY    Past Medical History:   Diagnosis Date     Bicuspid aortic valve      Bicuspid aortic valve      Depression      DM type 2 (diabetes mellitus, type 2) (H)      Hyperlipidemia      Nondependent alcohol abuse, unspecified drinking b 2001     PONV (postoperative nausea and vomiting)      Past Surgical History:   Procedure Laterality Date     BIOPSY Right 10/30/2013    right breast biopsy; fibroadenoma     ORTHOPEDIC SURGERY  2014    Right total hip Dr. Meño Sullivan      ORTHOPEDIC SURGERY Right 2015    Right hip psoas release     ORTHOPEDIC SURGERY Left 2015    left hip labrum debridement Dr. Meño Sullivan      TUBAL LIGATION         Anesthesia Evaluation     . Pt has had prior anesthetic.     History of anesthetic complications   - PONV        ROS/MED HX    ENT/Pulmonary:     (+)CACHORRO risk factors (BMI: 39.48) obese, tobacco use, Past use , . .    Neurologic:  - neg neurologic ROS     Cardiovascular:     (+) Dyslipidemia, ----. : . . . :. valvular problems/murmurs bicuspid aortic valve:. Previous cardiac testing date:results:date: results:ECG reviewed date:6/10/19 results:sr date: results:          METS/Exercise Tolerance:     Hematologic:  - neg hematologic  ROS       Musculoskeletal:   (+) arthritis,  other musculoskeletal- lumbar spondylosis      GI/Hepatic:     (+) Other GI/Hepatic etoh      Renal/Genitourinary:         Endo:     (+) type II DM Obesity, .      Psychiatric:     (+) psychiatric history depression      Infectious Disease:  - neg infectious disease ROS       Malignancy:      - no malignancy   Other:    (+) No chance of pregnancy   - neg other ROS                      Physical Exam  Normal systems: dental    Airway   Mallampati: II  TM distance: >3 FB  Neck ROM: full    Dental  "    Cardiovascular   Rhythm and rate: regular and normal      Pulmonary    breath sounds clear to auscultation            Lab Results   Component Value Date    WBC 7.8 09/10/2014    HGB 10.4 (L) 09/10/2014    HCT 33.0 (L) 09/10/2014     09/10/2014    POTASSIUM 4.1 06/10/2019    CR 0.53 (L) 06/10/2019    GLC 93 06/10/2019    ALT 21 06/10/2019    AST 23 06/10/2019    INR 2.0 (H) 07/31/2014       Preop Vitals  BP Readings from Last 3 Encounters:   06/13/19 130/74   12/12/18 134/80   06/12/18 130/80    Pulse Readings from Last 3 Encounters:   06/13/19 80   12/12/18 74   06/12/18 78      Resp Readings from Last 3 Encounters:   04/09/18 20   09/30/16 20   07/27/16 20    SpO2 Readings from Last 3 Encounters:   06/13/19 98%   12/12/18 98%   06/12/18 97%      Temp Readings from Last 1 Encounters:   06/13/19 98.7  F (37.1  C) (Tympanic)    Ht Readings from Last 1 Encounters:   06/13/19 1.626 m (5' 4\")      Wt Readings from Last 1 Encounters:   06/13/19 104.3 kg (230 lb)    Estimated body mass index is 39.48 kg/m  as calculated from the following:    Height as of 6/13/19: 1.626 m (5' 4\").    Weight as of 6/13/19: 104.3 kg (230 lb).       Anesthesia Plan      History & Physical Review  History and physical reviewed and following examination; no interval change.    ASA Status:  3 .    NPO Status:  > 8 hours    Plan for Spinal with Intravenous induction. Maintenance will be TIVA.    PONV prophylaxis:  Ondansetron (or other 5HT-3), Dexamethasone or Solumedrol and Scopolamine patch  HCG: Negative      Postoperative Care  Postoperative pain management:  IV analgesics and Oral pain medications.      Consents  Anesthetic plan, risks, benefits and alternatives discussed with:  Patient..                 Rey Alvarez, LAUREN CRNA  "

## 2019-06-25 ENCOUNTER — ANESTHESIA (OUTPATIENT)
Dept: SURGERY | Facility: HOSPITAL | Age: 54
DRG: 470 | End: 2019-06-25
Payer: MEDICARE

## 2019-06-25 ENCOUNTER — HOSPITAL ENCOUNTER (INPATIENT)
Facility: HOSPITAL | Age: 54
LOS: 2 days | Discharge: HOME | DRG: 470 | End: 2019-06-27
Attending: ORTHOPAEDIC SURGERY | Admitting: ORTHOPAEDIC SURGERY
Payer: MEDICARE

## 2019-06-25 ENCOUNTER — APPOINTMENT (OUTPATIENT)
Dept: GENERAL RADIOLOGY | Facility: HOSPITAL | Age: 54
DRG: 470 | End: 2019-06-25
Attending: ORTHOPAEDIC SURGERY
Payer: MEDICARE

## 2019-06-25 DIAGNOSIS — Z96.641 STATUS POST RIGHT HIP REPLACEMENT: Primary | ICD-10-CM

## 2019-06-25 LAB
GLUCOSE BLDC GLUCOMTR-MCNC: 134 MG/DL (ref 70–99)
GLUCOSE BLDC GLUCOMTR-MCNC: 143 MG/DL (ref 70–99)
HCG UR QL: NEGATIVE

## 2019-06-25 PROCEDURE — 25000132 ZZH RX MED GY IP 250 OP 250 PS 637: Mod: GY | Performed by: ORTHOPAEDIC SURGERY

## 2019-06-25 PROCEDURE — 36000063 ZZH SURGERY LEVEL 4 EA 15 ADDTL MIN: Performed by: ORTHOPAEDIC SURGERY

## 2019-06-25 PROCEDURE — 25000128 H RX IP 250 OP 636: Performed by: ORTHOPAEDIC SURGERY

## 2019-06-25 PROCEDURE — 27110028 ZZH OR GENERAL SUPPLY NON-STERILE: Performed by: ORTHOPAEDIC SURGERY

## 2019-06-25 PROCEDURE — 27130 TOTAL HIP ARTHROPLASTY: CPT | Performed by: NURSE ANESTHETIST, CERTIFIED REGISTERED

## 2019-06-25 PROCEDURE — 25000128 H RX IP 250 OP 636

## 2019-06-25 PROCEDURE — 72170 X-RAY EXAM OF PELVIS: CPT | Mod: TC

## 2019-06-25 PROCEDURE — 88300 SURGICAL PATH GROSS: CPT | Mod: TC | Performed by: ORTHOPAEDIC SURGERY

## 2019-06-25 PROCEDURE — 71000014 ZZH RECOVERY PHASE 1 LEVEL 2 FIRST HR: Performed by: ORTHOPAEDIC SURGERY

## 2019-06-25 PROCEDURE — 25800030 ZZH RX IP 258 OP 636: Performed by: ORTHOPAEDIC SURGERY

## 2019-06-25 PROCEDURE — 25000125 ZZHC RX 250: Performed by: ANESTHESIOLOGY

## 2019-06-25 PROCEDURE — 40000278 XR SURGERY CARM FLUORO LESS THAN 5 MIN: Mod: TC

## 2019-06-25 PROCEDURE — 00000146 ZZHCL STATISTIC GLUCOSE BY METER IP

## 2019-06-25 PROCEDURE — C1776 JOINT DEVICE (IMPLANTABLE): HCPCS | Performed by: ORTHOPAEDIC SURGERY

## 2019-06-25 PROCEDURE — 27210794 ZZH OR GENERAL SUPPLY STERILE: Performed by: ORTHOPAEDIC SURGERY

## 2019-06-25 PROCEDURE — 37000008 ZZH ANESTHESIA TECHNICAL FEE, 1ST 30 MIN: Performed by: ORTHOPAEDIC SURGERY

## 2019-06-25 PROCEDURE — 40000306 ZZH STATISTIC PRE PROC ASSESS II: Performed by: ORTHOPAEDIC SURGERY

## 2019-06-25 PROCEDURE — 81025 URINE PREGNANCY TEST: CPT | Performed by: NURSE ANESTHETIST, CERTIFIED REGISTERED

## 2019-06-25 PROCEDURE — 0SRB06A REPLACEMENT OF LEFT HIP JOINT WITH OXIDIZED ZIRCONIUM ON POLYETHYLENE SYNTHETIC SUBSTITUTE, UNCEMENTED, OPEN APPROACH: ICD-10-PCS | Performed by: ORTHOPAEDIC SURGERY

## 2019-06-25 PROCEDURE — 27130 TOTAL HIP ARTHROPLASTY: CPT | Performed by: ANESTHESIOLOGY

## 2019-06-25 PROCEDURE — 25800030 ZZH RX IP 258 OP 636

## 2019-06-25 PROCEDURE — 99232 SBSQ HOSP IP/OBS MODERATE 35: CPT | Performed by: HOSPITALIST

## 2019-06-25 PROCEDURE — 37000009 ZZH ANESTHESIA TECHNICAL FEE, EACH ADDTL 15 MIN: Performed by: ORTHOPAEDIC SURGERY

## 2019-06-25 PROCEDURE — 25800030 ZZH RX IP 258 OP 636: Performed by: ANESTHESIOLOGY

## 2019-06-25 PROCEDURE — 25000125 ZZHC RX 250: Performed by: ORTHOPAEDIC SURGERY

## 2019-06-25 PROCEDURE — 40000275 ZZH STATISTIC RCP TIME EA 10 MIN

## 2019-06-25 PROCEDURE — C9290 INJ, BUPIVACAINE LIPOSOME: HCPCS | Performed by: ORTHOPAEDIC SURGERY

## 2019-06-25 PROCEDURE — 12000000 ZZH R&B MED SURG/OB

## 2019-06-25 PROCEDURE — 25000125 ZZHC RX 250

## 2019-06-25 PROCEDURE — 36000065 ZZH SURGERY LEVEL 4 W FLUORO 1ST 30 MIN: Performed by: ORTHOPAEDIC SURGERY

## 2019-06-25 DEVICE — THREADED HOLE COVER-REFLECTION: Type: IMPLANTABLE DEVICE | Site: HIP | Status: FUNCTIONAL

## 2019-06-25 DEVICE — ACETABULAR LINER-R3 0 DEGREE XLPE 32MM X 50MM: Type: IMPLANTABLE DEVICE | Site: HIP | Status: FUNCTIONAL

## 2019-06-25 DEVICE — IMPLANTABLE DEVICE: Type: IMPLANTABLE DEVICE | Site: HIP | Status: FUNCTIONAL

## 2019-06-25 DEVICE — SCREW-SPHERICAL HEAD CANCELLOUS 25MM: Type: IMPLANTABLE DEVICE | Site: HIP | Status: FUNCTIONAL

## 2019-06-25 DEVICE — ACETABULAR SHELL-R3 3-HOLE 50MM: Type: IMPLANTABLE DEVICE | Site: HIP | Status: FUNCTIONAL

## 2019-06-25 RX ORDER — NICOTINE POLACRILEX 4 MG
15-30 LOZENGE BUCCAL
Status: DISCONTINUED | OUTPATIENT
Start: 2019-06-25 | End: 2019-06-27 | Stop reason: HOSPADM

## 2019-06-25 RX ORDER — DIPHENHYDRAMINE HYDROCHLORIDE 50 MG/ML
25 INJECTION INTRAMUSCULAR; INTRAVENOUS EVERY 6 HOURS PRN
Status: DISCONTINUED | OUTPATIENT
Start: 2019-06-25 | End: 2019-06-27 | Stop reason: HOSPADM

## 2019-06-25 RX ORDER — CELECOXIB 200 MG/1
400 CAPSULE ORAL 2 TIMES DAILY
Status: CANCELLED | OUTPATIENT
Start: 2019-06-25

## 2019-06-25 RX ORDER — LABETALOL 20 MG/4 ML (5 MG/ML) INTRAVENOUS SYRINGE
10
Status: DISCONTINUED | OUTPATIENT
Start: 2019-06-25 | End: 2019-06-25 | Stop reason: HOSPADM

## 2019-06-25 RX ORDER — BUPIVACAINE HYDROCHLORIDE AND EPINEPHRINE 2.5; 5 MG/ML; UG/ML
INJECTION, SOLUTION INFILTRATION; PERINEURAL PRN
Status: DISCONTINUED | OUTPATIENT
Start: 2019-06-25 | End: 2019-06-25 | Stop reason: HOSPADM

## 2019-06-25 RX ORDER — SODIUM CHLORIDE, SODIUM LACTATE, POTASSIUM CHLORIDE, CALCIUM CHLORIDE 600; 310; 30; 20 MG/100ML; MG/100ML; MG/100ML; MG/100ML
INJECTION, SOLUTION INTRAVENOUS CONTINUOUS
Status: DISCONTINUED | OUTPATIENT
Start: 2019-06-25 | End: 2019-06-25 | Stop reason: HOSPADM

## 2019-06-25 RX ORDER — BUPIVACAINE HYDROCHLORIDE 7.5 MG/ML
INJECTION, SOLUTION INTRASPINAL PRN
Status: DISCONTINUED | OUTPATIENT
Start: 2019-06-25 | End: 2019-06-25

## 2019-06-25 RX ORDER — LIDOCAINE 40 MG/G
CREAM TOPICAL
Status: DISCONTINUED | OUTPATIENT
Start: 2019-06-25 | End: 2019-06-27 | Stop reason: HOSPADM

## 2019-06-25 RX ORDER — LIDOCAINE HYDROCHLORIDE 20 MG/ML
INJECTION, SOLUTION INFILTRATION; PERINEURAL PRN
Status: DISCONTINUED | OUTPATIENT
Start: 2019-06-25 | End: 2019-06-25

## 2019-06-25 RX ORDER — SODIUM CHLORIDE 9 MG/ML
100 INJECTION, SOLUTION INTRAVENOUS CONTINUOUS
Status: DISCONTINUED | OUTPATIENT
Start: 2019-06-25 | End: 2019-06-27 | Stop reason: HOSPADM

## 2019-06-25 RX ORDER — NALOXONE HYDROCHLORIDE 0.4 MG/ML
.1-.4 INJECTION, SOLUTION INTRAMUSCULAR; INTRAVENOUS; SUBCUTANEOUS
Status: DISCONTINUED | OUTPATIENT
Start: 2019-06-25 | End: 2019-06-27 | Stop reason: HOSPADM

## 2019-06-25 RX ORDER — ACETAMINOPHEN 325 MG/1
975 TABLET ORAL EVERY 8 HOURS
Status: DISCONTINUED | OUTPATIENT
Start: 2019-06-25 | End: 2019-06-26

## 2019-06-25 RX ORDER — CEFAZOLIN SODIUM 2 G/100ML
2 INJECTION, SOLUTION INTRAVENOUS EVERY 8 HOURS
Status: COMPLETED | OUTPATIENT
Start: 2019-06-25 | End: 2019-06-26

## 2019-06-25 RX ORDER — AMOXICILLIN 250 MG
1 CAPSULE ORAL 2 TIMES DAILY
Status: DISCONTINUED | OUTPATIENT
Start: 2019-06-25 | End: 2019-06-27 | Stop reason: HOSPADM

## 2019-06-25 RX ORDER — HYDROMORPHONE HYDROCHLORIDE 1 MG/ML
.3-.5 INJECTION, SOLUTION INTRAMUSCULAR; INTRAVENOUS; SUBCUTANEOUS
Status: DISCONTINUED | OUTPATIENT
Start: 2019-06-25 | End: 2019-06-27 | Stop reason: HOSPADM

## 2019-06-25 RX ORDER — PHENYLEPHRINE HYDROCHLORIDE 10 MG/ML
INJECTION INTRAVENOUS PRN
Status: DISCONTINUED | OUTPATIENT
Start: 2019-06-25 | End: 2019-06-25

## 2019-06-25 RX ORDER — AMOXICILLIN 250 MG
2 CAPSULE ORAL 2 TIMES DAILY
Status: DISCONTINUED | OUTPATIENT
Start: 2019-06-25 | End: 2019-06-27 | Stop reason: HOSPADM

## 2019-06-25 RX ORDER — DIPHENHYDRAMINE HCL 25 MG
25 CAPSULE ORAL EVERY 6 HOURS PRN
Status: DISCONTINUED | OUTPATIENT
Start: 2019-06-25 | End: 2019-06-27 | Stop reason: HOSPADM

## 2019-06-25 RX ORDER — HYDRALAZINE HYDROCHLORIDE 20 MG/ML
2.5-5 INJECTION INTRAMUSCULAR; INTRAVENOUS EVERY 10 MIN PRN
Status: DISCONTINUED | OUTPATIENT
Start: 2019-06-25 | End: 2019-06-25 | Stop reason: HOSPADM

## 2019-06-25 RX ORDER — ONDANSETRON 2 MG/ML
4 INJECTION INTRAMUSCULAR; INTRAVENOUS EVERY 30 MIN PRN
Status: DISCONTINUED | OUTPATIENT
Start: 2019-06-25 | End: 2019-06-25 | Stop reason: HOSPADM

## 2019-06-25 RX ORDER — DEXAMETHASONE SODIUM PHOSPHATE 10 MG/ML
INJECTION, SOLUTION INTRAMUSCULAR; INTRAVENOUS PRN
Status: DISCONTINUED | OUTPATIENT
Start: 2019-06-25 | End: 2019-06-25

## 2019-06-25 RX ORDER — HYDROMORPHONE HYDROCHLORIDE 2 MG/1
4 TABLET ORAL
Status: DISCONTINUED | OUTPATIENT
Start: 2019-06-25 | End: 2019-06-26 | Stop reason: ALTCHOICE

## 2019-06-25 RX ORDER — BUPIVACAINE HYDROCHLORIDE AND EPINEPHRINE 2.5; 5 MG/ML; UG/ML
INJECTION, SOLUTION EPIDURAL; INFILTRATION; INTRACAUDAL; PERINEURAL
Status: DISCONTINUED
Start: 2019-06-25 | End: 2019-06-25 | Stop reason: HOSPADM

## 2019-06-25 RX ORDER — MEDROXYPROGESTERONE ACETATE 5 MG
10 TABLET ORAL DAILY
Status: DISCONTINUED | OUTPATIENT
Start: 2019-06-25 | End: 2019-06-27 | Stop reason: HOSPADM

## 2019-06-25 RX ORDER — LIDOCAINE 40 MG/G
CREAM TOPICAL
Status: DISCONTINUED | OUTPATIENT
Start: 2019-06-25 | End: 2019-06-25 | Stop reason: HOSPADM

## 2019-06-25 RX ORDER — ONDANSETRON 2 MG/ML
4 INJECTION INTRAMUSCULAR; INTRAVENOUS EVERY 6 HOURS PRN
Status: DISCONTINUED | OUTPATIENT
Start: 2019-06-25 | End: 2019-06-27 | Stop reason: HOSPADM

## 2019-06-25 RX ORDER — ONDANSETRON 2 MG/ML
INJECTION INTRAMUSCULAR; INTRAVENOUS PRN
Status: DISCONTINUED | OUTPATIENT
Start: 2019-06-25 | End: 2019-06-25

## 2019-06-25 RX ORDER — ONDANSETRON 4 MG/1
4 TABLET, ORALLY DISINTEGRATING ORAL EVERY 6 HOURS PRN
Status: DISCONTINUED | OUTPATIENT
Start: 2019-06-25 | End: 2019-06-27 | Stop reason: HOSPADM

## 2019-06-25 RX ORDER — CYCLOBENZAPRINE HCL 10 MG
10 TABLET ORAL 3 TIMES DAILY PRN
Status: DISCONTINUED | OUTPATIENT
Start: 2019-06-25 | End: 2019-06-27 | Stop reason: HOSPADM

## 2019-06-25 RX ORDER — ACETAMINOPHEN 325 MG/1
650 TABLET ORAL EVERY 4 HOURS PRN
Status: DISCONTINUED | OUTPATIENT
Start: 2019-06-28 | End: 2019-06-26

## 2019-06-25 RX ORDER — DEXTROSE MONOHYDRATE 25 G/50ML
25-50 INJECTION, SOLUTION INTRAVENOUS
Status: DISCONTINUED | OUTPATIENT
Start: 2019-06-25 | End: 2019-06-27 | Stop reason: HOSPADM

## 2019-06-25 RX ORDER — KETOROLAC TROMETHAMINE 15 MG/ML
15 INJECTION, SOLUTION INTRAMUSCULAR; INTRAVENOUS EVERY 6 HOURS PRN
Status: COMPLETED | OUTPATIENT
Start: 2019-06-25 | End: 2019-06-26

## 2019-06-25 RX ORDER — MODAFINIL 100 MG/1
100 TABLET ORAL DAILY
Status: DISCONTINUED | OUTPATIENT
Start: 2019-06-25 | End: 2019-06-27 | Stop reason: HOSPADM

## 2019-06-25 RX ORDER — FENTANYL CITRATE 50 UG/ML
INJECTION, SOLUTION INTRAMUSCULAR; INTRAVENOUS PRN
Status: DISCONTINUED | OUTPATIENT
Start: 2019-06-25 | End: 2019-06-25

## 2019-06-25 RX ORDER — PROPOFOL 10 MG/ML
INJECTION, EMULSION INTRAVENOUS CONTINUOUS PRN
Status: DISCONTINUED | OUTPATIENT
Start: 2019-06-25 | End: 2019-06-25

## 2019-06-25 RX ORDER — NALOXONE HYDROCHLORIDE 0.4 MG/ML
.1-.4 INJECTION, SOLUTION INTRAMUSCULAR; INTRAVENOUS; SUBCUTANEOUS
Status: ACTIVE | OUTPATIENT
Start: 2019-06-25 | End: 2019-06-26

## 2019-06-25 RX ORDER — SCOLOPAMINE TRANSDERMAL SYSTEM 1 MG/1
1 PATCH, EXTENDED RELEASE TRANSDERMAL ONCE
Status: COMPLETED | OUTPATIENT
Start: 2019-06-25 | End: 2019-06-25

## 2019-06-25 RX ORDER — ONDANSETRON 4 MG/1
4 TABLET, ORALLY DISINTEGRATING ORAL EVERY 30 MIN PRN
Status: DISCONTINUED | OUTPATIENT
Start: 2019-06-25 | End: 2019-06-25 | Stop reason: HOSPADM

## 2019-06-25 RX ORDER — FENTANYL CITRATE 50 UG/ML
25-50 INJECTION, SOLUTION INTRAMUSCULAR; INTRAVENOUS
Status: DISCONTINUED | OUTPATIENT
Start: 2019-06-25 | End: 2019-06-25 | Stop reason: HOSPADM

## 2019-06-25 RX ORDER — CEFAZOLIN SODIUM 2 G/100ML
2 INJECTION, SOLUTION INTRAVENOUS
Status: COMPLETED | OUTPATIENT
Start: 2019-06-25 | End: 2019-06-25

## 2019-06-25 RX ORDER — METOCLOPRAMIDE HYDROCHLORIDE 5 MG/ML
10 INJECTION INTRAMUSCULAR; INTRAVENOUS EVERY 6 HOURS PRN
Status: DISCONTINUED | OUTPATIENT
Start: 2019-06-25 | End: 2019-06-27 | Stop reason: HOSPADM

## 2019-06-25 RX ORDER — METOCLOPRAMIDE 10 MG/1
10 TABLET ORAL EVERY 6 HOURS PRN
Status: DISCONTINUED | OUTPATIENT
Start: 2019-06-25 | End: 2019-06-27 | Stop reason: HOSPADM

## 2019-06-25 RX ADMIN — HYDROMORPHONE HYDROCHLORIDE 4 MG: 2 TABLET ORAL at 21:18

## 2019-06-25 RX ADMIN — HYDROMORPHONE HYDROCHLORIDE 4 MG: 2 TABLET ORAL at 17:19

## 2019-06-25 RX ADMIN — KETOROLAC TROMETHAMINE 15 MG: 15 INJECTION, SOLUTION INTRAMUSCULAR; INTRAVENOUS at 18:40

## 2019-06-25 RX ADMIN — SCOPALAMINE 1 PATCH: 1 PATCH, EXTENDED RELEASE TRANSDERMAL at 11:15

## 2019-06-25 RX ADMIN — DEXAMETHASONE SODIUM PHOSPHATE 5 MG: 10 INJECTION, SOLUTION INTRAMUSCULAR; INTRAVENOUS at 14:17

## 2019-06-25 RX ADMIN — PROPOFOL 100 MCG/KG/MIN: 10 INJECTION, EMULSION INTRAVENOUS at 13:45

## 2019-06-25 RX ADMIN — PHENYLEPHRINE HYDROCHLORIDE 100 MCG: 10 INJECTION INTRAVENOUS at 14:52

## 2019-06-25 RX ADMIN — PHENYLEPHRINE HYDROCHLORIDE 100 MCG: 10 INJECTION INTRAVENOUS at 15:10

## 2019-06-25 RX ADMIN — METFORMIN HYDROCHLORIDE 1000 MG: 1000 TABLET ORAL at 17:35

## 2019-06-25 RX ADMIN — PHENYLEPHRINE HYDROCHLORIDE 100 MCG: 10 INJECTION INTRAVENOUS at 15:04

## 2019-06-25 RX ADMIN — SODIUM CHLORIDE 100 ML/HR: 9 INJECTION, SOLUTION INTRAVENOUS at 17:14

## 2019-06-25 RX ADMIN — CEFAZOLIN SODIUM 2 G: 2 INJECTION, SOLUTION INTRAVENOUS at 21:13

## 2019-06-25 RX ADMIN — ONDANSETRON 4 MG: 2 INJECTION INTRAMUSCULAR; INTRAVENOUS at 14:17

## 2019-06-25 RX ADMIN — CEFAZOLIN SODIUM 2 G: 2 INJECTION, SOLUTION INTRAVENOUS at 13:42

## 2019-06-25 RX ADMIN — PHENYLEPHRINE HYDROCHLORIDE 100 MCG: 10 INJECTION INTRAVENOUS at 14:37

## 2019-06-25 RX ADMIN — TRANEXAMIC ACID 1 G: 100 INJECTION, SOLUTION INTRAVENOUS at 15:03

## 2019-06-25 RX ADMIN — LIDOCAINE HYDROCHLORIDE 40 MG: 20 INJECTION, SOLUTION INFILTRATION; PERINEURAL at 14:30

## 2019-06-25 RX ADMIN — MIDAZOLAM 2 MG: 1 INJECTION INTRAMUSCULAR; INTRAVENOUS at 13:36

## 2019-06-25 RX ADMIN — SODIUM CHLORIDE, POTASSIUM CHLORIDE, SODIUM LACTATE AND CALCIUM CHLORIDE: 600; 310; 30; 20 INJECTION, SOLUTION INTRAVENOUS at 14:20

## 2019-06-25 RX ADMIN — BUPIVACAINE HYDROCHLORIDE IN DEXTROSE 1.6 ML: 7.5 INJECTION, SOLUTION SUBARACHNOID at 13:46

## 2019-06-25 RX ADMIN — ACETAMINOPHEN 975 MG: 325 TABLET, FILM COATED ORAL at 17:12

## 2019-06-25 RX ADMIN — SODIUM CHLORIDE, POTASSIUM CHLORIDE, SODIUM LACTATE AND CALCIUM CHLORIDE: 600; 310; 30; 20 INJECTION, SOLUTION INTRAVENOUS at 12:02

## 2019-06-25 RX ADMIN — FENTANYL CITRATE 100 MCG: 50 INJECTION, SOLUTION INTRAMUSCULAR; INTRAVENOUS at 13:45

## 2019-06-25 RX ADMIN — SENNOSIDES AND DOCUSATE SODIUM 1 TABLET: 8.6; 5 TABLET ORAL at 21:15

## 2019-06-25 RX ADMIN — TRANEXAMIC ACID 1 G: 100 INJECTION, SOLUTION INTRAVENOUS at 14:17

## 2019-06-25 ASSESSMENT — ACTIVITIES OF DAILY LIVING (ADL): ADLS_ACUITY_SCORE: 13

## 2019-06-25 NOTE — ANESTHESIA CARE TRANSFER NOTE
Patient: Elizabeth Rankin    Procedure(s):  DIRECT ANTERIOR LEFT TOTAL HIP ARTHROPLASTY    Diagnosis: DJD LEFT HIP  Diagnosis Additional Information: No value filed.    Anesthesia Type:   Spinal     Note:  Airway :Nasal Cannula  Patient transferred to:PACU  Handoff Report: Identifed the Patient, Identified the Reponsible Provider, Reviewed the pertinent medical history, Discussed the surgical course, Reviewed Intra-OP anesthesia mangement and issues during anesthesia, Set expectations for post-procedure period and Allowed opportunity for questions and acknowledgement of understanding      Vitals: (Last set prior to Anesthesia Care Transfer)    CRNA VITALS  6/25/2019 1508 - 6/25/2019 1552      6/25/2019             Resp Rate (set):  8                Electronically Signed By: LAUREN Kruse CRNA  June 25, 2019  3:52 PM

## 2019-06-25 NOTE — PLAN OF CARE
[facility] Inpatient Admission Note:    Patient admitted to 3224/3224-1 at approximately 1630 via cart accompanied by nurse from surgery . Report received from PACU RN in SBAR format at 1640 via face to face in room. Patient transferred to bed via slide board.. Patient is alert and oriented X 3, denies pain; rates at 0 on 0-10 scale.  Patient oriented to room, unit, hourly rounding, and plan of care. Explained admission packet and patient handbook with patient bill of rights brochure. Will continue to monitor and document as needed.     Inpatient Nursing criteria listed below was met:    Health care directives status obtained and documented: Yes    Care Everywhere authorization obtained No    MRSA swab completed for patient 65 years and older: N/A    Patient identifies a surrogate decision maker: No    Core Measure diagnosis present:Yes - If yes, state diagnosis: Hip surgery.      If initial lactic acid >2.0, repeat lactic acid drawn within one hour of arrival to unit: NA.    Vaccination assessment and education completed: Yes   Vaccinations received prior to admission: Pneumovax NA  Influenza(seasonal)  YES   Vaccination(s) ordered: patient declines    Clergy visit ordered if patient requests: N/A    Skin issues/needs documented: Yes    Isolation Patient: no Education given, correct sign in place and documentation row added to PCS:  NA    Fall Prevention Yes: Care plan updated, education given and documented, sticker and magnet in place: Yes    Care Plan initiated: Yes    Education Documented (including assessment): Yes    Patient has discharge needs : Yes If yes, please explain: possible outpatient therapy

## 2019-06-25 NOTE — PROGRESS NOTES
[facility]    Medicine Progress Note - Hospitalist Service       Date of Admission:  6/25/2019  Assessment & Plan     1. S/p Left Direct Anterior Total Hip Arthroplasty; under spinal anesthesia; EBL 250cc. Doing well following surgery   -pain control, diet, dvt ppx per surgery service   -Hgb and BMP in AM     2. Hx of Type II DM   -invokana   -continue metformin   -sliding scale insulin with hypoglycemia protocol     3. Hx of chronic menorrhagia   4. Hx of Bipolar d/o    Diet: Advance Diet as Tolerated: Regular Diet Adult    DVT Prophylaxis: Defer to primary service  Alegre Catheter: not present  Code Status: Full Code      Disposition Plan   Per Ortho Service    Jadon Ulrich MD  Hospitalist Service  [facility]    ______________________________________________________________________    Interval History   Patient underwent     Data reviewed today: I reviewed all medications, new labs and imaging results over the last 24 hours. I personally reviewed today's labs    Physical Exam   Vital Signs: Temp: 97.9  F (36.6  C) Temp src: Oral BP: 137/64   Heart Rate: 66 Resp: 16 SpO2: 96 % O2 Device: None (Room air)    Weight: 0 lbs 0 oz    Gen: no acute distress  HEENT: NCAT EOMI MMM  Neck: supple  CV: RRR normal s1 s2  Lungs: CTAB  Abd: soft, nt, nd  Neuro: AOX3, CN grossly intact; nonfocal screening exam  Skin: warm, dry, no rash on face  MSK: age appropriate muscle mass  Psych: appropriate affect        Data   No lab results found in last 7 days.    Recent Results (from the past 24 hour(s))   XR Surgery TATIANA Fluoro L/T 5 Min    Narrative    This exam was marked as non-reportable because it will not be read by a   radiologist or a [place] non-radiologist provider.             XR Pelvis Port 1/2 Views    Narrative    PROCEDURE: XR PELVIS PORT 1/2 VW 6/25/2019 4:11 PM    HISTORY: AP Pelvis Only; Status post Left PATRICK    COMPARISONS: None.    TECHNIQUE: 1 view    FINDINGS: Is a hip prosthesis in place  bilaterally. Postoperative  changes are seen in the region of the left hip.         Impression    IMPRESSION: Left hip prosthesis in place    NORMA FRIEND MD

## 2019-06-25 NOTE — OP NOTE
Preoperative Diagnosis: Left Hip Osteoarthritis    Postoperative Diagnosis: Left Hip Osteoarthritis    Procedure: Left Direct Anterior Total Hip Arthroplasty    Surgeon: Cirilo Miranda MD    Assistants: Jeremías HOPSON    A skilled first assistant was required for patient positioning, retracting, and carrying out the procedure in a safe and effective manner    Anesthesia:   1) Spinal with Sedation  2) Local Injection around surgical site    Findings:    1) Satisfactory PATRICK with near equal restoration of leg length and offset postoperatively    2) Adequate hemostasis     Implants:    1) Smith [employer] Nephew Polar Femoral Stem Size 0 Standard Offset   2) Size 50 mm R3 Acetabular Shell   3) Size 32 mm standard poly acetabular insert   4) Size 32 mm 0 Oxinium Femoral Head      Estimated Blood Loss: 250 ml    Specimens: None    Drains: None    Complications: None    Indications:   This is a 54 year old patient with long standing Left hip pain and severe osteoarthritis.  They have failed nonoperative treatment including use of NSAIDs; Tylenol; injections and exercise.  Given the continued symptoms they wished to proceed with a hip replacement.  The risks including pain, bleeding, infection, deep vein thrombosis, pulmonary embolism, myocardial infarction, component failure, need for revision operation was discussed with the patient.  The surgical consent was signed and surgical site was marked.  All questions regarding postoperative disposition were answered.      Description of Procedure:   The patient was taken to the operating room and placed under spinal anesthesia.  They were then moved to the [place] bed and positioned in standard fashion.  The C-arm was used to make a templating radiograph for post reconstruction comparison.  The Left hip was prepped with a Chloraprep wipe and sponge then draped in sterile fashion.  A timeout was called to identify the correct patient and surgical site.  An anterolateral incision and  direct anterior approach to the hip was utilized.  Care was taken to cauterize the circumflex vessels.  A capsulotomy was completed and tag sutures were placed.  The femoral neck was exposed and an osteotomy was completed.  A secondary cut was made to make removing the femoral head/neck easier.  The hip was externally rotated and the labrum excised.  Further release on the proximal femur was completed to improve visualization of the proximal femur later in the case.  The C-arm was used during reaming the acetabulum to check alignment and depth of reaming.  The acetabulum was reamed to a size 50 mm and the acetabular shell was placed.  A 6.5 mm bone screw 25 mm in length was placed along with a hole eliminator.  The final 32 mm poly insert was placed and checked for a secure fit.  The traction was released and standard capsular releases were completed on the proximal femur.  The leg was externally rotated 120 degrees and brought into adduction and extension.  Standard retractor placement was utilized.  The femur was prepared with a box osteotome, canal finder and broaching up to a size 0.  The trial implants were placed and the hip was reduced.  C-arm was used to compare to preoperative leg length and offset.  The final size 0 Femoral stem was placed with a size 32 Oxinium femoral head and the hip was reduced.  Final C-arm images were used to confirm positioning.  No fractures were identified.  Pulse lavage and betadine irrigation was used.  The capsule was closed with #1 Vicryl sutures.  The tensor fascia was closed with a #1 Vicryl suture in running fashion.  The subcutaneous tissue was closed with a 2-0 Vicryl and staples. An Aquacel dressing was applied.  The patient was awakened and transferred to PACU in stable condition.  A post operative x-ray was taken in PACU.        Postoperative Plan:   Routine PATRICK protocol (Weightbearing as tolerated, No hip ROM precautions, PT, Pain control, DVT prophylaxis with  Aspirin.  Anticipate discharge in 1-2 days.  Follow-up in  2 weeks in [facility].  X-rays at follow-up AP and Cross Table Lateral Left Hip.

## 2019-06-25 NOTE — ANESTHESIA PROCEDURE NOTES
Peripheral nerve/Neuraxial procedure note : intrathecal  Pre-Procedure    Location: OR    Procedure Times:6/25/2019 1:42 PM and 6/25/2019 1:50 PM  Pre-Anesthestic Checklist: patient identified, IV checked, site marked, risks and benefits discussed, informed consent, monitors and equipment checked, pre-op evaluation, at physician/surgeon's request and post-op pain management    Timeout  Correct Patient: Yes   Correct Procedure: Yes   Correct Site: Yes   Correct Laterality: N/A   Correct Position: Yes   Site Marked: N/A   .   Procedure Documentation  ASA 3  Diagnosis:anesthesia.    Procedure:    Intrathecal.  Insertion Site:L2-3  (midline approach)      Patient Prep;chlorhexidine gluconate and isopropyl alcohol.  .  Needle: Rakel tip Spinal Needle (gauge): 22  Spinal/LP Needle Length (inches): 4 # of attempts: 1 and  # of redirects:  1 No introducer used .       Assessment/Narrative  Paresthesias: No.  .  .  clear CSF fluid removed . Time Injected: 13:46

## 2019-06-26 ENCOUNTER — APPOINTMENT (OUTPATIENT)
Dept: PHYSICAL THERAPY | Facility: HOSPITAL | Age: 54
DRG: 470 | End: 2019-06-26
Attending: ORTHOPAEDIC SURGERY
Payer: MEDICARE

## 2019-06-26 ENCOUNTER — APPOINTMENT (OUTPATIENT)
Dept: OCCUPATIONAL THERAPY | Facility: HOSPITAL | Age: 54
DRG: 470 | End: 2019-06-26
Attending: ORTHOPAEDIC SURGERY
Payer: MEDICARE

## 2019-06-26 PROBLEM — G47.10 HYPERSOMNOLENCE DISORDER: Chronic | Status: ACTIVE | Noted: 2019-06-26

## 2019-06-26 PROBLEM — N92.1 MENORRHAGIA WITH IRREGULAR CYCLE: Chronic | Status: ACTIVE | Noted: 2019-06-26

## 2019-06-26 PROBLEM — M43.16 SPONDYLOLISTHESIS OF LUMBAR REGION: Chronic | Status: ACTIVE | Noted: 2019-06-26

## 2019-06-26 PROBLEM — Z15.89 HLA B27 (HLA B27 POSITIVE): Chronic | Status: ACTIVE | Noted: 2019-06-26

## 2019-06-26 PROBLEM — F31.9 BIPOLAR DISORDER (H): Status: ACTIVE | Noted: 2019-06-26

## 2019-06-26 PROBLEM — E78.00 ELEVATED LDL CHOLESTEROL LEVEL: Chronic | Status: ACTIVE | Noted: 2019-06-26

## 2019-06-26 LAB
ANION GAP SERPL CALCULATED.3IONS-SCNC: 6 MMOL/L (ref 3–14)
BUN SERPL-MCNC: 11 MG/DL (ref 7–30)
CALCIUM SERPL-MCNC: 8.6 MG/DL (ref 8.5–10.1)
CHLORIDE SERPL-SCNC: 108 MMOL/L (ref 94–109)
CO2 SERPL-SCNC: 24 MMOL/L (ref 20–32)
CREAT SERPL-MCNC: 0.56 MG/DL (ref 0.52–1.04)
ERYTHROCYTE [DISTWIDTH] IN BLOOD BY AUTOMATED COUNT: 14.5 % (ref 10–15)
GFR SERPL CREATININE-BSD FRML MDRD: >90 ML/MIN/{1.73_M2}
GLUCOSE BLDC GLUCOMTR-MCNC: 107 MG/DL (ref 70–99)
GLUCOSE BLDC GLUCOMTR-MCNC: 111 MG/DL (ref 70–99)
GLUCOSE BLDC GLUCOMTR-MCNC: 98 MG/DL (ref 70–99)
GLUCOSE BLDC GLUCOMTR-MCNC: 99 MG/DL (ref 70–99)
GLUCOSE SERPL-MCNC: 117 MG/DL (ref 70–99)
HCT VFR BLD AUTO: 33.2 % (ref 35–47)
HGB BLD-MCNC: 11 G/DL (ref 11.7–15.7)
MCH RBC QN AUTO: 27.3 PG (ref 26.5–33)
MCHC RBC AUTO-ENTMCNC: 33.1 G/DL (ref 31.5–36.5)
MCV RBC AUTO: 82 FL (ref 78–100)
PLATELET # BLD AUTO: 309 10E9/L (ref 150–450)
POTASSIUM SERPL-SCNC: 3.7 MMOL/L (ref 3.4–5.3)
RBC # BLD AUTO: 4.03 10E12/L (ref 3.8–5.2)
SODIUM SERPL-SCNC: 138 MMOL/L (ref 133–144)
WBC # BLD AUTO: 12.5 10E9/L (ref 4–11)

## 2019-06-26 PROCEDURE — 99232 SBSQ HOSP IP/OBS MODERATE 35: CPT | Performed by: NURSE PRACTITIONER

## 2019-06-26 PROCEDURE — 97530 THERAPEUTIC ACTIVITIES: CPT | Mod: GP

## 2019-06-26 PROCEDURE — 85018 HEMOGLOBIN: CPT | Performed by: ORTHOPAEDIC SURGERY

## 2019-06-26 PROCEDURE — 97165 OT EVAL LOW COMPLEX 30 MIN: CPT | Mod: GO

## 2019-06-26 PROCEDURE — 97161 PT EVAL LOW COMPLEX 20 MIN: CPT | Mod: GP

## 2019-06-26 PROCEDURE — 40000275 ZZH STATISTIC RCP TIME EA 10 MIN

## 2019-06-26 PROCEDURE — 80048 BASIC METABOLIC PNL TOTAL CA: CPT | Performed by: HOSPITALIST

## 2019-06-26 PROCEDURE — 97530 THERAPEUTIC ACTIVITIES: CPT | Mod: GO

## 2019-06-26 PROCEDURE — 25000128 H RX IP 250 OP 636: Performed by: ORTHOPAEDIC SURGERY

## 2019-06-26 PROCEDURE — 00000146 ZZHCL STATISTIC GLUCOSE BY METER IP

## 2019-06-26 PROCEDURE — 25000125 ZZHC RX 250: Performed by: ORTHOPAEDIC SURGERY

## 2019-06-26 PROCEDURE — 25000132 ZZH RX MED GY IP 250 OP 250 PS 637: Mod: GY | Performed by: NURSE PRACTITIONER

## 2019-06-26 PROCEDURE — 25000131 ZZH RX MED GY IP 250 OP 636 PS 637: Mod: GY | Performed by: ORTHOPAEDIC SURGERY

## 2019-06-26 PROCEDURE — 12000000 ZZH R&B MED SURG/OB

## 2019-06-26 PROCEDURE — 25000132 ZZH RX MED GY IP 250 OP 250 PS 637: Mod: GY | Performed by: ORTHOPAEDIC SURGERY

## 2019-06-26 PROCEDURE — 85027 COMPLETE CBC AUTOMATED: CPT | Performed by: HOSPITALIST

## 2019-06-26 PROCEDURE — 36415 COLL VENOUS BLD VENIPUNCTURE: CPT | Performed by: HOSPITALIST

## 2019-06-26 PROCEDURE — 97110 THERAPEUTIC EXERCISES: CPT | Mod: GP

## 2019-06-26 RX ORDER — ACETAMINOPHEN 325 MG/1
975 TABLET ORAL EVERY 4 HOURS PRN
Status: DISCONTINUED | OUTPATIENT
Start: 2019-06-26 | End: 2019-06-27 | Stop reason: HOSPADM

## 2019-06-26 RX ORDER — ASPIRIN 325 MG
325 TABLET, DELAYED RELEASE (ENTERIC COATED) ORAL
Qty: 60 TABLET | Refills: 0 | Status: ON HOLD | OUTPATIENT
Start: 2019-06-26 | End: 2019-08-05

## 2019-06-26 RX ORDER — ACETAMINOPHEN 325 MG/1
975 TABLET ORAL EVERY 6 HOURS PRN
Qty: 60 TABLET | Refills: 0 | Status: ON HOLD | OUTPATIENT
Start: 2019-06-26 | End: 2019-08-05

## 2019-06-26 RX ORDER — HYDROCODONE BITARTRATE AND ACETAMINOPHEN 5; 325 MG/1; MG/1
1-2 TABLET ORAL EVERY 4 HOURS PRN
Qty: 30 TABLET | Refills: 0 | Status: SHIPPED | OUTPATIENT
Start: 2019-06-26 | End: 2019-07-02

## 2019-06-26 RX ORDER — CEPHALEXIN 500 MG/1
500 CAPSULE ORAL 3 TIMES DAILY
Qty: 30 CAPSULE | Refills: 0 | Status: ON HOLD | OUTPATIENT
Start: 2019-06-26 | End: 2019-08-05

## 2019-06-26 RX ORDER — HYDROCODONE BITARTRATE AND ACETAMINOPHEN 5; 325 MG/1; MG/1
1-2 TABLET ORAL EVERY 4 HOURS PRN
Status: DISCONTINUED | OUTPATIENT
Start: 2019-06-26 | End: 2019-06-27 | Stop reason: HOSPADM

## 2019-06-26 RX ORDER — HYDROMORPHONE HYDROCHLORIDE 2 MG/1
4 TABLET ORAL
Status: DISCONTINUED | OUTPATIENT
Start: 2019-06-26 | End: 2019-06-27 | Stop reason: HOSPADM

## 2019-06-26 RX ORDER — HYDROMORPHONE HYDROCHLORIDE 4 MG/1
4 TABLET ORAL
Qty: 15 TABLET | Refills: 0 | Status: SHIPPED | OUTPATIENT
Start: 2019-06-26 | End: 2019-07-02

## 2019-06-26 RX ADMIN — METFORMIN HYDROCHLORIDE 1000 MG: 1000 TABLET ORAL at 17:54

## 2019-06-26 RX ADMIN — HYDROCODONE BITARTRATE AND ACETAMINOPHEN 2 TABLET: 5; 325 TABLET ORAL at 22:32

## 2019-06-26 RX ADMIN — METFORMIN HYDROCHLORIDE 1000 MG: 1000 TABLET ORAL at 08:20

## 2019-06-26 RX ADMIN — ASPIRIN 325 MG: 325 TABLET, COATED ORAL at 15:38

## 2019-06-26 RX ADMIN — KETOROLAC TROMETHAMINE 15 MG: 15 INJECTION, SOLUTION INTRAMUSCULAR; INTRAVENOUS at 06:34

## 2019-06-26 RX ADMIN — HYDROMORPHONE HYDROCHLORIDE 4 MG: 2 TABLET ORAL at 08:20

## 2019-06-26 RX ADMIN — KETOROLAC TROMETHAMINE 15 MG: 15 INJECTION, SOLUTION INTRAMUSCULAR; INTRAVENOUS at 13:11

## 2019-06-26 RX ADMIN — CYCLOBENZAPRINE HYDROCHLORIDE 10 MG: 10 TABLET, FILM COATED ORAL at 11:35

## 2019-06-26 RX ADMIN — HYDROCODONE BITARTRATE AND ACETAMINOPHEN 2 TABLET: 5; 325 TABLET ORAL at 17:56

## 2019-06-26 RX ADMIN — ACETAMINOPHEN 975 MG: 325 TABLET, FILM COATED ORAL at 08:20

## 2019-06-26 RX ADMIN — KETOROLAC TROMETHAMINE 15 MG: 15 INJECTION, SOLUTION INTRAMUSCULAR; INTRAVENOUS at 23:55

## 2019-06-26 RX ADMIN — HYDROMORPHONE HYDROCHLORIDE 4 MG: 2 TABLET ORAL at 00:32

## 2019-06-26 RX ADMIN — ASPIRIN 325 MG: 325 TABLET, COATED ORAL at 08:20

## 2019-06-26 RX ADMIN — SENNOSIDES AND DOCUSATE SODIUM 1 TABLET: 8.6; 5 TABLET ORAL at 08:21

## 2019-06-26 RX ADMIN — HYDROMORPHONE HYDROCHLORIDE 4 MG: 2 TABLET ORAL at 05:19

## 2019-06-26 RX ADMIN — ONDANSETRON 4 MG: 4 TABLET, ORALLY DISINTEGRATING ORAL at 23:55

## 2019-06-26 RX ADMIN — CYCLOBENZAPRINE HYDROCHLORIDE 10 MG: 10 TABLET, FILM COATED ORAL at 02:13

## 2019-06-26 RX ADMIN — SENNOSIDES AND DOCUSATE SODIUM 2 TABLET: 8.6; 5 TABLET ORAL at 21:36

## 2019-06-26 RX ADMIN — ACETAMINOPHEN 975 MG: 325 TABLET, FILM COATED ORAL at 00:32

## 2019-06-26 RX ADMIN — CEFAZOLIN SODIUM 2 G: 2 INJECTION, SOLUTION INTRAVENOUS at 05:19

## 2019-06-26 ASSESSMENT — ACTIVITIES OF DAILY LIVING (ADL)
ADLS_ACUITY_SCORE: 13

## 2019-06-26 NOTE — PLAN OF CARE
Pt. Has been afebrile through the day, VS as charted.  Her O2 sats are in the upper 90's on RA, lung sounds are clear.  She does rate her pain/discomfort 5/10 and has received PO dilaudid, flexeril, and IV toradol each x 1 - not much change in pain - continues to rate it as a 5/10, but does say that she feels that the medications do help a little.  She did get up and sit in her chair for meals, did ambulate to and from the bathroom with standby assist (walker), and did ambulate in the hallway with PT - tolerated well.  She is saline locked, good oral intake/output, blood glucose levels have been 117 and 98 - no coverage given.  Dressing to left hip is CDI, has had ice pack to hip on and off through the day.  She has remained free of falls, call light within reach - does make her needs known, frequent rounding done to assess needs.

## 2019-06-26 NOTE — PLAN OF CARE
"Patient reports pain around 4/10 to operative hip. States this is tolerable for her but voices extreme apprehension with not receiving her pain medications and her pain getting out of control. Patient states \"I have had issues with narcotics with my other hip because the pain was so bad. I would tell my daughter if she didn't give me my morphine early I would kill myself and it would be her fault.\" Patient states she doesn't want the pain to get to this point again as once her pain was out of control she never regained control during her healing process. Patient given scheduled tylenol, PRN dilaudid and PRN flexeril on shift. Patient observed sleeping throughout the shift. Woke patient at 0200 for dose of flexeril per patient request.   Patient reports numbness around incision still following spinal. The rest of the feeling as returned. CMS intact. Pulses appropriate. Vitals remain stable. BP (!) 118/49   Temp 97.6  F (36.4  C) (Temporal)   Resp 16   LMP 06/06/2019   SpO2 97%  Remains on continuous pulse ox sating in the mid to high 90's on RA. IV saline locked on shift due to patients oral intake being more than adequate. 0200 blood sugar of 111. Antibiotic regimen continues per order.     "

## 2019-06-26 NOTE — PROGRESS NOTES
[facility]    Hospitalist Progress Note    Date of Service (when I saw the patient): 06/26/2019    Assessment & Plan       Status post right hip replacement: Doing well, pain and anxiety regarding pain a problem. However, per therapy she is doing very well.       DM type 2 (diabetes mellitus, type 2) (H): Continue sliding scale and QID glucose checks. Invokana non-formulary and she cannot have anybody bring hers in, continue metformin.       Hypersomnolence disorder: Provigil ordered as at home.       Menorrhagia with irregular cycle: Currently on medroxyprogesterone for recently diagnosed endometrial hyperplasia.        Bipolar disorder (H): Not currently on any long term psychiatric medications.         DVT Prophylaxis: Defer to primary service-ASA per orthopedics  Code Status: Full Code    Disposition: Expected discharge in 1-2 days once approved by orthopedics. Medically stable for discharge.    Mariel Garnica, CNP    Interval History   Pain decently controlled. No chest pain, shortness of breath, abdominal pain.     -Data reviewed today: I reviewed all new labs and imaging results over the last 24 hours.     Physical Exam   Temp: 98.1  F (36.7  C) Temp src: Temporal BP: 108/68   Heart Rate: 69 Resp: 16 SpO2: 97 % O2 Device: None (Room air)    There were no vitals filed for this visit.  Vital Signs with Ranges  Temp:  [97.6  F (36.4  C)-99  F (37.2  C)] 98.1  F (36.7  C)  Heart Rate:  [55-86] 69  Resp:  [16-18] 16  BP: (102-144)/(41-74) 108/68  SpO2:  [93 %-100 %] 97 %  I/O last 3 completed shifts:  In: 0745 [P.O.:360; I.V.:7367]  Out: 925 [Urine:925]    Peripheral IV 06/25/19 Left Hand (Active)   Site Assessment WDL 6/26/2019  8:10 AM   Line Status Saline locked 6/26/2019  8:10 AM   Phlebitis Scale 0-->no symptoms 6/26/2019  8:10 AM   Infiltration Scale 0 6/26/2019  8:10 AM   Number of days: 1       Incision/Surgical Site 06/25/19 Left Hip (Active)   Incision Assessment UTV 6/26/2019  8:09 AM    Closure LIANA 6/26/2019  8:09 AM   Incision Drainage Amount None 6/26/2019  8:09 AM   Drainage Description UTV 6/26/2019  8:09 AM   Incision Care Ice applied 6/26/2019  8:09 AM   Dressing Intervention Clean, dry, intact 6/26/2019  8:09 AM   Number of days: 1     Line/device assessment(s) completed for medical necessity    Constitutional: Awake,alert, no acute distress  Respiratory: Clear bilaterally, no wheezes, crackles, rhonchi.  Cardiovascular: HRR, no murmurs, rubs, thrills.   GI: Soft, nontender, bowel sounds hypoactive.   Skin/Integumen: No open areas, bruising, rashes.   Other:  Bandage in place, clean dry and intake.     Medications     No Medication Sleep Aids for this Patient       sodium chloride Stopped (06/26/19 0324)       aspirin  325 mg Oral BID     insulin aspart  1-7 Units Subcutaneous TID AC     insulin aspart  1-5 Units Subcutaneous At Bedtime     medroxyPROGESTERone  10 mg Oral Daily     metFORMIN  1,000 mg Oral BID w/meals     modafinil  100 mg Oral Daily     scopolamine   Transdermal Once     senna-docusate  1 tablet Oral BID    Or     senna-docusate  2 tablet Oral BID     sodium chloride (PF)  3 mL Intracatheter Q8H       Data   Recent Labs   Lab 06/26/19  0526   WBC 12.5*   HGB 11.0*   MCV 82         POTASSIUM 3.7   CHLORIDE 108   CO2 24   BUN 11   CR 0.56   ANIONGAP 6   ALYSSA 8.6   *       Recent Results (from the past 24 hour(s))   XR Surgery TATIANA Fluoro L/T 5 Min    Narrative    This exam was marked as non-reportable because it will not be read by a   radiologist or a [place] non-radiologist provider.             XR Pelvis Port 1/2 Views    Narrative    PROCEDURE: XR PELVIS PORT 1/2 VW 6/25/2019 4:11 PM    HISTORY: AP Pelvis Only; Status post Left PATRICK    COMPARISONS: None.    TECHNIQUE: 1 view    FINDINGS: Is a hip prosthesis in place bilaterally. Postoperative  changes are seen in the region of the left hip.         Impression    IMPRESSION: Left hip prosthesis in  place    NORMA FRIEND MD
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
## 2019-06-26 NOTE — PLAN OF CARE
Temp: 98.3  F (36.8  C) Temp src: Tympanic BP: 124/74   Heart Rate: 86 Resp: 16 SpO2: 95 % O2 Device: None (Room air)      A&O, c/o knee pain rating 5-7/10, decrease in pain with 4mg dilaudid and IV toradol.  L hip dressing CDI, good CMS, ice pack to area on and off this shift.  Ax1 gait belt and walker, tolerated well.  No c/o nausea or vomiting on regular diet.  IV @ TKO, IV ABX.     Face to face report given with opportunity to observe patient.    Report given to Ileana Maier   6/25/2019  11:02 PM

## 2019-06-26 NOTE — PLAN OF CARE
"Patient verbalizes pain is still on the \"high end\" despite numerous medication and nonpharmacologic interventions. States its tolerable but the level makes her apprehensive to go home. Patient transfers via SBA with walker and gait belt phenomenally.     Face to face report given with opportunity to observe patient.    Report given to TAVON Perla   6/26/2019  7:10 AM      "

## 2019-06-26 NOTE — PROGRESS NOTES
Inpatient Occupational Therapy Evaluation    Name: Elizabeth Rankin MRN# [id number]   Age: 54 year old YOB: 1965     Date of Consultation: 2019  Consultation is requested by:  Dr. Miranda  Specific Consultation Request:  Post-op Total Hip Evaluation  Primary care provider: Danae Yusuf    General Information:   Onset Date: 19    History of Current Problem/Admission: DJD LEFT HIP  Status post right hip replacement    Prior Level of Function: Pt previously independent in ADLs prior to surgery.   Ambulation: 0 - Independent   Transferrin - Independent   Toiletin - Independent    Bathin - Assistive Equipment   Dressin - Independent   Eatin - Independent   Communication: 0 - Understands/communicates without difficulty  Swallowin - swallows foods/liquids without difficulty  Cognition: 0 - no cognitive issues reported    Fall history within the last 6 months: Yes    Current Living Situation: Pt lives with 3 dogs and her daughter who assisted her after her first surgery. They live in a 1 level home; no stairs. Bathroom has a tub/shower with a suction cup grab bar and a regular height toilet with the counter on her left side.     Current Equipment Used at Home: Suction cup grab bar in shower/tub     Patient & Family Goals: Return home    Past Medical History:   Past Medical History:   Diagnosis Date     Bicuspid aortic valve      Bicuspid aortic valve      Bipolar disorder (H) 2019     Depression      DM type 2 (diabetes mellitus, type 2) (H)      Elevated LDL cholesterol level 2019     HLA B27 (HLA B27 positive) 2019     Hyperlipidemia      Hypersomnolence disorder 2019     Menorrhagia with irregular cycle 2019     Nondependent alcohol abuse, unspecified drinking b 2001     PONV (postoperative nausea and vomiting)      Spondylolisthesis of lumbar region 2019     Status post right hip replacement 2019       Past Surgical  History:  Past Surgical History:   Procedure Laterality Date     BIOPSY Right 10/30/2013    right breast biopsy; fibroadenoma     ORTHOPEDIC SURGERY  07/2014    Right total hip Dr. Meño Sullivan      ORTHOPEDIC SURGERY Right 06/16/2015    Right hip psoas release     ORTHOPEDIC SURGERY Left 09/2015    left hip labrum debridement Dr. Meño Sullivan      TUBAL LIGATION         Medications:   Current Facility-Administered Medications   Medication     acetaminophen (TYLENOL) tablet 975 mg     aspirin (ASA) EC tablet 325 mg     benzocaine-menthol (CEPACOL) 15-3.6 MG lozenge 1-2 lozenge     cyclobenzaprine (FLEXERIL) tablet 10 mg     glucose gel 15-30 g    Or     dextrose 50 % injection 25-50 mL    Or     glucagon injection 1 mg     diphenhydrAMINE (BENADRYL) capsule 25 mg    Or     diphenhydrAMINE (BENADRYL) injection 25 mg     HYDROcodone-acetaminophen (NORCO) 5-325 MG per tablet 1-2 tablet     HYDROmorphone (PF) (DILAUDID) injection 0.3-0.5 mg     insulin aspart (NovoLOG) inj (RAPID ACTING)     insulin aspart (NovoLOG) inj (RAPID ACTING)     ketorolac (TORADOL) injection 15 mg     lidocaine (LMX4) kit     lidocaine 1 % 0.1-1 mL     medroxyPROGESTERone (PROVERA) tablet 10 mg     metFORMIN (GLUCOPHAGE) tablet 1,000 mg     metoclopramide (REGLAN) tablet 10 mg    Or     metoclopramide (REGLAN) injection 10 mg     modafinil (PROVIGIL) tablet 100 mg     naloxone (NARCAN) injection 0.1-0.4 mg     naloxone (NARCAN) injection 0.1-0.4 mg     naloxone (NARCAN) injection 0.1-0.4 mg     No Medication Sleep Aids for this Patient     ondansetron (ZOFRAN-ODT) ODT tab 4 mg    Or     ondansetron (ZOFRAN) injection 4 mg     scopolamine (TRANSDERM-SCOP) patch REMOVAL     senna-docusate (SENOKOT-S/PERICOLACE) 8.6-50 MG per tablet 1 tablet    Or     senna-docusate (SENOKOT-S/PERICOLACE) 8.6-50 MG per tablet 2 tablet     sodium chloride (PF) 0.9% PF flush 3 mL     sodium chloride (PF) 0.9% PF flush 3 mL     sodium chloride 0.9%  infusion       Weight Bearing Status: WBAT     Cognitive Status Examination:  Orientation: awake and alert  Level of Consciousness: alert  Follows Commands and Answers Questions: 100% of the time  Personal Safety and Judgement: Intact  Memory: Immediate recall intact and Long-term memory intact  Comments: Pt not understanding why she needs to use a walker. Pt stated she doesn't need it and it is just in her way.     Pain:   Currently in pain? Yes  Pain Location? left hip  Pain Ratin    Occupational Therapy Evaluation:   Integumentary/Edema: no significant findings    Range of Motion: BUE's WFL   Strength: BUE's grossly 4+/5  Hand Strength: Bilateral gross grasp good  Muscle Tone Assessment: no deficits  Coordination: normal     Mobility:   Transfer Skills: SBA sit<>stand  Toilet Transfer: SBA using the grab bar simulating her counter at home   Balance: no LOB     ADLs:   Lower Body Dressing: Pt independent with RLE. Pt doffed her L sock modified independently; she could not don her sock though. Pt was able to using the sock aid modified independently. Per pt report, she donned her shorts independently. Pt able to independently don slip on shoes/sandals.   Toileting: Independent   Grooming: SBA    IADLs:   Previous Responsibilities of the Patient: Medication Management, Finances , Driving  and Work   Comments: Pt's daughter assists with IADLs as pt has ankylosing spondylitis. 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     Activities of Daily Living Analysis:   Impairments Contributing to Impaired Activities of Daily Living: pain  Comments: Pt too painful to don her L sock without assistive device    Occupational Therapy Interventions: ADL Retraining     Clinical Impressions:  Criteria for Skilled Therapeutic Intervention Met: Yes, treatment indicated  OT Diagnosis: L PATRICK  Influenced by the following impairments: Pain  Functional limitations due to impairment: LB dressing  Clinical presentation:  "Stable/Uncomplicated  Clinical presentation rationale: clinical judgement   Clinical Decision making (complexity): Low Complexity  Frequency: 5 times/week  Predicted Duration of Therapy Intervention (days/wks): 1 day  Anticipated Discharge Disposition: Home with Assist  Anticipated Equipment Needs at Discharge: discussed possibility of pt getting a sock aid for LB Dressing if she continues to have difficulties in the future   Risks and Benefits of therapy have been explained: Yes  Patient, Family & other staff in agreement with plan of care: Yes  Clinical Impression Comments: Pt doing fairly well 1 day s/p L PATRICK. She was able to complete most ADLs and functional mobility with SBA to modified independence. Pt was unable to don her L sock without assistive device, however, pt does not have at home currently. Pt stated she was able to manage her shorts but not her shoes this morning. No concerns with pt's home accessibility. Pt did report her daughter is not enthusiastic about assisting her after this surgery as the previous one was a \"bad experience\" however pt did report she could don her socks. Will check in with pt in the morning to work on her shoes and then most likely discharge OT.     Total Eval Time: 10  "

## 2019-06-26 NOTE — PROGRESS NOTES
Inpatient Physical Therapy Evaluation    Name: Elizabeth Rankin MRN# [id number]   Age: 54 year old YOB: 1965     Date of Consultation: 2019  Consultation is requested by:  Dr. Miranda  Specific Consultation Request:  S/p JYOTHI NGUYEN  Primary care provider: Danae Yusuf    General Information:   Onset Date: 19    History of Current Problem/Admission: DJD LEFT HIP  Status post right hip replacement    Prior Level of Function: Pt was independent with all mobility prior to hospitalization   Ambulation: 0 - Independent   Transferrin - Independent   Toiletin - Independent    Bathin - Independent   Dressin - Independent   Eatin - Independent   Communication: 0 - Understands/communicates without difficulty  Swallowin - swallows foods/liquids without difficulty  Cognition: 0 - no cognitive issues reported    Fall history within the last 6 months: Yes    Current Living Situation: Pt lives at home independently. Patient has 0 stairs to enter the home.     Current Equipment Used at Home: None     Patient & Family Goals: Return home     Past Medical History:   Past Medical History:   Diagnosis Date     Bicuspid aortic valve      Bicuspid aortic valve      Depression      DM type 2 (diabetes mellitus, type 2) (H)      Hyperlipidemia      Nondependent alcohol abuse, unspecified drinking b 2001     PONV (postoperative nausea and vomiting)        Past Surgical History:  Past Surgical History:   Procedure Laterality Date     BIOPSY Right 10/30/2013    right breast biopsy; fibroadenoma     ORTHOPEDIC SURGERY  2014    Right total hip Dr. Meño Sullivan      ORTHOPEDIC SURGERY Right 2015    Right hip psoas release     ORTHOPEDIC SURGERY Left 2015    left hip labrum debridement Dr. Meño Sullivan      TUBAL LIGATION         Medications:   Current Facility-Administered Medications   Medication     [START ON 2019] acetaminophen (TYLENOL) tablet 650 mg      "acetaminophen (TYLENOL) tablet 975 mg     aspirin (ASA) EC tablet 325 mg     benzocaine-menthol (CEPACOL) 15-3.6 MG lozenge 1-2 lozenge     cyclobenzaprine (FLEXERIL) tablet 10 mg     glucose gel 15-30 g    Or     dextrose 50 % injection 25-50 mL    Or     glucagon injection 1 mg     diphenhydrAMINE (BENADRYL) capsule 25 mg    Or     diphenhydrAMINE (BENADRYL) injection 25 mg     HYDROmorphone (DILAUDID) tablet 4 mg     HYDROmorphone (PF) (DILAUDID) injection 0.3-0.5 mg     insulin aspart (NovoLOG) inj (RAPID ACTING)     insulin aspart (NovoLOG) inj (RAPID ACTING)     ketorolac (TORADOL) injection 15 mg     lidocaine (LMX4) kit     lidocaine 1 % 0.1-1 mL     medroxyPROGESTERone (PROVERA) tablet 10 mg     metFORMIN (GLUCOPHAGE) tablet 1,000 mg     metoclopramide (REGLAN) tablet 10 mg    Or     metoclopramide (REGLAN) injection 10 mg     modafinil (PROVIGIL) tablet 100 mg     naloxone (NARCAN) injection 0.1-0.4 mg     naloxone (NARCAN) injection 0.1-0.4 mg     naloxone (NARCAN) injection 0.1-0.4 mg     No Medication Sleep Aids for this Patient     ondansetron (ZOFRAN-ODT) ODT tab 4 mg    Or     ondansetron (ZOFRAN) injection 4 mg     scopolamine (TRANSDERM-SCOP) Patch in Place     scopolamine (TRANSDERM-SCOP) patch REMOVAL     senna-docusate (SENOKOT-S/PERICOLACE) 8.6-50 MG per tablet 1 tablet    Or     senna-docusate (SENOKOT-S/PERICOLACE) 8.6-50 MG per tablet 2 tablet     sodium chloride (PF) 0.9% PF flush 3 mL     sodium chloride (PF) 0.9% PF flush 3 mL     sodium chloride 0.9% infusion       Weight Bearing Status: WBAT     Cognitive Status Examination:  Orientation: awake and alert  Level of Consciousness: alert  Follows Commands and Answers Questions: 100% of the time  Personal Safety and Judgement: Intact  Memory: Immediate recall intact  Comments:     Pain:   Currently in pain? Yes  Pain Location? left hip  Pain Rating: Would not give a number. Said it was \"very sore\" and got worse with walking     Physical " Therapy Evaluation:   Integumentary/Edema: No issues observed   ROM: WFL  Strength: Good quad strength  Bed Mobility: Independent   Transfers: SBA with FWW  Gait: Pt ambulated 200ft with FWW and SBA with 1 standing rest break. Pt steady and safe with walker however doesn't think she needs walker. Educated that it is recommend at first for safety.  C/o increased pain but moves well and unassisted   Stairs: NA  Balance: Good with walker  Sensory: No issues observed  Coordination: Normal     Physical Therapy Interventions: Bed Mobility, Gait Training , ROM, Strengthening, Stretching , Transfer Training and Home Programming Guidelines    Clinical Impressions:  Criteria for Skilled Therapeutic Intervention Met: Yes, treatment indicated  PT Diagnosis: s/p L PATRICK   Influenced by the following impairments: Impaired gait, pain  Functional limitations due to impairment: Decreased tolerance for daily activities   Clinical presentation: Stable/Uncomplicated  Clinical presentation rationale: Clinical judgement   Clinical Decision making (complexity): Low Complexity  Frequency: 2 times/day   Predicted Duration of Therapy Intervention (days/wks): 5 days    Anticipated Discharge Disposition: Home with Assist  Anticipated Equipment Needs at Discharge: walker  Risks and Benefits of therapy have been explained: Yes  Patient, Family & other staff in agreement with plan of care: Yes  Clinical Impression Comments: PT evaluation s/p L PATRICK. Pt doing well. C/o increased pain during activity but was able to tolerate treatment well and only needed SBA or activity. Pt plans to discharge home with daughter to assist. Recommend walker for pt to take home but currently pt does not think she needs a walker. Plan to treat pt 2x/day during her stay. Pt follows up with Dr. Miranda in 2 weeks and will determine need for continued PT at that time.     Total Eval Time: 15

## 2019-06-26 NOTE — PROGRESS NOTES
Subjective: The patient is POD #1 s/p Left Total Hip Arthroplasty.  The patients pain is controlled fairly well.  They deny shortness of breath, chest pain, nausea or vomiting.  There have been no acute perioperative complications    Objective:   B/P: 108/68, Temp: 98.1, Pulse: Data Unavailable, Resp: 16    Alert and Orientated x3; No Acute Distress; Appears comfortable     Hip Exam: dressing/wound is clean, dry, intact without significant drainage.  No erythema or signs of infection.     No evidence of DVT    Distal motor/sensory exam is intact in the superficial peroneal, deep peroneal and tibial nerve distributions.      Normal capillary refill with a palpable dorsalis pedis pulse.    Hemoglobin   Date Value Ref Range Status   06/26/2019 11.0 (L) 11.7 - 15.7 g/dL Final       Assessment/Plan:     1) POD # 1 S/P Left Total Hip Arthroplasty  -Pain controlled  -PT/OT--- Strengthening and Gait training  -WBAT; No Hip ROM Precautions  -DVT prophylaxis  -Dispo--To Home when cleared Medically and by PT--tomorrow.   -Follow-Up in [facility] in 2 weeks  -Hospitalist co-management

## 2019-06-26 NOTE — PLAN OF CARE
Discharge Planner PT   Patient plan for discharge: Home   Current status: Bed Mobility: Independent   Transfers: SBA with FWW  Gait: Pt ambulated 200ft with FWW and SBA with 1 standing rest break. Pt steady and safe with walker however doesn't think she needs walker. Educated that it is recommend at first for safety.  C/o increased pain but moves well and unassisted   Barriers to return to prior living situation: None   Recommendations for discharge: Home with daughter to assist   Rationale for recommendations: PT evaluation s/p L PATRICK. Pt doing well. C/o increased pain during activity but was able to tolerate treatment well and only needed SBA or activity. Pt plans to discharge home with daughter to assist. Recommend walker for pt to take home but currently pt does not think she needs a walker. Plan to treat pt 2x/day during her stay. Pt follows up with Dr. Miranda in 2 weeks and will determine need for continued PT at that time.          Entered by: Na Alex 06/26/2019 12:38 PM

## 2019-06-26 NOTE — PLAN OF CARE
Face to face report given with opportunity to observe patient.    Report given to Denise Gonzalez   6/26/2019  3:14 PM

## 2019-06-26 NOTE — PLAN OF CARE
"Discharge Planner OT   Patient plan for discharge: Home with assist  Current status: SBA to modified independent with most ADLs and functional mobility aside from L LB dressing (donning sock and shoes). Pt able to with assistive device but currently does not have. Pt did say daughter can assist.   Barriers to return to prior living situation: None from OT perspective   Recommendations for discharge: Home with assist  Rationale for recommendations: Pt doing fairly well 1 day s/p L PATRICK. She was able to complete most ADLs and functional mobility with SBA to modified independence. Pt was unable to don her L sock without assistive device, however, pt does not have at home currently. Pt stated she was able to manage her shorts but not her shoes this morning. No concerns with pt's home accessibility. Pt did report her daughter is not enthusiastic about assisting her after this surgery as the previous one was a \"bad experience\" however pt did report she could don her socks. Will check in with pt in the morning to work on her shoes and then most likely discharge OT.        Entered by: Meme Mendoza 06/26/2019 1:19 PM     "

## 2019-06-26 NOTE — ANESTHESIA POSTPROCEDURE EVALUATION
Patient: Elizabeth Rankin    Procedure(s):  DIRECT ANTERIOR LEFT TOTAL HIP ARTHROPLASTY    Diagnosis:DJD LEFT HIP  Diagnosis Additional Information: No value filed.    Anesthesia Type:  Spinal    Note:  Anesthesia Post Evaluation    Patient location during evaluation: PACU, Floor and Bedside  Patient participation: Able to fully participate in evaluation  Level of consciousness: awake and alert  Pain management: adequate  Airway patency: patent  Cardiovascular status: acceptable  Respiratory status: acceptable  Hydration status: stable  PONV: none     Anesthetic complications: None          Last vitals:  Vitals:    06/25/19 1840 06/25/19 2041 06/26/19 0018   BP: 124/72 124/74 (!) 118/49   Resp: 17 16 16   Temp: 99  F (37.2  C) 98.3  F (36.8  C) 97.6  F (36.4  C)   SpO2: 93% 95% 97%         Electronically Signed By: Giorgio Castellanos MD  June 26, 2019  6:50 AM

## 2019-06-27 ENCOUNTER — APPOINTMENT (OUTPATIENT)
Dept: PHYSICAL THERAPY | Facility: HOSPITAL | Age: 54
DRG: 470 | End: 2019-06-27
Attending: ORTHOPAEDIC SURGERY
Payer: MEDICARE

## 2019-06-27 ENCOUNTER — APPOINTMENT (OUTPATIENT)
Dept: OCCUPATIONAL THERAPY | Facility: HOSPITAL | Age: 54
DRG: 470 | End: 2019-06-27
Attending: ORTHOPAEDIC SURGERY
Payer: MEDICARE

## 2019-06-27 VITALS
TEMPERATURE: 99.3 F | RESPIRATION RATE: 18 BRPM | DIASTOLIC BLOOD PRESSURE: 82 MMHG | SYSTOLIC BLOOD PRESSURE: 135 MMHG | OXYGEN SATURATION: 95 %

## 2019-06-27 LAB
COPATH REPORT: NORMAL
GLUCOSE BLDC GLUCOMTR-MCNC: 118 MG/DL (ref 70–99)
GLUCOSE BLDC GLUCOMTR-MCNC: 146 MG/DL (ref 70–99)
GLUCOSE SERPL-MCNC: 130 MG/DL (ref 70–99)
HGB BLD-MCNC: 10.2 G/DL (ref 11.7–15.7)

## 2019-06-27 PROCEDURE — 82947 ASSAY GLUCOSE BLOOD QUANT: CPT | Performed by: ORTHOPAEDIC SURGERY

## 2019-06-27 PROCEDURE — 97530 THERAPEUTIC ACTIVITIES: CPT | Mod: GP

## 2019-06-27 PROCEDURE — 85018 HEMOGLOBIN: CPT | Performed by: ORTHOPAEDIC SURGERY

## 2019-06-27 PROCEDURE — 00000146 ZZHCL STATISTIC GLUCOSE BY METER IP

## 2019-06-27 PROCEDURE — 99231 SBSQ HOSP IP/OBS SF/LOW 25: CPT | Performed by: INTERNAL MEDICINE

## 2019-06-27 PROCEDURE — 25000132 ZZH RX MED GY IP 250 OP 250 PS 637: Mod: GY | Performed by: NURSE PRACTITIONER

## 2019-06-27 PROCEDURE — 97530 THERAPEUTIC ACTIVITIES: CPT | Mod: GO

## 2019-06-27 PROCEDURE — 25000125 ZZHC RX 250: Performed by: ORTHOPAEDIC SURGERY

## 2019-06-27 PROCEDURE — 36415 COLL VENOUS BLD VENIPUNCTURE: CPT | Performed by: ORTHOPAEDIC SURGERY

## 2019-06-27 PROCEDURE — 40000275 ZZH STATISTIC RCP TIME EA 10 MIN

## 2019-06-27 PROCEDURE — 25000132 ZZH RX MED GY IP 250 OP 250 PS 637: Mod: GY | Performed by: ORTHOPAEDIC SURGERY

## 2019-06-27 RX ADMIN — HYDROCODONE BITARTRATE AND ACETAMINOPHEN 2 TABLET: 5; 325 TABLET ORAL at 11:30

## 2019-06-27 RX ADMIN — MODAFINIL 100 MG: 100 TABLET ORAL at 07:32

## 2019-06-27 RX ADMIN — HYDROCODONE BITARTRATE AND ACETAMINOPHEN 2 TABLET: 5; 325 TABLET ORAL at 07:31

## 2019-06-27 RX ADMIN — CYCLOBENZAPRINE HYDROCHLORIDE 10 MG: 10 TABLET, FILM COATED ORAL at 11:30

## 2019-06-27 RX ADMIN — METFORMIN HYDROCHLORIDE 1000 MG: 1000 TABLET ORAL at 07:32

## 2019-06-27 RX ADMIN — ASPIRIN 325 MG: 325 TABLET, COATED ORAL at 07:32

## 2019-06-27 RX ADMIN — SENNOSIDES AND DOCUSATE SODIUM 1 TABLET: 8.6; 5 TABLET ORAL at 07:32

## 2019-06-27 ASSESSMENT — ACTIVITIES OF DAILY LIVING (ADL)
ADLS_ACUITY_SCORE: 12
ADLS_ACUITY_SCORE: 13
ADLS_ACUITY_SCORE: 13
ADLS_ACUITY_SCORE: 12

## 2019-06-27 NOTE — PLAN OF CARE
Temp: 98.8  F (37.1  C) Temp src: Tympanic BP: 111/52   Heart Rate: 74 Resp: 16 SpO2: 97 % O2 Device: None (Room air)      A&O, c/o L hip pain rating 6-7/10 decrease in pain to a tolerable 3 with 2 Norco's given twice at 1756 & 2230, pt stated that norco's worked well for pain control, no adverse reactions. Ambulated hallway, tolerated well. L hip dressing CDI, good CMS.  LS clear, saline locked. BG 99 & 107 this shift good oral intake this shift.  Pt stated that she felt very tired this shift, resting on and off all shift.      Face to face report given with opportunity to observe patient.    Report given to Viviana Maier   6/26/2019  11:27 PM

## 2019-06-27 NOTE — PLAN OF CARE
Pt is A&O, regular diet, SBA w/ walker.  VS /50 HR 75 RR 16 T (max) 100.8, re-check 98.4, O2 94% on RA.  Pt c/o left hip pain upon initial assessment along with being anxious, chilled and nauseated.  Vitals obtained, along with blood sugar at this time.  Administered IV Toradol and po Zofran w/ relief.  Lungs are CTA, bowels active x4.  CMS intact.  Dressing to hip is CDI, scant shadowing noted in a couple of pea-sized areas on Aquacell. Ice applied to left hip for comfort.  BG at 0200 was 146.  IV is SL and flushes well.  Brakes locked, call light within reach, makes needs known.  Frequent rounding done to assess needs, free from falls.      Face to face report given with opportunity to observe patient.    Report given to TAVON Damon   6/27/2019  5:15 AM

## 2019-06-27 NOTE — PROGRESS NOTES
Name: Elizabeth Rankin    MRN#: [id number]    Reason for Hospitalization: DJD LEFT HIP  Status post right hip replacement    KAREN: low    Discharge Date: 6/27/2019    Medicare IM letter reviewed with her yesterday    Patient / Family response to discharge plan: she understands and agrees    Follow-Up Appt:   Future Appointments   Date Time Provider Department Center   8/20/2019  9:15 AM Ursula Mcdowell, [facility] Imelda Matos       Other Providers (Care Coordinator, County Services, PCA services etc): No    Discharge Disposition: home via family/friend      Nishi Mccall RN

## 2019-06-27 NOTE — DISCHARGE INSTRUCTIONS
Appointment with   Wednesday, July 10, 2019 @ 9:45  With Jeremías TORRES  [facility]  [phone]    *[facility]  is Yazmin, you can reach her at [phone] if needs arise after discharge.

## 2019-06-27 NOTE — PLAN OF CARE
Reason for hospital stay:  S/P L TKA  Most recent vitals: /82   Temp 99.3  F (37.4  C) (Tympanic)   Resp 18   LMP 06/06/2019   SpO2 95%   Pain Management:  Norco given x2 et Flexeril given x1 with good relief  Orientation:  A/O  Cardiac:  WDL  Respiratory:  Lung sounds clear bilat  GI:  Bowel sounds audible et active x4  :  WDL  Skin Issues:  Aquacel dressing CDI  IVF:  Saline locked, removed for discharge  ABX:  n/a  Mobility:  Independent  Safety:  A/O steady on feet    Comments:      6/27/2019  1:17 PM  Marisol Da Silva

## 2019-06-27 NOTE — PLAN OF CARE
Discharge Planner OT   Patient plan for discharge: Home with assist  Current status: Mod (I) for bed mobility and doffing/donning sneakers  Barriers to return to prior living situation: None from OT perspective  Recommendations for discharge: Home with assist  Rationale for recommendations: Pt having very minimal to no difficulties with ADLs/functional mobility. She might need assistance with donning her L sock however pt either won't wear or her daughter will help. No further skilled OT needs identified at this time.        Entered by: Meme Mendoza 06/27/2019 11:47 AM       Occupational Therapy Discharge Summary    Reason for therapy discharge:    All goals and outcomes met, no further needs identified.    Progress towards therapy goal(s). See goals on Care Plan in [facility] electronic health record for goal details.  Goals met    Therapy recommendation(s):    No further therapy is recommended.

## 2019-06-27 NOTE — PROGRESS NOTES
[facility]    Hospitalist Progress Note    Date of Service (when I saw the patient): 06/27/2019    Assessment & Plan     Status post right hip replacement POD #2: Doing well, pain and anxiety regarding pain a problem. However, per therapy she is doing very well.     DM type 2 (diabetes mellitus, type 2) (H): Continue sliding scale and QID glucose checks.  Blood sugars have been good in the  range for the past 24 hours.   Invokana non-formulary and she cannot have anybody bring hers in, so will hold for now.  Continue metformin.     Hypersomnolence disorder: Provigil ordered as at home.     Menorrhagia with irregular cycle: Currently on medroxyprogesterone for recently diagnosed endometrial hyperplasia.      Bipolar disorder (H): Not currently on any long term psychiatric medications.     DVT Prophylaxis: Defer to primary service-ASA per orthopedics  Code Status: Full Code    Disposition: Expected discharge once approved by orthopedics. Medically stable for discharge.        Interval History   Patient seen at bedside, doing well.  Currently feeling well, but had Tmax of 100.8 with chills overnight.  Denies any sob, coughing, denies dysuria.  Operative pain better controlled with norco versus dilaudid.     -Data reviewed today: I reviewed all new labs and imaging results over the last 24 hours.     Physical Exam   Temp: 99.3  F (37.4  C) Temp src: Tympanic BP: 135/82   Heart Rate: 77 Resp: 18 SpO2: 95 % O2 Device: None (Room air)    There were no vitals filed for this visit.  Vital Signs with Ranges  Temp:  [98.1  F (36.7  C)-100.8  F (38.2  C)] 99.3  F (37.4  C)  Heart Rate:  [69-84] 77  Resp:  [15-22] 18  BP: (108-151)/(42-82) 135/82  SpO2:  [94 %-98 %] 95 %  I/O last 3 completed shifts:  In: 720 [P.O.:720]  Out: -     Peripheral IV 06/25/19 Left Hand (Active)   Site Assessment WDL 6/27/2019 12:01 AM   Line Status Saline locked 6/27/2019 12:01 AM   Phlebitis Scale 0-->no symptoms 6/27/2019 12:01 AM    Infiltration Scale 0 6/27/2019 12:01 AM   Extravasation? No 6/27/2019 12:01 AM   Number of days: 2       Incision/Surgical Site 06/25/19 Left Hip (Active)   Incision Assessment [facility] 6/26/2019 11:50 PM   Terri-Incision Assessment [facility] 6/26/2019 11:50 PM   Closure LIANA 6/26/2019 11:50 PM   Incision Drainage Amount None 6/26/2019 11:50 PM   Drainage Description [facility] 6/26/2019 11:50 PM   Incision Care Ice applied 6/26/2019 11:50 PM   Dressing Intervention Clean, dry, intact 6/26/2019 11:50 PM   Number of days: 2     Line/device assessment(s) completed for medical necessity    Constitutional: Awake,alert, no acute distress  Respiratory: Clear bilaterally, no wheezes, crackles, rhonchi.  Cardiovascular: HRR, no murmurs, rubs, thrills.   GI: Soft, nontender, bowel sounds hypoactive.   Skin/Integumen: No open areas, bruising, rashes.   Other:  Bandage in place, clean dry and intake.     Medications     No Medication Sleep Aids for this Patient       sodium chloride Stopped (06/26/19 0324)       aspirin  325 mg Oral BID     insulin aspart  1-7 Units Subcutaneous TID AC     insulin aspart  1-5 Units Subcutaneous At Bedtime     medroxyPROGESTERone  10 mg Oral Daily     metFORMIN  1,000 mg Oral BID w/meals     modafinil  100 mg Oral Daily     scopolamine   Transdermal Once     senna-docusate  1 tablet Oral BID    Or     senna-docusate  2 tablet Oral BID     sodium chloride (PF)  3 mL Intracatheter Q8H       Data   Recent Labs   Lab 06/27/19  0520 06/26/19  0526   WBC  --  12.5*   HGB 10.2* 11.0*   MCV  --  82   PLT  --  309   NA  --  138   POTASSIUM  --  3.7   CHLORIDE  --  108   CO2  --  24   BUN  --  11   CR  --  0.56   ANIONGAP  --  6   ALYSSA  --  8.6   * 117*       No results found for this or any previous visit (from the past 24 hour(s)).

## 2019-06-27 NOTE — PROGRESS NOTES
Patient discharged at 1:36 PM via wheel chair accompanied by other:friend and staff. Prescriptions sent with patient to fill . All belongings sent with patient.     Discharge instructions reviewed with patient. Listed belongings gathered and returned to patient. yes    Patient discharged to home.   Report called to Nursing Home:  n/a    Core Measures and Vaccines  Core Measures applicable during stay: No. If yes, state diagnosis: n/a  Pneumonia and Influenza given prior to discharge, if indicated: N/A    Surgical Patient   Surgical Procedures during stay: yes  Did patient receive discharge instruction on wound care and recognition of infection symptoms? Yes    MISC  Follow up appointment made:  Yes  Home and hospital aquired medications returned to patient: N/A  Patient reports pain was well managed at discharge: Yes

## 2019-07-02 ENCOUNTER — HOSPITAL ENCOUNTER (EMERGENCY)
Facility: HOSPITAL | Age: 54
Discharge: HOME | End: 2019-07-02
Attending: NURSE PRACTITIONER | Admitting: NURSE PRACTITIONER
Payer: MEDICARE

## 2019-07-02 VITALS
DIASTOLIC BLOOD PRESSURE: 99 MMHG | TEMPERATURE: 98.4 F | OXYGEN SATURATION: 99 % | SYSTOLIC BLOOD PRESSURE: 150 MMHG | RESPIRATION RATE: 16 BRPM

## 2019-07-02 DIAGNOSIS — G89.18 ACUTE POST-OPERATIVE PAIN: ICD-10-CM

## 2019-07-02 DIAGNOSIS — Z76.0 ENCOUNTER FOR MEDICATION REFILL: ICD-10-CM

## 2019-07-02 PROCEDURE — G0463 HOSPITAL OUTPT CLINIC VISIT: HCPCS

## 2019-07-02 PROCEDURE — 99024 POSTOP FOLLOW-UP VISIT: CPT | Mod: Z6 | Performed by: NURSE PRACTITIONER

## 2019-07-02 PROCEDURE — 25000132 ZZH RX MED GY IP 250 OP 250 PS 637: Mod: GY | Performed by: NURSE PRACTITIONER

## 2019-07-02 PROCEDURE — 25000131 ZZH RX MED GY IP 250 OP 636 PS 637: Mod: GY | Performed by: NURSE PRACTITIONER

## 2019-07-02 PROCEDURE — 40000268 ZZH STATISTIC NO CHARGES

## 2019-07-02 RX ORDER — ONDANSETRON 4 MG/1
4-8 TABLET, ORALLY DISINTEGRATING ORAL EVERY 8 HOURS PRN
Qty: 20 TABLET | Refills: 1 | Status: ON HOLD | OUTPATIENT
Start: 2019-07-02 | End: 2019-08-05

## 2019-07-02 RX ORDER — HYDROCODONE BITARTRATE AND ACETAMINOPHEN 5; 325 MG/1; MG/1
1-2 TABLET ORAL EVERY 4 HOURS PRN
Qty: 20 TABLET | Refills: 0 | Status: ON HOLD | OUTPATIENT
Start: 2019-07-02 | End: 2019-08-05

## 2019-07-02 RX ORDER — HYDROCODONE BITARTRATE AND ACETAMINOPHEN 5; 325 MG/1; MG/1
2 TABLET ORAL ONCE
Status: COMPLETED | OUTPATIENT
Start: 2019-07-02 | End: 2019-07-02

## 2019-07-02 RX ORDER — ONDANSETRON 4 MG/1
4 TABLET, ORALLY DISINTEGRATING ORAL ONCE
Status: COMPLETED | OUTPATIENT
Start: 2019-07-02 | End: 2019-07-02

## 2019-07-02 RX ADMIN — ONDANSETRON 4 MG: 4 TABLET, ORALLY DISINTEGRATING ORAL at 11:41

## 2019-07-02 RX ADMIN — HYDROCODONE BITARTRATE AND ACETAMINOPHEN 2 TABLET: 5; 325 TABLET ORAL at 11:42

## 2019-07-02 NOTE — ED TRIAGE NOTES
Pt presents with left hip pain since last night when she moved her left leg wrong causing increased pain. Tylenol taken this morning at 0600.

## 2019-07-02 NOTE — ED AVS SNAPSHOT
[facility]  [address] [address]  Phone:  [phone]                                    Elizabeth Rankin   MRN: [id number]    Department:  [facility]   Date of Visit:  7/2/2019           After Visit Summary Signature Page    I have received my discharge instructions, and my questions have been answered. I have discussed any challenges I see with this plan with the nurse or doctor.    ..........................................................................................................................................  Patient/Patient Representative Signature      ..........................................................................................................................................  Patient Representative Print Name and Relationship to Patient    ..................................................               ................................................  Date                                   Time    ..........................................................................................................................................  Reviewed by Signature/Title    ...................................................              ..............................................  Date                                               Time          22EPIC Rev 08/18

## 2019-07-02 NOTE — ED PROVIDER NOTES
History     Chief Complaint   Patient presents with     Hip Pain     right hip replacement this past Tuesday     HPI  Elizabeth Rankin is a 54 year old female who presents today with a CC of left hip pain.  She had a hip replacement done by Dr Miranda on Tuesday 6/25/19.  She has been vomiting after taking the hydrocodone all weekend. She has been taking 2 tablets every 4 hours for pain. She is currently out of pain medications.  Last night she abducted her left leg and felt an increased in pain.  She had pain in her groin.  The last hydrocodone she took was around 12 hours.  She has only taken tylenol since.      Allergies:  Allergies   Allergen Reactions     Dulaglutide Other (See Comments)     confusion     Erythromycin Hives     Able to tolerate Zithromax:  Allergy     Januvia [Sitagliptin]      Joint pain, nausea     Meloxicam Swelling     Swelling of lower extremeties; chest pain     Oxycodone      Suppressed respirations     Percocet [Oxycodone-Acetaminophen] Itching     Ritalin [Methylphenidate] Other (See Comments)     Over stimulation     Ritalin [Methylphenidate]      Tramadol      sedation     Tylenol With Codeine [Acetaminophen-Codeine] Nausea     Vistaril [Hydroxyzine]      Dizzy, ankle swelling       Vortioxetine Nausea     Headache, neck pain     Welchol [Colesevelam]      Joint pain, muscle weakness     Strattera [Atomoxetine] Anxiety     Valium [Diazepam] Rash       Problem List:    Patient Active Problem List    Diagnosis Date Noted     Hypersomnolence disorder 06/26/2019     Priority: Medium     Menorrhagia with irregular cycle 06/26/2019     Priority: Medium     Bipolar disorder (H) 06/26/2019     Priority: Medium     Spondylolisthesis of lumbar region 06/26/2019     Priority: Medium     Elevated LDL cholesterol level 06/26/2019     Priority: Medium     HLA B27 (HLA B27 positive) 06/26/2019     Priority: Medium     Status post right hip replacement 06/25/2019     Priority: Medium     Lumbar  spondylosis 04/21/2017     Priority: Medium     ACP (advance care planning) 07/27/2016     Priority: Medium     Advance Care Planning 7/27/2016: ACP Review of Chart / Resources Provided:  Reviewed chart for advance care plan.  Elizabeth Rankin has no plan or code status on file. Discussed available resources and provided with information. Confirmed code status reflects current choices pending further ACP discussions.  Confirmed/documented legally designated decision makers.  Added by PRABHA LOYA             DM type 2 (diabetes mellitus, type 2) (H) 09/03/2013     Priority: Medium     Depression 09/03/2013     Priority: Medium        Past Medical History:    Past Medical History:   Diagnosis Date     Bicuspid aortic valve      Bicuspid aortic valve      Bipolar disorder (H) 6/26/2019     Depression      DM type 2 (diabetes mellitus, type 2) (H)      Elevated LDL cholesterol level 6/26/2019     HLA B27 (HLA B27 positive) 6/26/2019     Hyperlipidemia      Hypersomnolence disorder 6/26/2019     Menorrhagia with irregular cycle 6/26/2019     Nondependent alcohol abuse, unspecified drinking b 1/2/2001     PONV (postoperative nausea and vomiting)      Spondylolisthesis of lumbar region 6/26/2019     Status post right hip replacement 6/25/2019       Past Surgical History:    Past Surgical History:   Procedure Laterality Date     ARTHROPLASTY HIP ANTERIOR Left 6/25/2019    Procedure: DIRECT ANTERIOR LEFT TOTAL HIP ARTHROPLASTY;  Surgeon: Cirilo Miranda MD;  Location: [facility] OR     BIOPSY Right 10/30/2013    right breast biopsy; fibroadenoma     ORTHOPEDIC SURGERY  07/2014    Right total hip Dr. Meño Sullivan      ORTHOPEDIC SURGERY Right 06/16/2015    Right hip psoas release     ORTHOPEDIC SURGERY Left 09/2015    left hip labrum debridement Dr. Meño Sullivan      TUBAL LIGATION         Family History:    Family History   Problem Relation Age of Onset     Depression Sister      Ankylosing Spondylitis Sister       Diabetes Mother      Hypertension Mother      Ankylosing Spondylitis Mother      Diabetes Father        Social History:  Marital Status:   [5]  Social History     Tobacco Use     Smoking status: Former Smoker     Packs/day: 1.00     Years: 25.00     Pack years: 25.00     Types: Cigarettes     Last attempt to quit:      Years since quittin.5     Smokeless tobacco: Never Used   Substance Use Topics     Alcohol use: Yes     Comment: formerly: 2 beers weekly     Drug use: Not on file        Medications:      acetaminophen (TYLENOL) 325 MG tablet   adalimumab (HUMIRA) 40 MG/0.8ML prefilled syringe kit   aspirin (ASA) 325 MG EC tablet   blood glucose monitoring (ACCU-CHEK SMARTVIEW) test strip   canaliflozin (INVOKANA) tablet   cephALEXin (KEFLEX) 500 MG capsule   cholecalciferol (VITAMIN D3) 66770 units (1250 mcg) capsule   HYDROcodone-acetaminophen (NORCO) 5-325 MG tablet   insulin pen needle 32G X 4 MM   medroxyPROGESTERone (PROVERA) 10 MG tablet   metFORMIN (GLUCOPHAGE) 1000 MG tablet   modafinil (PROVIGIL) 100 MG tablet   ondansetron (ZOFRAN ODT) 4 MG ODT tab   vitamin B-Complex         Review of Systems   Constitutional: Negative for appetite change, chills, fatigue and fever.   Gastrointestinal: Positive for nausea.   Musculoskeletal: Positive for arthralgias.       Physical Exam   BP: 150/99  Heart Rate: 107  Temp: 98.4  F (36.9  C)  Resp: 16  SpO2: 99 %      Physical Exam   Constitutional: She is oriented to person, place, and time. She appears well-developed.   HENT:   Head: Normocephalic and atraumatic.   Pulmonary/Chest: Effort normal.   Musculoskeletal: Normal range of motion.   Left hip: Surgical dressing intact, there is a shadow of blood under the dressing.  The exposed skin is without erythema, edema, or ecchymosis, the skin is normothermic.  She has good flexion and extension of the leg.  She ambulates well with a walker, gait is steady.  Pedal pulse 2+, sensation is grossly intact to  light touch.     Neurological: She is alert and oriented to person, place, and time.   Skin: Skin is warm and dry.   Psychiatric: Her mood appears anxious.   Nursing note and vitals reviewed.      ED Course        Procedures    Medications   HYDROcodone-acetaminophen (NORCO) 5-325 MG per tablet 2 tablet (2 tablets Oral Given 7/2/19 1142)   ondansetron (ZOFRAN-ODT) ODT tab 4 mg (4 mg Oral Given 7/2/19 1141)       Assessments & Plan (with Medical Decision Making)     I have reviewed the nursing notes.    I have reviewed the findings, diagnosis, plan and need for follow up with the patient.  Dr Miranda ok'd refill for 20 Lortab  ASSESSMENT / PLAN:  (G89.18) Acute post-operative pain  Comment: out of pain medications, was getting nauseated from them, will give Zofran with Lortab  Plan: Lortab as prescribed  Continue to follow post op directions per Dr Miranda    (Z76.0) Encounter for medication refill         Medication List      Started    ondansetron 4 MG ODT tab  Commonly known as:  ZOFRAN ODT  4-8 mg, Oral, EVERY 8 HOURS PRN        Modified    HYDROcodone-acetaminophen 5-325 MG tablet  Commonly known as:  NORCO  1-2 tablets, Oral, EVERY 4 HOURS PRN  What changed:  reasons to take this            Final diagnoses:   Acute post-operative pain   Encounter for medication refill       7/2/2019   [facility]     Arianna Barrera NP  07/05/19 9511

## 2019-07-02 NOTE — ED TRIAGE NOTES
Left hip pain.  Had hip replacement last Tuesday by .  No fevers at home.  Pt was taking hydrocodone for pain but get nauseated and vomits after taking them, pt is out of them currently

## 2019-07-05 ASSESSMENT — ENCOUNTER SYMPTOMS
CHILLS: 0
NAUSEA: 1
ARTHRALGIAS: 1
APPETITE CHANGE: 0
FEVER: 0
FATIGUE: 0

## 2019-07-15 ENCOUNTER — TELEPHONE (OUTPATIENT)
Dept: CASE MANAGEMENT | Facility: HOSPITAL | Age: 54
End: 2019-07-15

## 2019-07-16 ENCOUNTER — TELEPHONE (OUTPATIENT)
Dept: CASE MANAGEMENT | Facility: HOSPITAL | Age: 54
End: 2019-07-16

## 2019-07-16 NOTE — TELEPHONE ENCOUNTER
Elizabeth Rankin, was discharged to home on 6/27/19  from [facility]. I spoke today with her regarding her  discharge.   She indicates no one went over her discharge instructions  with her . She got a lot of papers that were placed in her bag to go home and she had to figure out her discharge instructions on her own. She had to go to the ED on 7/2 because she ran out of pain meds. She stated she discharged on a Thurs and there should have been enough pain meds to get her through the weekend. She stated she had to ask 3 times before someone showed her how to properly use her walker.   It was documented in the record that the discharge  instructions were reviewed with the patient.   She indicates she already went to a follow up appointment on 710/19.   Per their request, the following employee (s) can be recognized for their outstanding services delivered:  She stated staff was very nice .   Suggestions to improve service: Need enough pain meds to get through the weekend and a better job going over the discharge instructions.   She was informed she  may receive a survey in the mail from the hospital. Asked if she  would kindly complete the survey in order for [facility] to know if services fully met patient needs.

## 2019-07-23 DIAGNOSIS — H91.93 DECREASED HEARING OF BOTH EARS: Primary | ICD-10-CM

## 2019-08-05 ENCOUNTER — APPOINTMENT (OUTPATIENT)
Dept: GENERAL RADIOLOGY | Facility: HOSPITAL | Age: 54
DRG: 561 | End: 2019-08-05
Attending: FAMILY MEDICINE
Payer: MEDICARE

## 2019-08-05 ENCOUNTER — HOSPITAL ENCOUNTER (INPATIENT)
Facility: HOSPITAL | Age: 54
LOS: 2 days | Discharge: HOME | DRG: 561 | End: 2019-08-07
Attending: FAMILY MEDICINE | Admitting: INTERNAL MEDICINE
Payer: MEDICARE

## 2019-08-05 DIAGNOSIS — S73.005A CLOSED DISLOCATION OF LEFT HIP, INITIAL ENCOUNTER (H): ICD-10-CM

## 2019-08-05 DIAGNOSIS — S73.005A DISLOCATION OF LEFT HIP, INITIAL ENCOUNTER (H): Primary | ICD-10-CM

## 2019-08-05 DIAGNOSIS — S73.015A: ICD-10-CM

## 2019-08-05 LAB
BASOPHILS # BLD AUTO: 0.1 10E9/L (ref 0–0.2)
BASOPHILS NFR BLD AUTO: 0.7 %
DIFFERENTIAL METHOD BLD: ABNORMAL
EOSINOPHIL # BLD AUTO: 0.1 10E9/L (ref 0–0.7)
EOSINOPHIL NFR BLD AUTO: 0.6 %
ERYTHROCYTE [DISTWIDTH] IN BLOOD BY AUTOMATED COUNT: 14.5 % (ref 10–15)
GLUCOSE BLDC GLUCOMTR-MCNC: 108 MG/DL (ref 70–99)
GLUCOSE BLDC GLUCOMTR-MCNC: 90 MG/DL (ref 70–99)
HCT VFR BLD AUTO: 34.7 % (ref 35–47)
HGB BLD-MCNC: 11.1 G/DL (ref 11.7–15.7)
IMM GRANULOCYTES # BLD: 0.1 10E9/L (ref 0–0.4)
IMM GRANULOCYTES NFR BLD: 0.5 %
LYMPHOCYTES # BLD AUTO: 2.2 10E9/L (ref 0.8–5.3)
LYMPHOCYTES NFR BLD AUTO: 16.8 %
MCH RBC QN AUTO: 26.2 PG (ref 26.5–33)
MCHC RBC AUTO-ENTMCNC: 32 G/DL (ref 31.5–36.5)
MCV RBC AUTO: 82 FL (ref 78–100)
MONOCYTES # BLD AUTO: 0.7 10E9/L (ref 0–1.3)
MONOCYTES NFR BLD AUTO: 5.6 %
NEUTROPHILS # BLD AUTO: 10 10E9/L (ref 1.6–8.3)
NEUTROPHILS NFR BLD AUTO: 75.8 %
NRBC # BLD AUTO: 0 10*3/UL
NRBC BLD AUTO-RTO: 0 /100
PLATELET # BLD AUTO: 440 10E9/L (ref 150–450)
RBC # BLD AUTO: 4.23 10E12/L (ref 3.8–5.2)
WBC # BLD AUTO: 13.2 10E9/L (ref 4–11)

## 2019-08-05 PROCEDURE — 12000000 ZZH R&B MED SURG/OB

## 2019-08-05 PROCEDURE — 25000128 H RX IP 250 OP 636: Performed by: FAMILY MEDICINE

## 2019-08-05 PROCEDURE — 85025 COMPLETE CBC W/AUTO DIFF WBC: CPT | Performed by: FAMILY MEDICINE

## 2019-08-05 PROCEDURE — 25000132 ZZH RX MED GY IP 250 OP 250 PS 637: Mod: GY | Performed by: INTERNAL MEDICINE

## 2019-08-05 PROCEDURE — 99222 1ST HOSP IP/OBS MODERATE 55: CPT | Mod: AI | Performed by: INTERNAL MEDICINE

## 2019-08-05 PROCEDURE — 99285 EMERGENCY DEPT VISIT HI MDM: CPT | Mod: 25

## 2019-08-05 PROCEDURE — 00000146 ZZHCL STATISTIC GLUCOSE BY METER IP

## 2019-08-05 PROCEDURE — 27250 TREAT HIP DISLOCATION: CPT | Mod: 54 | Performed by: FAMILY MEDICINE

## 2019-08-05 PROCEDURE — 25000125 ZZHC RX 250: Performed by: FAMILY MEDICINE

## 2019-08-05 PROCEDURE — 40000275 ZZH STATISTIC RCP TIME EA 10 MIN

## 2019-08-05 PROCEDURE — 25000128 H RX IP 250 OP 636: Performed by: INTERNAL MEDICINE

## 2019-08-05 PROCEDURE — 73502 X-RAY EXAM HIP UNI 2-3 VIEWS: CPT | Mod: TC

## 2019-08-05 PROCEDURE — 36415 COLL VENOUS BLD VENIPUNCTURE: CPT | Performed by: FAMILY MEDICINE

## 2019-08-05 PROCEDURE — 27250 TREAT HIP DISLOCATION: CPT | Mod: LT

## 2019-08-05 RX ORDER — DEXTROSE MONOHYDRATE 25 G/50ML
25-50 INJECTION, SOLUTION INTRAVENOUS
Status: DISCONTINUED | OUTPATIENT
Start: 2019-08-05 | End: 2019-08-07 | Stop reason: HOSPADM

## 2019-08-05 RX ORDER — MEDROXYPROGESTERONE ACETATE 5 MG
10 TABLET ORAL DAILY
Status: DISCONTINUED | OUTPATIENT
Start: 2019-08-06 | End: 2019-08-07 | Stop reason: HOSPADM

## 2019-08-05 RX ORDER — HYDROMORPHONE HYDROCHLORIDE 1 MG/ML
0.5 INJECTION, SOLUTION INTRAMUSCULAR; INTRAVENOUS; SUBCUTANEOUS
Status: DISCONTINUED | OUTPATIENT
Start: 2019-08-05 | End: 2019-08-05

## 2019-08-05 RX ORDER — CYCLOBENZAPRINE HCL 10 MG
10 TABLET ORAL 3 TIMES DAILY
Status: DISCONTINUED | OUTPATIENT
Start: 2019-08-05 | End: 2019-08-07 | Stop reason: HOSPADM

## 2019-08-05 RX ORDER — CELECOXIB 200 MG/1
200 CAPSULE ORAL DAILY
COMMUNITY

## 2019-08-05 RX ORDER — HYDROMORPHONE HYDROCHLORIDE 1 MG/ML
0.5 INJECTION, SOLUTION INTRAMUSCULAR; INTRAVENOUS; SUBCUTANEOUS
Status: DISCONTINUED | OUTPATIENT
Start: 2019-08-05 | End: 2019-08-07 | Stop reason: HOSPADM

## 2019-08-05 RX ORDER — NALOXONE HYDROCHLORIDE 0.4 MG/ML
.1-.4 INJECTION, SOLUTION INTRAMUSCULAR; INTRAVENOUS; SUBCUTANEOUS
Status: DISCONTINUED | OUTPATIENT
Start: 2019-08-05 | End: 2019-08-05

## 2019-08-05 RX ORDER — ONDANSETRON 4 MG/1
4 TABLET, ORALLY DISINTEGRATING ORAL EVERY 6 HOURS PRN
Status: DISCONTINUED | OUTPATIENT
Start: 2019-08-05 | End: 2019-08-07 | Stop reason: HOSPADM

## 2019-08-05 RX ORDER — MODAFINIL 100 MG/1
100 TABLET ORAL DAILY
Status: DISCONTINUED | OUTPATIENT
Start: 2019-08-06 | End: 2019-08-07 | Stop reason: HOSPADM

## 2019-08-05 RX ORDER — HYDROCODONE BITARTRATE AND ACETAMINOPHEN 10; 325 MG/1; MG/1
1 TABLET ORAL EVERY 6 HOURS
Status: DISCONTINUED | OUTPATIENT
Start: 2019-08-05 | End: 2019-08-07 | Stop reason: HOSPADM

## 2019-08-05 RX ORDER — KETOROLAC TROMETHAMINE 30 MG/ML
30 INJECTION, SOLUTION INTRAMUSCULAR; INTRAVENOUS EVERY 6 HOURS PRN
Status: DISCONTINUED | OUTPATIENT
Start: 2019-08-05 | End: 2019-08-07 | Stop reason: HOSPADM

## 2019-08-05 RX ORDER — DIPHENHYDRAMINE HYDROCHLORIDE 50 MG/ML
25 INJECTION INTRAMUSCULAR; INTRAVENOUS ONCE
Status: COMPLETED | OUTPATIENT
Start: 2019-08-05 | End: 2019-08-05

## 2019-08-05 RX ORDER — ETOMIDATE 2 MG/ML
20 INJECTION INTRAVENOUS ONCE
Status: COMPLETED | OUTPATIENT
Start: 2019-08-05 | End: 2019-08-05

## 2019-08-05 RX ORDER — HYDROXYZINE HYDROCHLORIDE 10 MG/1
10 TABLET, FILM COATED ORAL EVERY 6 HOURS PRN
Status: DISCONTINUED | OUTPATIENT
Start: 2019-08-05 | End: 2019-08-06

## 2019-08-05 RX ORDER — AMOXICILLIN 250 MG
2 CAPSULE ORAL 2 TIMES DAILY
Status: DISCONTINUED | OUTPATIENT
Start: 2019-08-05 | End: 2019-08-07 | Stop reason: HOSPADM

## 2019-08-05 RX ORDER — LIDOCAINE 40 MG/G
CREAM TOPICAL
Status: DISCONTINUED | OUTPATIENT
Start: 2019-08-05 | End: 2019-08-07 | Stop reason: HOSPADM

## 2019-08-05 RX ORDER — ONDANSETRON 2 MG/ML
4 INJECTION INTRAMUSCULAR; INTRAVENOUS EVERY 6 HOURS PRN
Status: DISCONTINUED | OUTPATIENT
Start: 2019-08-05 | End: 2019-08-07 | Stop reason: HOSPADM

## 2019-08-05 RX ORDER — NALOXONE HYDROCHLORIDE 0.4 MG/ML
.1-.4 INJECTION, SOLUTION INTRAMUSCULAR; INTRAVENOUS; SUBCUTANEOUS
Status: DISCONTINUED | OUTPATIENT
Start: 2019-08-05 | End: 2019-08-07 | Stop reason: HOSPADM

## 2019-08-05 RX ORDER — NICOTINE POLACRILEX 4 MG
15-30 LOZENGE BUCCAL
Status: DISCONTINUED | OUTPATIENT
Start: 2019-08-05 | End: 2019-08-07 | Stop reason: HOSPADM

## 2019-08-05 RX ORDER — COVID-19 ANTIGEN TEST
880 KIT MISCELLANEOUS 2 TIMES DAILY WITH MEALS
COMMUNITY

## 2019-08-05 RX ORDER — AMOXICILLIN 250 MG
1 CAPSULE ORAL 2 TIMES DAILY
Status: DISCONTINUED | OUTPATIENT
Start: 2019-08-05 | End: 2019-08-07 | Stop reason: HOSPADM

## 2019-08-05 RX ADMIN — HYDROMORPHONE HYDROCHLORIDE 1 MG: 1 INJECTION, SOLUTION INTRAMUSCULAR; INTRAVENOUS; SUBCUTANEOUS at 19:23

## 2019-08-05 RX ADMIN — HYDROMORPHONE HYDROCHLORIDE 0.5 MG: 1 INJECTION, SOLUTION INTRAMUSCULAR; INTRAVENOUS; SUBCUTANEOUS at 21:49

## 2019-08-05 RX ADMIN — ENOXAPARIN SODIUM 40 MG: 40 INJECTION SUBCUTANEOUS at 20:56

## 2019-08-05 RX ADMIN — HYDROMORPHONE HYDROCHLORIDE 1 MG: 1 INJECTION, SOLUTION INTRAMUSCULAR; INTRAVENOUS; SUBCUTANEOUS at 17:05

## 2019-08-05 RX ADMIN — HYDROMORPHONE HYDROCHLORIDE 1 MG: 1 INJECTION, SOLUTION INTRAMUSCULAR; INTRAVENOUS; SUBCUTANEOUS at 14:45

## 2019-08-05 RX ADMIN — SENNOSIDES AND DOCUSATE SODIUM 1 TABLET: 8.6; 5 TABLET ORAL at 21:50

## 2019-08-05 RX ADMIN — DIPHENHYDRAMINE HYDROCHLORIDE 25 MG: 50 INJECTION, SOLUTION INTRAMUSCULAR; INTRAVENOUS at 16:42

## 2019-08-05 RX ADMIN — CYCLOBENZAPRINE HYDROCHLORIDE 10 MG: 10 TABLET, FILM COATED ORAL at 19:32

## 2019-08-05 RX ADMIN — KETOROLAC TROMETHAMINE 30 MG: 30 INJECTION, SOLUTION INTRAMUSCULAR at 19:32

## 2019-08-05 RX ADMIN — ETOMIDATE 20 MG: 2 INJECTION, SOLUTION INTRAVENOUS at 16:02

## 2019-08-05 ASSESSMENT — ENCOUNTER SYMPTOMS
DIARRHEA: 0
NAUSEA: 0
NERVOUS/ANXIOUS: 1
FATIGUE: 1
ABDOMINAL PAIN: 0
ACTIVITY CHANGE: 1
WEAKNESS: 1
VOMITING: 0
ARTHRALGIAS: 1
SHORTNESS OF BREATH: 0
NUMBNESS: 0
CONSTIPATION: 0

## 2019-08-05 ASSESSMENT — ACTIVITIES OF DAILY LIVING (ADL)
RETIRED_EATING: 0-->INDEPENDENT
FALL_HISTORY_WITHIN_LAST_SIX_MONTHS: NO
RETIRED_COMMUNICATION: 0-->UNDERSTANDS/COMMUNICATES WITHOUT DIFFICULTY
COGNITION: 0 - NO COGNITION ISSUES REPORTED
BATHING: 3-->ASSISTIVE EQUIPMENT AND PERSON
TOILETING: 3-->ASSISTIVE EQUIPMENT AND PERSON
TRANSFERRING: 3-->ASSISTIVE EQUIPMENT AND PERSON
SWALLOWING: 0-->SWALLOWS FOODS/LIQUIDS WITHOUT DIFFICULTY
AMBULATION: 3-->ASSISTIVE EQUIPMENT AND PERSON
WHICH_OF_THE_ABOVE_FUNCTIONAL_RISKS_HAD_A_RECENT_ONSET_OR_CHANGE?: AMBULATION;TRANSFERRING;TOILETING;BATHING;DRESSING
DRESS: 2-->ASSISTIVE PERSON
ADLS_ACUITY_SCORE: 26

## 2019-08-05 NOTE — ED PROVIDER NOTES
History     Chief Complaint   Patient presents with     Hip Pain     left hip replacement surgery on 6/25/19. States was sitting in chair and moved right leg when left hip felt like it moved and then had sharp pain     HPI  Elizabeth Rankin is a 54 year old female who presented to the emergency room with left hip shortening and rotation.  She had her hip replaced on June 25, was sitting on a chair with her hip at less than a 90 degree angle when she felt a pop and it was suddenly extremely painful.  She was brought to the emergency room by GISELL diallo and had been given 1 mg of Dilaudid with 1.25 of Versed and Zofran, however she was still uncomfortable after being moved to the [facility].  She has approximately 1 inch of shortening and the foot is externally rotated.  She denies any other pain.    Allergies:  Allergies   Allergen Reactions     Dulaglutide Other (See Comments)     confusion     Erythromycin Hives     Able to tolerate Zithromax:  Allergy     Januvia [Sitagliptin]      Joint pain, nausea     Meloxicam Swelling     Swelling of lower extremeties; chest pain     Oxycodone      Suppressed respirations     Percocet [Oxycodone-Acetaminophen] Itching     Ritalin [Methylphenidate] Other (See Comments)     Over stimulation     Ritalin [Methylphenidate]      Tramadol      sedation     Tylenol With Codeine [Acetaminophen-Codeine] Nausea     Vistaril [Hydroxyzine]      Dizzy, ankle swelling       Vortioxetine Nausea     Headache, neck pain     Welchol [Colesevelam]      Joint pain, muscle weakness     Strattera [Atomoxetine] Anxiety     Valium [Diazepam] Rash       Problem List:    Patient Active Problem List    Diagnosis Date Noted     Hypersomnolence disorder 06/26/2019     Priority: Medium     Menorrhagia with irregular cycle 06/26/2019     Priority: Medium     Bipolar disorder (H) 06/26/2019     Priority: Medium     Spondylolisthesis of lumbar region 06/26/2019     Priority: Medium     Elevated LDL cholesterol level  06/26/2019     Priority: Medium     HLA B27 (HLA B27 positive) 06/26/2019     Priority: Medium     Status post right hip replacement 06/25/2019     Priority: Medium     Lumbar spondylosis 04/21/2017     Priority: Medium     ACP (advance care planning) 07/27/2016     Priority: Medium     Advance Care Planning 7/27/2016: ACP Review of Chart / Resources Provided:  Reviewed chart for advance care plan.  Elizabeth Rankin has no plan or code status on file. Discussed available resources and provided with information. Confirmed code status reflects current choices pending further ACP discussions.  Confirmed/documented legally designated decision makers.  Added by PRABHA LOYA             DM type 2 (diabetes mellitus, type 2) (H) 09/03/2013     Priority: Medium     Depression 09/03/2013     Priority: Medium        Past Medical History:    Past Medical History:   Diagnosis Date     Bicuspid aortic valve      Bicuspid aortic valve      Bipolar disorder (H) 6/26/2019     Depression      DM type 2 (diabetes mellitus, type 2) (H)      Elevated LDL cholesterol level 6/26/2019     HLA B27 (HLA B27 positive) 6/26/2019     Hyperlipidemia      Hypersomnolence disorder 6/26/2019     Menorrhagia with irregular cycle 6/26/2019     Nondependent alcohol abuse, unspecified drinking b 1/2/2001     PONV (postoperative nausea and vomiting)      Spondylolisthesis of lumbar region 6/26/2019     Status post right hip replacement 6/25/2019       Past Surgical History:    Past Surgical History:   Procedure Laterality Date     ARTHROPLASTY HIP ANTERIOR Left 6/25/2019    Procedure: DIRECT ANTERIOR LEFT TOTAL HIP ARTHROPLASTY;  Surgeon: Cirilo Miranda MD;  Location: [facility] OR     BIOPSY Right 10/30/2013    right breast biopsy; fibroadenoma     ORTHOPEDIC SURGERY  07/2014    Right total hip Dr. Meño Sullivan      ORTHOPEDIC SURGERY Right 06/16/2015    Right hip psoas release     ORTHOPEDIC SURGERY Left 09/2015    left hip labrum debridement   Meño Alonzowater      TUBAL LIGATION         Family History:    Family History   Problem Relation Age of Onset     Depression Sister      Ankylosing Spondylitis Sister      Diabetes Mother      Hypertension Mother      Ankylosing Spondylitis Mother      Diabetes Father        Social History:  Marital Status:   [5]  Social History     Tobacco Use     Smoking status: Former Smoker     Packs/day: 1.00     Years: 25.00     Pack years: 25.00     Types: Cigarettes     Last attempt to quit:      Years since quittin.6     Smokeless tobacco: Never Used   Substance Use Topics     Alcohol use: Yes     Comment: formerly: 2 beers weekly     Drug use: Not on file        Medications:      acetaminophen (TYLENOL) 325 MG tablet   adalimumab (HUMIRA) 40 MG/0.8ML prefilled syringe kit   blood glucose monitoring (ACCU-CHEK SMARTVIEW) test strip   canaliflozin (INVOKANA) tablet   cholecalciferol (VITAMIN D3) 60017 units (1250 mcg) capsule   HYDROcodone-acetaminophen (NORCO) 5-325 MG tablet   insulin pen needle 32G X 4 MM   medroxyPROGESTERone (PROVERA) 10 MG tablet   metFORMIN (GLUCOPHAGE) 1000 MG tablet   modafinil (PROVIGIL) 100 MG tablet   ondansetron (ZOFRAN ODT) 4 MG ODT tab   vitamin B-Complex         Review of Systems   Constitutional: Positive for activity change and fatigue.   HENT: Negative.    Respiratory: Negative for shortness of breath.    Cardiovascular: Negative for chest pain.   Gastrointestinal: Negative for abdominal pain, constipation, diarrhea, nausea and vomiting.   Genitourinary: Negative.    Musculoskeletal: Positive for arthralgias.        Left hip pain   Neurological: Positive for weakness. Negative for numbness.   Psychiatric/Behavioral: The patient is nervous/anxious.        Physical Exam   BP: 141/72  Pulse: 85  Heart Rate: 77  Temp: 98.4  F (36.9  C)  Resp: 24  Weight: 107.5 kg (236 lb 14.4 oz)  SpO2: 99 %      Physical Exam   Constitutional: She is oriented to person, place, and time. She  appears well-developed and well-nourished. She appears distressed.   HENT:   Head: Normocephalic and atraumatic.   Neck: Normal range of motion. Neck supple.   Cardiovascular: Normal rate, regular rhythm and normal heart sounds.   No murmur heard.  Pulmonary/Chest: Effort normal and breath sounds normal. No respiratory distress.   Abdominal: Soft. Bowel sounds are normal. She exhibits no distension.   Musculoskeletal: She exhibits tenderness and deformity.        Left hip: She exhibits decreased range of motion and deformity.   Neurological: She is alert and oriented to person, place, and time.   Skin: Skin is warm and dry. Capillary refill takes less than 2 seconds.   Psychiatric: Her speech is normal. Judgment normal. Her mood appears anxious. Cognition and memory are normal.   Nursing note and vitals reviewed.      ED Course        [facility]    ED Orthopedic Injury Treatment - Lower Extremity  Date/Time: 8/5/2019 4:20 PM  Performed by: Rufina Mckinley MD  Authorized by: Rufina Mckinley MD     ED EVALUATION:      Assessment Time: 8/5/2019 3:30 PM      Provisional Diagnosis: Left hip dislocation    Difficult Airway?: No    ASA Class: Class 2- mild systemic disease, no acute problems, no functional limitations    NPO Status: appropriately NPO for procedure  UNIVERSAL PROTOCOL   Site Marked: NA  Prior Images Obtained and Reviewed:  Yes  Required items: Required blood products, implants, devices and special equipment available    Patient identity confirmed:  Verbally with patient and arm band  Patient was reevaluated immediately before administering moderate or deep sedation or anesthesia  Confirmation Checklist:  Patient's identity using two indicators, relevant allergies and procedure was appropriate and matched the consent or emergent situation    SEDATION    Patient Sedated: Yes    Sedation:  Etomidate  Vital signs: Vital signs monitored during sedation      PROCEDURE DETAILS       Manipulation performed: yes      Reduction successful: no      Reduction confirmed with imaging: no    PROCEDURE   Time of Sedation in Minutes by Physician:  20        Results for orders placed or performed during the hospital encounter of 08/05/19 (from the past 24 hour(s))   CBC with platelets differential   Result Value Ref Range    WBC 13.2 (H) 4.0 - 11.0 10e9/L    RBC Count 4.23 3.8 - 5.2 10e12/L    Hemoglobin 11.1 (L) 11.7 - 15.7 g/dL    Hematocrit 34.7 (L) 35.0 - 47.0 %    MCV 82 78 - 100 fl    MCH 26.2 (L) 26.5 - 33.0 pg    MCHC 32.0 31.5 - 36.5 g/dL    RDW 14.5 10.0 - 15.0 %    Platelet Count 440 150 - 450 10e9/L    Diff Method Automated Method     % Neutrophils 75.8 %    % Lymphocytes 16.8 %    % Monocytes 5.6 %    % Eosinophils 0.6 %    % Basophils 0.7 %    % Immature Granulocytes 0.5 %    Nucleated RBCs 0 0 /100    Absolute Neutrophil 10.0 (H) 1.6 - 8.3 10e9/L    Absolute Lymphocytes 2.2 0.8 - 5.3 10e9/L    Absolute Monocytes 0.7 0.0 - 1.3 10e9/L    Absolute Eosinophils 0.1 0.0 - 0.7 10e9/L    Absolute Basophils 0.1 0.0 - 0.2 10e9/L    Abs Immature Granulocytes 0.1 0 - 0.4 10e9/L    Absolute Nucleated RBC 0.0    XR Hip Portable Left 2-3 Views    Narrative    XR HIP PORTABLE LEFT 2-3 VIEWS    HISTORY: 54 years Female new hip replacement, probable dislocation    COMPARISON: 6/25/2019    TECHNIQUE: 2 views    FINDINGS: Patient status post left total hip arthroplasty with  superior dislocation.      Impression    IMPRESSION: Left total arthroplasty with superior dislocation.    GITA FORD MD       Medications   diphenhydrAMINE (BENADRYL) injection 25 mg (has no administration in time range)   HYDROmorphone (DILAUDID) injection 1 mg (1 mg Intravenous Given 8/5/19 1185)   etomidate (AMIDATE) injection 20 mg (20 mg Intravenous Given 8/5/19 1602)       Assessments & Plan (with Medical Decision Making)   Tried unsuccessfully to relocate the hip, spoke with Dr. Akbar Miranda following the attempts and he  asked that we admit the patient here, keep her n.p.o. after midnight and he will reduce the hip tomorrow in OR.  Patient informed of the same and is in agreement with this.  Spoke with Dr. Win, he accepted the patient for admission to medical floor.    I have reviewed the nursing notes.    I have reviewed the findings, diagnosis, plan and need for follow up with the patient.     Medication List      There are no discharge medications for this visit.         Final diagnoses:   Posterior dislocation of left hip, initial encounter (H)       8/5/2019   [facility]     Rufina Mckinley MD  08/06/19 0804

## 2019-08-05 NOTE — H&P
[facility]    History and Physical  Hospitalist       Date of Admission:  8/5/2019    Assessment & Plan   Elizabeth Rankin is a 54 year old female who presents with left hip pain. Diagnosed with hip dislocation (superior per radiology report). Attempt to reduce in ED failed. Ortho will try to reduce dislocated hip in OR tomorrow.     Principal Problem:    Closed dislocation of left hip (H)    Assessment: Admit inpatient. Non-traumatic.     Plan: admit.    Pain control   Ortho consulted   NPO after midnight for possible reduction in OR tomorrow.     Active Problems:  DM type 2 (diabetes mellitus, type 2) (H)  Assessment: present on admission.   Plan: continue canagliflozin, hold metformin        Bipolar disorder (H)    Assessment: present on admission    Plan: continue home meds.     HLA B27 +ve, possible ankylosing spondylitis  Assessment: present on admission  Plan: hold adalimumab    Morbid obesity  Assessment: present on admission  Plan: no active intervention. If loose weight, she would benefit in the long run.     DVT Prophylaxis: Enoxaparin (Lovenox) SQ  Code Status: Full Code    Disposition: Expected discharge in 1-2 days once problem resolved.     Mell Felder    Primary Care Physician   Danae Yusuf    Chief Complaint Left hip pain    Reason for Admission: superior dislocation of the Left hip (prosthetic)     Admission status: inpatient. But may need less than 2 midnight stay    History is obtained from the patient, electronic health record and emergency department physician    History of Present Illness   Elizabeth Rankin is a 54 year old women with PMH of DM-2, obesity, bipolar disorder, +ve HLA B27 (on humira), depression, HTN presents to ED with Left hip pain.     6.25.2019: underwent left direct Anterior PATRICK.     2 weeks PTA: started feeling the her L hip prosthesis is not in place. Seen by ortho and assured that prosthesis is OK    7.2.2019 seen in ED for acute post op hip pain. She  "also stated the she ran out of pain killers (was taking 2 tabs instead of 1). Her pain was increasing and was located in the groin. She has had good flexion and extension of the leg on exam in ED. Prescribed hydrocodone and discharged. No imaging studies done.     7.5.2019: day of admission: She admits that she was sitting and only slightly moved her R LE laterally and immediately felt that Left hip \"got out\". EMS was called. Brought to ED. Denied other c/o but L hip rotation, shortening, groin pain. 14 points of ROS obtained. The rest is -ve.     ED events: attempt to reduce by ED physician with procedural sedation by     ED diagnostic workup: XR L hip pre and post reduction. Hemogram: wBC 13.2 Hg 11.1     ED imaging studies and blood test results: failed reduction under conscious sedation. Ortho called. Dr. Miranda will see her in AM and will try to reduce tomorrow in OR.     ED treatment:  Medications   diphenhydrAMINE (BENADRYL) injection 25 mg (has no administration in time range)   HYDROmorphone (DILAUDID) injection 1 mg (1 mg Intravenous Given 8/5/19 1445)   etomidate (AMIDATE) injection 20 mg (20 mg Intravenous Given 8/5/19 1602)         Past Medical History    I have reviewed this patient's medical history and updated it with pertinent information if needed.   Past Medical History:   Diagnosis Date     Bicuspid aortic valve      Bicuspid aortic valve      Bipolar disorder (H) 6/26/2019     Depression      DM type 2 (diabetes mellitus, type 2) (H)      Elevated LDL cholesterol level 6/26/2019     HLA B27 (HLA B27 positive) 6/26/2019     Hyperlipidemia      Hypersomnolence disorder 6/26/2019     Menorrhagia with irregular cycle 6/26/2019     Nondependent alcohol abuse, unspecified drinking b 1/2/2001     PONV (postoperative nausea and vomiting)      Spondylolisthesis of lumbar region 6/26/2019     Status post right hip replacement 6/25/2019       Past Surgical History   I have reviewed this patient's surgical " history and updated it with pertinent information if needed.  Past Surgical History:   Procedure Laterality Date     ARTHROPLASTY HIP ANTERIOR Left 6/25/2019    Procedure: DIRECT ANTERIOR LEFT TOTAL HIP ARTHROPLASTY;  Surgeon: Cirilo Miranda MD;  Location: [facility] OR     BIOPSY Right 10/30/2013    right breast biopsy; fibroadenoma     ORTHOPEDIC SURGERY  07/2014    Right total hip Dr. Meño Sullivan      ORTHOPEDIC SURGERY Right 06/16/2015    Right hip psoas release     ORTHOPEDIC SURGERY Left 09/2015    left hip labrum debridement Dr. Meño Sullivan      TUBAL LIGATION         Prior to Admission Medications   Prior to Admission Medications   Prescriptions Last Dose Informant Patient Reported? Taking?   HYDROcodone-acetaminophen (NORCO) 5-325 MG tablet   No No   Sig: Take 1-2 tablets by mouth every 4 hours as needed for pain   acetaminophen (TYLENOL) 325 MG tablet   No No   Sig: Take 3 tablets (975 mg) by mouth every 6 hours as needed for mild pain   adalimumab (HUMIRA) 40 MG/0.8ML prefilled syringe kit   Yes No   Sig: Inject 40 mg Subcutaneous every 7 days   blood glucose monitoring (ACCU-CHEK SMARTVIEW) test strip   No No   Sig: Use to test blood sugar 3 times daily.   canaliflozin (INVOKANA) tablet   Yes No   Sig: Take 75 mg by mouth every morning (before breakfast)   cholecalciferol (VITAMIN D3) 88523 units (1250 mcg) capsule   Yes No   Sig: Take 50,000 Units by mouth every 7 days   insulin pen needle 32G X 4 MM   No No   Sig: Use 1 pen needle daily.   medroxyPROGESTERone (PROVERA) 10 MG tablet   Yes No   Sig: Take 10 mg by mouth daily   metFORMIN (GLUCOPHAGE) 1000 MG tablet   Yes No   Sig: Take 1 tablet by mouth 2 times daily (with meals).   modafinil (PROVIGIL) 100 MG tablet   Yes No   Sig: Take 100 mg by mouth daily   ondansetron (ZOFRAN ODT) 4 MG ODT tab   No No   Sig: Take 1-2 tablets (4-8 mg) by mouth every 8 hours as needed for nausea   vitamin B-Complex   Yes No   Sig: Take 1 tablet by mouth daily       Facility-Administered Medications: None     Allergies   Allergies   Allergen Reactions     Dulaglutide Other (See Comments)     confusion     Erythromycin Hives     Able to tolerate Zithromax:  Allergy     Januvia [Sitagliptin]      Joint pain, nausea     Meloxicam Swelling     Swelling of lower extremeties; chest pain     Oxycodone      Suppressed respirations     Percocet [Oxycodone-Acetaminophen] Itching     Ritalin [Methylphenidate] Other (See Comments)     Over stimulation     Ritalin [Methylphenidate]      Tramadol      sedation     Tylenol With Codeine [Acetaminophen-Codeine] Nausea     Vistaril [Hydroxyzine]      Dizzy, ankle swelling       Vortioxetine Nausea     Headache, neck pain     Welchol [Colesevelam]      Joint pain, muscle weakness     Strattera [Atomoxetine] Anxiety     Valium [Diazepam] Rash       Social History   I have reviewed this patient's social history and updated it with pertinent information if needed. Elizabeth Rankin  reports that she quit smoking about 17 years ago. Her smoking use included cigarettes. She has a 25.00 pack-year smoking history. She has never used smokeless tobacco. She reports that she drinks alcohol.    Family History   I have reviewed this patient's family history and updated it with pertinent information if needed.   Family History   Problem Relation Age of Onset     Depression Sister      Ankylosing Spondylitis Sister      Diabetes Mother      Hypertension Mother      Ankylosing Spondylitis Mother      Diabetes Father        Review of Systems   All 14 Systems were reviewed and found to be negative except what's mentioned in HPI    Physical Exam   Temp: 97.2  F (36.2  C) Temp src: Tympanic BP: 166/70 Pulse: 85 Heart Rate: 71 Resp: 18 SpO2: 98 % O2 Device: None (Room air)    Vital Signs with Ranges  Temp:  [97.2  F (36.2  C)-98.4  F (36.9  C)] 97.2  F (36.2  C)  Pulse:  [85] 85  Heart Rate:  [71-81] 71  Resp:  [18-24] 18  BP: (126-200)/() 166/70  SpO2:  [97  %-100 %] 98 %  236 lbs 14.4 oz    Physical exam:   GENERAL: Awake, alert, tearful, upset and demanding more pain meds. Constantly rates pain 10/10, but does not look in severe pain. Morbidly obese.   HEENT: NC/AT. MMM. OP clear.   NECK: trachea midline, no JVD.   CARDIAC: regular, normal S1,S2, no S3. No murmurs, gallops or rubs. No bruits in the neck. No peripheral edema. Good distal pulses, but extremities cold.   PULMONARY: normal vesicular breath sounds bilaterally.  GI: abdomen not distended and not tender on deep and superficial palpation, bowel sounds present. No hepatosplenomegaly or pulsatile masses.   : CVA not tender, bladder not palpable.  MUSCULOSKELETAL: R hip post op. Left: shortened, externally rotated. Wound healed.   NEUROLOGICAL: no deficits affected L hip.  Gait not tested.   PSYCHIATRIC: normal affect, normal mood. Awake, alert and oriented x 4.  INTEGUMENT: no rash, no ulcerations, warm, dry.   LYMPHATIC/HEMATOLOGIC: no LAD in the neck, both axillae, and groins. No petechiae, no ecchymoses.       Goals of care were discussed with the patient and family which included but not limited to anticipated treatment course during the current hospitalization, recovery from current event, discharge planning and transitions of care responsibilities and expected outcomes. Code status was addressed on admission as well. End of life care discussion not done.    Data   Data reviewed today:  I personally reviewed hip XR.  Radiologist reviewed: hip XR pre and post attempted reduction.   See below radiology report.   Recent Labs   Lab 08/05/19  1458   WBC 13.2*   HGB 11.1*   MCV 82          Recent Results (from the past 24 hour(s))   XR Hip Portable Left 2-3 Views    Narrative    XR HIP PORTABLE LEFT 2-3 VIEWS    HISTORY: 54 years Female new hip replacement, probable dislocation    COMPARISON: 6/25/2019    TECHNIQUE: 2 views    FINDINGS: Patient status post left total hip arthroplasty with  superior  dislocation.      Impression    IMPRESSION: Left total arthroplasty with superior dislocation.    GITA FORD MD

## 2019-08-05 NOTE — PROGRESS NOTES
Called for standby for conscious sedation.  Jaw thrust/ oral airway x 5mins.  Pt tolerated well.

## 2019-08-06 ENCOUNTER — APPOINTMENT (OUTPATIENT)
Dept: GENERAL RADIOLOGY | Facility: HOSPITAL | Age: 54
DRG: 561 | End: 2019-08-06
Attending: ORTHOPAEDIC SURGERY
Payer: MEDICARE

## 2019-08-06 ENCOUNTER — ANESTHESIA EVENT (OUTPATIENT)
Dept: SURGERY | Facility: HOSPITAL | Age: 54
DRG: 561 | End: 2019-08-06
Payer: MEDICARE

## 2019-08-06 ENCOUNTER — ANESTHESIA (OUTPATIENT)
Dept: SURGERY | Facility: HOSPITAL | Age: 54
DRG: 561 | End: 2019-08-06
Payer: MEDICARE

## 2019-08-06 LAB
ALBUMIN SERPL-MCNC: 2.9 G/DL (ref 3.4–5)
ANION GAP SERPL CALCULATED.3IONS-SCNC: 7 MMOL/L (ref 3–14)
BUN SERPL-MCNC: 9 MG/DL (ref 7–30)
CALCIUM SERPL-MCNC: 8 MG/DL (ref 8.5–10.1)
CHLORIDE SERPL-SCNC: 112 MMOL/L (ref 94–109)
CO2 SERPL-SCNC: 23 MMOL/L (ref 20–32)
CREAT SERPL-MCNC: 0.52 MG/DL (ref 0.52–1.04)
ERYTHROCYTE [DISTWIDTH] IN BLOOD BY AUTOMATED COUNT: 14.5 % (ref 10–15)
GFR SERPL CREATININE-BSD FRML MDRD: >90 ML/MIN/{1.73_M2}
GLUCOSE BLDC GLUCOMTR-MCNC: 109 MG/DL (ref 70–99)
GLUCOSE BLDC GLUCOMTR-MCNC: 145 MG/DL (ref 70–99)
GLUCOSE BLDC GLUCOMTR-MCNC: 156 MG/DL (ref 70–99)
GLUCOSE BLDC GLUCOMTR-MCNC: 92 MG/DL (ref 70–99)
GLUCOSE SERPL-MCNC: 111 MG/DL (ref 70–99)
GLUCOSE SERPL-MCNC: 114 MG/DL (ref 70–99)
HCT VFR BLD AUTO: 31.3 % (ref 35–47)
HGB BLD-MCNC: 9.7 G/DL (ref 11.7–15.7)
MAGNESIUM SERPL-MCNC: 1.9 MG/DL (ref 1.6–2.3)
MCH RBC QN AUTO: 25.8 PG (ref 26.5–33)
MCHC RBC AUTO-ENTMCNC: 31 G/DL (ref 31.5–36.5)
MCV RBC AUTO: 83 FL (ref 78–100)
PHOSPHATE SERPL-MCNC: 3.7 MG/DL (ref 2.5–4.5)
PLATELET # BLD AUTO: 383 10E9/L (ref 150–450)
POTASSIUM SERPL-SCNC: 3.7 MMOL/L (ref 3.4–5.3)
RBC # BLD AUTO: 3.76 10E12/L (ref 3.8–5.2)
SODIUM SERPL-SCNC: 142 MMOL/L (ref 133–144)
WBC # BLD AUTO: 8 10E9/L (ref 4–11)

## 2019-08-06 PROCEDURE — 25000128 H RX IP 250 OP 636: Performed by: NURSE ANESTHETIST, CERTIFIED REGISTERED

## 2019-08-06 PROCEDURE — 36415 COLL VENOUS BLD VENIPUNCTURE: CPT | Performed by: INTERNAL MEDICINE

## 2019-08-06 PROCEDURE — 93005 ELECTROCARDIOGRAM TRACING: CPT

## 2019-08-06 PROCEDURE — 25000125 ZZHC RX 250: Performed by: ANESTHESIOLOGY

## 2019-08-06 PROCEDURE — 25000128 H RX IP 250 OP 636: Performed by: ORTHOPAEDIC SURGERY

## 2019-08-06 PROCEDURE — 27110028 ZZH OR GENERAL SUPPLY NON-STERILE: Performed by: ORTHOPAEDIC SURGERY

## 2019-08-06 PROCEDURE — 25000566 ZZH SEVOFLURANE, EA 15 MIN: Performed by: ANESTHESIOLOGY

## 2019-08-06 PROCEDURE — 85027 COMPLETE CBC AUTOMATED: CPT | Performed by: INTERNAL MEDICINE

## 2019-08-06 PROCEDURE — 40000306 ZZH STATISTIC PRE PROC ASSESS II: Performed by: ORTHOPAEDIC SURGERY

## 2019-08-06 PROCEDURE — 25000132 ZZH RX MED GY IP 250 OP 250 PS 637: Mod: GY | Performed by: ORTHOPAEDIC SURGERY

## 2019-08-06 PROCEDURE — 12000000 ZZH R&B MED SURG/OB

## 2019-08-06 PROCEDURE — 25000125 ZZHC RX 250: Performed by: INTERNAL MEDICINE

## 2019-08-06 PROCEDURE — 37000009 ZZH ANESTHESIA TECHNICAL FEE, EACH ADDTL 15 MIN: Performed by: ORTHOPAEDIC SURGERY

## 2019-08-06 PROCEDURE — 25000128 H RX IP 250 OP 636: Performed by: INTERNAL MEDICINE

## 2019-08-06 PROCEDURE — 99140 ANES COMP EMERGENCY COND: CPT | Performed by: NURSE ANESTHETIST, CERTIFIED REGISTERED

## 2019-08-06 PROCEDURE — 27275 MANIPULATION OF HIP JOINT: CPT | Performed by: ANESTHESIOLOGY

## 2019-08-06 PROCEDURE — 00000146 ZZHCL STATISTIC GLUCOSE BY METER IP

## 2019-08-06 PROCEDURE — 0SWBXJZ REVISION OF SYNTHETIC SUBSTITUTE IN LEFT HIP JOINT, EXTERNAL APPROACH: ICD-10-PCS | Performed by: ORTHOPAEDIC SURGERY

## 2019-08-06 PROCEDURE — 40000275 ZZH STATISTIC RCP TIME EA 10 MIN

## 2019-08-06 PROCEDURE — 99231 SBSQ HOSP IP/OBS SF/LOW 25: CPT | Performed by: INTERNAL MEDICINE

## 2019-08-06 PROCEDURE — 25000125 ZZHC RX 250: Performed by: NURSE ANESTHETIST, CERTIFIED REGISTERED

## 2019-08-06 PROCEDURE — 36000065 ZZH SURGERY LEVEL 4 W FLUORO 1ST 30 MIN: Performed by: ORTHOPAEDIC SURGERY

## 2019-08-06 PROCEDURE — 82947 ASSAY GLUCOSE BLOOD QUANT: CPT | Performed by: INTERNAL MEDICINE

## 2019-08-06 PROCEDURE — 25000125 ZZHC RX 250: Performed by: ORTHOPAEDIC SURGERY

## 2019-08-06 PROCEDURE — 40000985 XR PELVIS PORT 1/2 VW: Mod: TC

## 2019-08-06 PROCEDURE — 36000063 ZZH SURGERY LEVEL 4 EA 15 ADDTL MIN: Performed by: ORTHOPAEDIC SURGERY

## 2019-08-06 PROCEDURE — 80069 RENAL FUNCTION PANEL: CPT | Performed by: INTERNAL MEDICINE

## 2019-08-06 PROCEDURE — 25000131 ZZH RX MED GY IP 250 OP 636 PS 637: Mod: GY | Performed by: ORTHOPAEDIC SURGERY

## 2019-08-06 PROCEDURE — 27275 MANIPULATION OF HIP JOINT: CPT | Performed by: NURSE ANESTHETIST, CERTIFIED REGISTERED

## 2019-08-06 PROCEDURE — 83735 ASSAY OF MAGNESIUM: CPT | Performed by: INTERNAL MEDICINE

## 2019-08-06 PROCEDURE — 71000014 ZZH RECOVERY PHASE 1 LEVEL 2 FIRST HR: Performed by: ORTHOPAEDIC SURGERY

## 2019-08-06 PROCEDURE — 37000008 ZZH ANESTHESIA TECHNICAL FEE, 1ST 30 MIN: Performed by: ORTHOPAEDIC SURGERY

## 2019-08-06 PROCEDURE — 93010 ELECTROCARDIOGRAM REPORT: CPT | Performed by: INTERNAL MEDICINE

## 2019-08-06 RX ORDER — HYDROMORPHONE HYDROCHLORIDE 1 MG/ML
.3-.5 INJECTION, SOLUTION INTRAMUSCULAR; INTRAVENOUS; SUBCUTANEOUS EVERY 10 MIN PRN
Status: DISCONTINUED | OUTPATIENT
Start: 2019-08-06 | End: 2019-08-06 | Stop reason: HOSPADM

## 2019-08-06 RX ORDER — FENTANYL CITRATE 50 UG/ML
25-50 INJECTION, SOLUTION INTRAMUSCULAR; INTRAVENOUS
Status: CANCELLED | OUTPATIENT
Start: 2019-08-06

## 2019-08-06 RX ORDER — HYDRALAZINE HYDROCHLORIDE 20 MG/ML
2.5-5 INJECTION INTRAMUSCULAR; INTRAVENOUS EVERY 10 MIN PRN
Status: DISCONTINUED | OUTPATIENT
Start: 2019-08-06 | End: 2019-08-06 | Stop reason: HOSPADM

## 2019-08-06 RX ORDER — NEOSTIGMINE METHYLSULFATE 1 MG/ML
VIAL (ML) INJECTION PRN
Status: DISCONTINUED | OUTPATIENT
Start: 2019-08-06 | End: 2019-08-07 | Stop reason: HOSPADM

## 2019-08-06 RX ORDER — NALOXONE HYDROCHLORIDE 0.4 MG/ML
.1-.4 INJECTION, SOLUTION INTRAMUSCULAR; INTRAVENOUS; SUBCUTANEOUS
Status: DISCONTINUED | OUTPATIENT
Start: 2019-08-06 | End: 2019-08-06 | Stop reason: HOSPADM

## 2019-08-06 RX ORDER — ONDANSETRON 2 MG/ML
4 INJECTION INTRAMUSCULAR; INTRAVENOUS EVERY 30 MIN PRN
Status: DISCONTINUED | OUTPATIENT
Start: 2019-08-06 | End: 2019-08-06 | Stop reason: HOSPADM

## 2019-08-06 RX ORDER — FENTANYL CITRATE 50 UG/ML
INJECTION, SOLUTION INTRAMUSCULAR; INTRAVENOUS PRN
Status: DISCONTINUED | OUTPATIENT
Start: 2019-08-06 | End: 2019-08-07 | Stop reason: HOSPADM

## 2019-08-06 RX ORDER — MEPERIDINE HYDROCHLORIDE 50 MG/ML
12.5 INJECTION INTRAMUSCULAR; INTRAVENOUS; SUBCUTANEOUS
Status: DISCONTINUED | OUTPATIENT
Start: 2019-08-06 | End: 2019-08-06 | Stop reason: HOSPADM

## 2019-08-06 RX ORDER — ACETAMINOPHEN 325 MG/1
975 TABLET ORAL
Status: DISCONTINUED | OUTPATIENT
Start: 2019-08-06 | End: 2019-08-06 | Stop reason: HOSPADM

## 2019-08-06 RX ORDER — SODIUM CHLORIDE, SODIUM LACTATE, POTASSIUM CHLORIDE, CALCIUM CHLORIDE 600; 310; 30; 20 MG/100ML; MG/100ML; MG/100ML; MG/100ML
INJECTION, SOLUTION INTRAVENOUS CONTINUOUS
Status: DISCONTINUED | OUTPATIENT
Start: 2019-08-06 | End: 2019-08-06 | Stop reason: HOSPADM

## 2019-08-06 RX ORDER — LABETALOL 20 MG/4 ML (5 MG/ML) INTRAVENOUS SYRINGE
10
Status: DISCONTINUED | OUTPATIENT
Start: 2019-08-06 | End: 2019-08-06 | Stop reason: HOSPADM

## 2019-08-06 RX ORDER — DEXAMETHASONE SODIUM PHOSPHATE 4 MG/ML
4 INJECTION, SOLUTION INTRA-ARTICULAR; INTRALESIONAL; INTRAMUSCULAR; INTRAVENOUS; SOFT TISSUE EVERY 10 MIN PRN
Status: DISCONTINUED | OUTPATIENT
Start: 2019-08-06 | End: 2019-08-06 | Stop reason: HOSPADM

## 2019-08-06 RX ORDER — FENTANYL CITRATE 50 UG/ML
25-50 INJECTION, SOLUTION INTRAMUSCULAR; INTRAVENOUS
Status: DISCONTINUED | OUTPATIENT
Start: 2019-08-06 | End: 2019-08-06 | Stop reason: HOSPADM

## 2019-08-06 RX ORDER — PROPOFOL 10 MG/ML
INJECTION, EMULSION INTRAVENOUS PRN
Status: DISCONTINUED | OUTPATIENT
Start: 2019-08-06 | End: 2019-08-07 | Stop reason: HOSPADM

## 2019-08-06 RX ORDER — ONDANSETRON 4 MG/1
4 TABLET, ORALLY DISINTEGRATING ORAL EVERY 30 MIN PRN
Status: DISCONTINUED | OUTPATIENT
Start: 2019-08-06 | End: 2019-08-06 | Stop reason: HOSPADM

## 2019-08-06 RX ORDER — LIDOCAINE HYDROCHLORIDE 20 MG/ML
INJECTION, SOLUTION INFILTRATION; PERINEURAL PRN
Status: DISCONTINUED | OUTPATIENT
Start: 2019-08-06 | End: 2019-08-07 | Stop reason: HOSPADM

## 2019-08-06 RX ORDER — GLYCOPYRROLATE 0.2 MG/ML
INJECTION, SOLUTION INTRAMUSCULAR; INTRAVENOUS PRN
Status: DISCONTINUED | OUTPATIENT
Start: 2019-08-06 | End: 2019-08-07 | Stop reason: HOSPADM

## 2019-08-06 RX ORDER — HYDROCODONE BITARTRATE AND ACETAMINOPHEN 10; 325 MG/1; MG/1
1 TABLET ORAL EVERY 6 HOURS
Qty: 30 TABLET | Refills: 0 | Status: SHIPPED | OUTPATIENT
Start: 2019-08-07

## 2019-08-06 RX ORDER — DEXAMETHASONE SODIUM PHOSPHATE 10 MG/ML
INJECTION, SOLUTION INTRAMUSCULAR; INTRAVENOUS PRN
Status: DISCONTINUED | OUTPATIENT
Start: 2019-08-06 | End: 2019-08-07 | Stop reason: HOSPADM

## 2019-08-06 RX ORDER — ONDANSETRON 2 MG/ML
INJECTION INTRAMUSCULAR; INTRAVENOUS PRN
Status: DISCONTINUED | OUTPATIENT
Start: 2019-08-06 | End: 2019-08-07 | Stop reason: HOSPADM

## 2019-08-06 RX ORDER — SCOLOPAMINE TRANSDERMAL SYSTEM 1 MG/1
1 PATCH, EXTENDED RELEASE TRANSDERMAL ONCE
Status: COMPLETED | OUTPATIENT
Start: 2019-08-06 | End: 2019-08-06

## 2019-08-06 RX ORDER — ALBUTEROL SULFATE 0.83 MG/ML
2.5 SOLUTION RESPIRATORY (INHALATION) EVERY 4 HOURS PRN
Status: DISCONTINUED | OUTPATIENT
Start: 2019-08-06 | End: 2019-08-06 | Stop reason: HOSPADM

## 2019-08-06 RX ADMIN — INSULIN ASPART 1 UNITS: 100 INJECTION, SOLUTION INTRAVENOUS; SUBCUTANEOUS at 18:43

## 2019-08-06 RX ADMIN — HYDROCODONE BITARTRATE AND ACETAMINOPHEN 1 TABLET: 10; 325 TABLET ORAL at 13:47

## 2019-08-06 RX ADMIN — FENTANYL CITRATE 50 MCG: 50 INJECTION, SOLUTION INTRAMUSCULAR; INTRAVENOUS at 11:34

## 2019-08-06 RX ADMIN — CYCLOBENZAPRINE HYDROCHLORIDE 10 MG: 10 TABLET, FILM COATED ORAL at 16:06

## 2019-08-06 RX ADMIN — MIDAZOLAM 2 MG: 1 INJECTION INTRAMUSCULAR; INTRAVENOUS at 11:50

## 2019-08-06 RX ADMIN — DEXAMETHASONE SODIUM PHOSPHATE 10 MG: 10 INJECTION, SOLUTION INTRAMUSCULAR; INTRAVENOUS at 11:30

## 2019-08-06 RX ADMIN — HYDROMORPHONE HYDROCHLORIDE 0.5 MG: 1 INJECTION, SOLUTION INTRAMUSCULAR; INTRAVENOUS; SUBCUTANEOUS at 16:39

## 2019-08-06 RX ADMIN — KETOROLAC TROMETHAMINE 30 MG: 30 INJECTION, SOLUTION INTRAMUSCULAR at 03:39

## 2019-08-06 RX ADMIN — Medication 5 MG: at 11:55

## 2019-08-06 RX ADMIN — HYDROCODONE BITARTRATE AND ACETAMINOPHEN 1 TABLET: 10; 325 TABLET ORAL at 19:41

## 2019-08-06 RX ADMIN — ENOXAPARIN SODIUM 40 MG: 40 INJECTION SUBCUTANEOUS at 21:59

## 2019-08-06 RX ADMIN — HYDROCODONE BITARTRATE AND ACETAMINOPHEN 1 TABLET: 10; 325 TABLET ORAL at 01:46

## 2019-08-06 RX ADMIN — HYDROMORPHONE HYDROCHLORIDE 0.5 MG: 1 INJECTION, SOLUTION INTRAMUSCULAR; INTRAVENOUS; SUBCUTANEOUS at 22:35

## 2019-08-06 RX ADMIN — PROPOFOL 150 MG: 10 INJECTION, EMULSION INTRAVENOUS at 11:28

## 2019-08-06 RX ADMIN — MEDROXYPROGESTERONE ACETATE 10 MG: 5 TABLET ORAL at 13:47

## 2019-08-06 RX ADMIN — Medication 100 MG: at 11:28

## 2019-08-06 RX ADMIN — CYCLOBENZAPRINE HYDROCHLORIDE 10 MG: 10 TABLET, FILM COATED ORAL at 22:00

## 2019-08-06 RX ADMIN — GLYCOPYRROLATE 1 MG: 0.2 INJECTION, SOLUTION INTRAMUSCULAR; INTRAVENOUS at 11:55

## 2019-08-06 RX ADMIN — LIDOCAINE HYDROCHLORIDE 80 MG: 20 INJECTION, SOLUTION INFILTRATION; PERINEURAL at 11:28

## 2019-08-06 RX ADMIN — HYDROCODONE BITARTRATE AND ACETAMINOPHEN 1 TABLET: 10; 325 TABLET ORAL at 08:10

## 2019-08-06 RX ADMIN — MIDAZOLAM 2 MG: 1 INJECTION INTRAMUSCULAR; INTRAVENOUS at 11:54

## 2019-08-06 RX ADMIN — ONDANSETRON 4 MG: 2 INJECTION INTRAMUSCULAR; INTRAVENOUS at 11:30

## 2019-08-06 RX ADMIN — SCOPALAMINE 1 PATCH: 1 PATCH, EXTENDED RELEASE TRANSDERMAL at 10:56

## 2019-08-06 RX ADMIN — FENTANYL CITRATE 50 MCG: 50 INJECTION, SOLUTION INTRAMUSCULAR; INTRAVENOUS at 11:28

## 2019-08-06 RX ADMIN — ONDANSETRON 4 MG: 4 TABLET, ORALLY DISINTEGRATING ORAL at 14:17

## 2019-08-06 ASSESSMENT — ACTIVITIES OF DAILY LIVING (ADL)
ADLS_ACUITY_SCORE: 26

## 2019-08-06 ASSESSMENT — LIFESTYLE VARIABLES: TOBACCO_USE: 1

## 2019-08-06 NOTE — PLAN OF CARE
Pt came back from surgery at 1300 with minimal pain to left hip-gave 10/325 Hydrocodone and 4 mg tab Zofran to prevent nausea per pt request, ice pack to hip.  VSS.  Left leg in knee immobilizer and SCD on rt leg.  Pedal pulses 2+ with use of Doppler, no c/o numbness or tingling. IV occluded, new IV placed.  Tolerating CC diet. Call light in reach.     Face to face report given with opportunity to observe patient.    Report given to Anupama Somers   8/6/2019  3:54 PM

## 2019-08-06 NOTE — ANESTHESIA PREPROCEDURE EVALUATION
Anesthesia Pre-Procedure Evaluation    Patient: Elizabeth Rankin   MRN: [id number] : 1965          Preoperative Diagnosis: DISPLACED LEFT HIP S/P TOTAL HIP IN     Procedure(s):  CLOSED REDUCTION LEFT HIP, POSSIBLE OPEN    Past Medical History:   Diagnosis Date     Bicuspid aortic valve      Bicuspid aortic valve      Bipolar disorder (H) 2019     Depression      DM type 2 (diabetes mellitus, type 2) (H)      Elevated LDL cholesterol level 2019     HLA B27 (HLA B27 positive) 2019     Hyperlipidemia      Hypersomnolence disorder 2019     Menorrhagia with irregular cycle 2019     Nondependent alcohol abuse, unspecified drinking b 2001     PONV (postoperative nausea and vomiting)      Spondylolisthesis of lumbar region 2019     Status post right hip replacement 2019     Past Surgical History:   Procedure Laterality Date     ARTHROPLASTY HIP ANTERIOR Left 2019    Procedure: DIRECT ANTERIOR LEFT TOTAL HIP ARTHROPLASTY;  Surgeon: Cirilo Miranda MD;  Location: [facility]     BIOPSY Right 10/30/2013    right breast biopsy; fibroadenoma     ORTHOPEDIC SURGERY  2014    Right total hip Dr. Meño Sullivan      ORTHOPEDIC SURGERY Right 2015    Right hip psoas release     ORTHOPEDIC SURGERY Left 2015    left hip labrum debridement Dr. Meño Sullivan      TUBAL LIGATION         Anesthesia Evaluation     . Pt has had prior anesthetic.     History of anesthetic complications   - PONV        ROS/MED HX    ENT/Pulmonary:     (+)CACHORRO risk factors (BMI: 39.48) hypertension, obese, tobacco use, Past use , . .    Neurologic:     (+)other neuro Sensorineural hearing loss     Cardiovascular:     (+) Dyslipidemia, hypertension----. : . . . :. valvular problems/murmurs Bicuspid aortic valve:.       METS/Exercise Tolerance:     Hematologic:         Musculoskeletal:   (+) arthritis,  other musculoskeletal- DJD, Chronic LBP, DISPLACED LEFT HIP S/P TOTAL HIP IN       GI/Hepatic:  " - neg GI/hepatic ROS       Renal/Genitourinary:  - ROS Renal section negative       Endo:     (+) type II DM .      Psychiatric:     (+) psychiatric history depression and bipolar      Infectious Disease:  - neg infectious disease ROS       Malignancy:      - no malignancy   Other:    (+) No chance of pregnancy                         Physical Exam  Normal systems: dental    Airway   Mallampati: I  TM distance: >3 FB  Neck ROM: full  Comment: Short neck     Dental     Cardiovascular   Rhythm and rate: regular and normal  (+) murmur     PE comment: 2/6 Murmur     Pulmonary    breath sounds clear to auscultation            Lab Results   Component Value Date    WBC 8.0 08/06/2019    HGB 9.7 (L) 08/06/2019    HCT 31.3 (L) 08/06/2019     08/06/2019     08/06/2019    POTASSIUM 3.7 08/06/2019    CHLORIDE 112 (H) 08/06/2019    CO2 23 08/06/2019    BUN 9 08/06/2019    CR 0.52 08/06/2019     (H) 08/06/2019     (H) 08/06/2019    ALYSSA 8.0 (L) 08/06/2019    PHOS 3.7 08/06/2019    MAG 1.9 08/06/2019    ALBUMIN 2.9 (L) 08/06/2019    ALT 21 06/10/2019    AST 23 06/10/2019    INR 2.0 (H) 07/31/2014    HCG Negative 06/25/2019       Preop Vitals  BP Readings from Last 3 Encounters:   08/06/19 140/72   07/02/19 150/99   06/27/19 135/82    Pulse Readings from Last 3 Encounters:   08/05/19 85   06/13/19 80   12/12/18 74      Resp Readings from Last 3 Encounters:   08/06/19 16   07/02/19 16   06/27/19 18    SpO2 Readings from Last 3 Encounters:   08/06/19 97%   07/02/19 99%   06/27/19 95%      Temp Readings from Last 1 Encounters:   08/06/19 98.5  F (36.9  C) (Tympanic)    Ht Readings from Last 1 Encounters:   06/13/19 1.626 m (5' 4\")      Wt Readings from Last 1 Encounters:   08/05/19 107.5 kg (236 lb 14.4 oz)    Estimated body mass index is 40.66 kg/m  as calculated from the following:    Height as of 6/13/19: 1.626 m (5' 4\").    Weight as of this encounter: 107.5 kg (236 lb 14.4 oz).       Anesthesia " Plan      History & Physical Review  History and physical reviewed and following examination; no interval change.    ASA Status:  3 emergent.    NPO Status:  > 8 hours    Plan for General, RSI and ETT with Intravenous and Propofol induction. Maintenance will be Balanced.    PONV prophylaxis:  Ondansetron (or other 5HT-3), Dexamethasone or Solumedrol and Scopolamine patch       Postoperative Care  Postoperative pain management:  IV analgesics and Oral pain medications.      Consents  Anesthetic plan, risks, benefits and alternatives discussed with:  Patient..                 Giorgio Castellanos MD

## 2019-08-06 NOTE — PROGRESS NOTES
[facility]    Medicine Progress Note - Hospitalist Service       Date of Admission:  8/5/2019  Assessment & Plan      Elizabeth Rankin is a 54 year old female who presents with left hip pain. Diagnosed with hip dislocation (superior per radiology report). Attempt to reduce in ED failed. Ortho will try to reduce dislocated hip in OR today.     Principal Problem:    Closed dislocation of left hip (H)   Pain control   Ortho consulted   To OR today    Active Problems:  DM type 2 (diabetes mellitus, type 2) (H)   continue canagliflozin, hold metformin    Bipolar disorder (H)   continue home meds.     HLA B27 +ve, possible ankylosing spondylitis   hold adalimumab    Morbid obesity   no active intervention. If loose weight, she would benefit in the long run.        Diet: Consistent Carbohydrate Diet 7067-5315 Calories: High Consistent CHO (4-7 CHO units/meal)    DVT Prophylaxis: Enoxaparin (Lovenox) SQ  Alegre Catheter: in place, indication:    Code Status: Full Code      Disposition Plan   Expected discharge: Per orthopedic surgery,   Entered: Chilo Win MD 08/06/2019, 2:46 PM       The patient's care was discussed with the Patient.    Chilo Win MD  Hospitalist Service  [facility]    ______________________________________________________________________    Interval History   Left hip pain controlled.  Denies any chest pain, fever, cough, nausea, vomiting, abdominal pain.  Awaiting surgery.    Data reviewed today: I reviewed all medications, new labs and imaging results over the last 24 hours. I personally reviewed no images or EKG's today.    Physical Exam   Vital Signs: Temp: 98.2  F (36.8  C) Temp src: Oral BP: 133/84   Heart Rate: 90 Resp: 16 SpO2: 94 % O2 Device: None (Room air)    Weight: 236 lbs 14.4 oz  General Appearance: In no acute distress  Respiratory: Clear to auscultation bilaterally  Cardiovascular: S1-S2 regular rhythm and rate  GI: Soft abdomen bowel sounds present nontender  nondistended  Skin: Intact  Other: Left lower extremity shortened and externally rotated.    Data   Recent Labs   Lab 08/06/19  0510 08/05/19  1458   WBC 8.0 13.2*   HGB 9.7* 11.1*   MCV 83 82    440     --    POTASSIUM 3.7  --    CHLORIDE 112*  --    CO2 23  --    BUN 9  --    CR 0.52  --    ANIONGAP 7  --    ALYSSA 8.0*  --    *  114*  --    ALBUMIN 2.9*  --      Recent Results (from the past 24 hour(s))   XR Hip Portable Left 2-3 Views    Narrative    XR HIP PORTABLE LEFT 2-3 VIEWS    HISTORY: 54 years Female new hip replacement, probable dislocation    COMPARISON: 6/25/2019    TECHNIQUE: 2 views    FINDINGS: Patient status post left total hip arthroplasty with  superior dislocation.      Impression    IMPRESSION: Left total arthroplasty with superior dislocation.    GITA FORD MD   XR Pelvis Port 1/2 Views    Narrative    XR PELVIS PORT 1/2 VW    HISTORY: 54 years Female CLOSED REDUCTRION LEFT HIP    COMPARISON: None    TECHNIQUE: Single view pelvis    FINDINGS: There is bilateral hip arthroplasty. There is successful  reduction of left hip dislocation.      Impression    IMPRESSION: Successful reduction of left hip dislocation.    GITA FORD MD     Medications       cyclobenzaprine  10 mg Oral TID     enoxaparin  40 mg Subcutaneous Q24H     HYDROcodone-acetaminophen  1 tablet Oral Q6H     insulin aspart  1-3 Units Subcutaneous TID AC     insulin aspart  1-3 Units Subcutaneous At Bedtime     medroxyPROGESTERone  10 mg Oral Daily     modafinil  100 mg Oral Daily     scopolamine   Transdermal Q8H     [START ON 8/7/2019] scopolamine   Transdermal Once     senna-docusate  1 tablet Oral BID    Or     senna-docusate  2 tablet Oral BID     sodium chloride (PF)  3 mL Intracatheter Q8H

## 2019-08-06 NOTE — INTERVAL H&P NOTE
No Changes.  Dislocated Left PATRICK. Plan to proceed with closed versus open reduction left PATRICK.  Risks/Benefits discussed.  Consent obtained.

## 2019-08-06 NOTE — OR NURSING
To 3C  Report to 3C nurse.  Incisional dressing clean dry and intact.  Ice on CMS warm pink and moves feet.  Pedal pulses present.Pnuemo boots on since admission.

## 2019-08-06 NOTE — PROGRESS NOTES
"Brief conversation with Annalee as visitors for her entered the room shortly after writer did.  Reviewed IM Medicare letter.   Does states that she has a ride home and does not want to be a DNR, \"that's all the last  wanted to talk about last time\"  Declines full assessment today. Writer will attempt again at a later time.   "

## 2019-08-06 NOTE — PLAN OF CARE
Face to face report given with opportunity to observe patient.    Report given to Tavia Lizama   8/6/2019  10:01 AM

## 2019-08-06 NOTE — OP NOTE
Procedure Date: 08/06/2019      PREOPERATIVE DIAGNOSIS:  Dislocated left total hip arthroplasty.      POSTOPERATIVE DIAGNOSIS:  Dislocated left total hip arthroplasty.      PROCEDURE:  Closed reduction, left total hip arthroplasty.      SURGEON:  Cirilo Miranda MD      ASSISTANT:  None.      ANESTHESIA:  General endotracheal anesthesia.      FINDINGS:   1.  Posterior lateral left hip dislocation.   2.  Satisfactory left hip closed reduction.   3.  Stable range of motion to 100 degrees flexion, combined 90 degrees flexion and 35 internal rotation, 90 degrees flexion and 45 external rotation, full extension and external rotation.      BLOOD LOSS:  None.      SPECIMENS:  None.      DRAINS:  None.      COMPLICATIONS:  None.      INDICATIONS:  The patient is a 54-year-old female about 5 weeks out from a left total hip arthroplasty.  She had a dislocation yesterday, unable to be put in in the ER.  Discussed with me over the phone and recommended proceeding with a closed versus open reduction of her left total hip arthroplasty.  Injury occurred while she was sitting and kind of twisted her hip.  She denies any numbness or tingling.  No other injuries were sustained.      DESCRIPTION OF PROCEDURE:  The patient was seen in the preoperative area, surgical site was marked, questions were answered.  She was taken to the operating room, placed under general endotracheal anesthesia.  Paralysis was obtained.  Timeout was called to identify the correct patient and surgical site.  I did a closed reduction maneuver to her left hip, reduced it anatomically.  I then checked her range of motion.  She had stable range of motion to 100 degrees of forward flexion without rotation.  She had combined 90 degrees flexion and 45 external rotation, as well as 90 degrees flexion and 35 internal rotation without impingement or dislocation.  She could not dislocate her hip, even in a flexed position, so I brought her in extension and external  rotation and also could not dislocate her hip.  Postoperative x-ray was then taken while she was asleep and showed concentric reduction with near equal leg lengths and offset and no fractures.      The patient was awakened, extubated, and transferred to PACU in stable condition.      POSTOPERATIVE PLAN:  She will be weightbearing as tolerated.  Posterior hip precautions.  We will place her in a knee immobilizer.  She will work with Therapy.  We will start her on outpatient therapy.  She can discharge home later today or tomorrow if she is comfortable.  I will see her back in 2 weeks in my [facility].  We will take repeat x-rays, AP pelvis, crosstable lateral left hip.         AUSTEN GALINDO MD             D: 2019   T: 2019   MT: CONSTANTINO      Name:     DIANA TAYLOR   MRN:      [id number]        Account:        [id number]   :      1965           Procedure Date: 2019      Document: [id number]

## 2019-08-06 NOTE — BRIEF OP NOTE
[facility]    Brief Operative Note    Pre-operative diagnosis: DISPLACED LEFT HIP S/P TOTAL HIP IN JUNE  Post-operative diagnosis Dislocated L PATRICK  Procedure: Procedure(s):  CLOSED REDUCTION LEFT HIP  Surgeon: Surgeon(s) and Role:     * Cirilo Miranda MD - Primary     * Lucian Bruce PA-C - Assisting  Anesthesia: General   Estimated blood loss: None  Drains: None  Specimens: * No specimens in log *  Findings:   None.  Complications: None.  Implants:  * No implants in log *       [id number]

## 2019-08-06 NOTE — PLAN OF CARE
Reason for hospital stay:  Closed dislocation of left hip     Most recent vitals: /64   Pulse 85   Temp 98.1  F (36.7  C) (Tympanic)   Resp 18   Wt 107.5 kg (236 lb 14.4 oz)   SpO2 95%   BMI 40.66 kg/m    Pain Management:  Pt received 10/325 Norco and toradol with adequate/effective results. Positioned per pt for comfort. Ice given multiple times for comfort.   Orientation:  Alert and oriented. Sonorous while sleeping and appears to have gotten rest this shift.   Cardiac:  Apical pulse regular and pulse has been in the high 50's-80's.   Respiratory:  Lungs are clear and encouraged to take big breaths often.   GI:  Bowel sounds are active and stated her last BM was in the evening on 8/4/19.    :  Alegre catheter in place and draining adequate amounts of clear yellow urine.  Diet: NPO after midnight (took one sip of liquid at 0146)  Skin Issues:  healed scar on left hip and no other skin issues noted. No changes to skin integrity this shift.   IVF:  IV Saline Locked and patent.   Mobility:  Bedrest  Safety:  Call light within reach and encouraged to use with any needs. Frequent checks on patient. Bed alarm on. Free from falls/injury this shift.     Comments:    8/6/2019  5:09 AM  Tarsha Oneal    Face to face report given with opportunity to observe patient.    Report given to Kiya Oneal   8/6/2019  8:01 AM

## 2019-08-06 NOTE — PLAN OF CARE
Reason for hospital stay:  hip displacement     Most recent vitals: /66   Pulse 85   Temp 98.8  F (37.1  C) (Tympanic)   Resp 18   Wt 107.5 kg (236 lb 14.4 oz)   SpO2 96%   BMI 40.66 kg/m      Pain Management:  new orders awaiting placement - IV toradol/flexeril/IV dilauded - trouble with pain level climbing    Orientation:  x4 alert and oriented    Cardiac:  on tele - s1s2    Respiratory:  WDL clear bilat    GI:  BS x4 nontender    :  ramachandran placed    Nutrition:  at 100% dinner - diabetic diet    Skin Issues: intact    IVF:  NS TKO    ABX:  none    Mobility:  very limited - best fast at this time due to hip pain     Safety:  bed lowest position near nurses desk      Face to face report given with opportunity to observe patient.    Report given to Tarsha Mathews   8/5/2019  11:27 PM

## 2019-08-06 NOTE — ANESTHESIA CARE TRANSFER NOTE
Patient: Elizabeth Rankin    Procedure(s):  CLOSED REDUCTION LEFT HIP    Diagnosis: DISPLACED LEFT HIP S/P TOTAL HIP IN JUNE  Diagnosis Additional Information: No value filed.    Anesthesia Type:   General, RSI, ETT     Note:  Airway :Nasal Cannula  Patient transferred to:PACU  Handoff Report: Identifed the Patient, Identified the Reponsible Provider, Reviewed the pertinent medical history, Discussed the surgical course, Reviewed Intra-OP anesthesia mangement and issues during anesthesia, Set expectations for post-procedure period and Allowed opportunity for questions and acknowledgement of understanding      Vitals: (Last set prior to Anesthesia Care Transfer)    CRNA VITALS  8/6/2019 1132 - 8/6/2019 1216      8/6/2019             Pulse:  105    Ht Rate:  107    SpO2:  92 %    Resp Rate (set):  8                Electronically Signed By: LAUREN Avila CRNA  August 6, 2019  12:16 PM

## 2019-08-06 NOTE — PLAN OF CARE
/72   Pulse 85   Temp 98.5  F (36.9  C) (Tympanic)   Resp 16   Wt 107.5 kg (236 lb 14.4 oz)   SpO2 97%   BMI 40.66 kg/m      Pain to left hip, ice applied and medications administered as documented, incision closed without signs of infection and no drainage. Lungs clear and equal bilaterally, VSS, bedrest currently. Will continue to monitor.

## 2019-08-07 ENCOUNTER — APPOINTMENT (OUTPATIENT)
Dept: PHYSICAL THERAPY | Facility: HOSPITAL | Age: 54
DRG: 561 | End: 2019-08-07
Attending: ORTHOPAEDIC SURGERY
Payer: MEDICARE

## 2019-08-07 VITALS
OXYGEN SATURATION: 96 % | HEART RATE: 85 BPM | SYSTOLIC BLOOD PRESSURE: 119 MMHG | DIASTOLIC BLOOD PRESSURE: 56 MMHG | RESPIRATION RATE: 16 BRPM | WEIGHT: 236.9 LBS | BODY MASS INDEX: 40.66 KG/M2 | TEMPERATURE: 97.6 F

## 2019-08-07 LAB
GLUCOSE BLDC GLUCOMTR-MCNC: 135 MG/DL (ref 70–99)
GLUCOSE BLDC GLUCOMTR-MCNC: 142 MG/DL (ref 70–99)
GLUCOSE BLDC GLUCOMTR-MCNC: 153 MG/DL (ref 70–99)

## 2019-08-07 PROCEDURE — 97161 PT EVAL LOW COMPLEX 20 MIN: CPT | Mod: GP | Performed by: PHYSICAL THERAPIST

## 2019-08-07 PROCEDURE — 00000146 ZZHCL STATISTIC GLUCOSE BY METER IP

## 2019-08-07 PROCEDURE — 25000132 ZZH RX MED GY IP 250 OP 250 PS 637: Mod: GY | Performed by: ORTHOPAEDIC SURGERY

## 2019-08-07 PROCEDURE — 25000125 ZZHC RX 250: Performed by: ORTHOPAEDIC SURGERY

## 2019-08-07 PROCEDURE — 97530 THERAPEUTIC ACTIVITIES: CPT | Mod: GP | Performed by: PHYSICAL THERAPIST

## 2019-08-07 PROCEDURE — 25000131 ZZH RX MED GY IP 250 OP 636 PS 637: Mod: GY | Performed by: ORTHOPAEDIC SURGERY

## 2019-08-07 PROCEDURE — 40000275 ZZH STATISTIC RCP TIME EA 10 MIN

## 2019-08-07 RX ORDER — ONDANSETRON 4 MG/1
4 TABLET, FILM COATED ORAL EVERY 6 HOURS PRN
Qty: 30 TABLET | Refills: 0 | Status: SHIPPED | OUTPATIENT
Start: 2019-08-07

## 2019-08-07 RX ADMIN — MODAFINIL 100 MG: 100 TABLET ORAL at 08:25

## 2019-08-07 RX ADMIN — MEDROXYPROGESTERONE ACETATE 10 MG: 5 TABLET ORAL at 08:24

## 2019-08-07 RX ADMIN — INSULIN ASPART 1 UNITS: 100 INJECTION, SOLUTION INTRAVENOUS; SUBCUTANEOUS at 08:24

## 2019-08-07 RX ADMIN — HYDROCODONE BITARTRATE AND ACETAMINOPHEN 1 TABLET: 10; 325 TABLET ORAL at 02:11

## 2019-08-07 RX ADMIN — CYCLOBENZAPRINE HYDROCHLORIDE 10 MG: 10 TABLET, FILM COATED ORAL at 08:24

## 2019-08-07 RX ADMIN — HYDROCODONE BITARTRATE AND ACETAMINOPHEN 1 TABLET: 10; 325 TABLET ORAL at 08:24

## 2019-08-07 RX ADMIN — SENNOSIDES AND DOCUSATE SODIUM 1 TABLET: 8.6; 5 TABLET ORAL at 08:25

## 2019-08-07 RX ADMIN — ONDANSETRON 4 MG: 4 TABLET, ORALLY DISINTEGRATING ORAL at 08:33

## 2019-08-07 ASSESSMENT — ACTIVITIES OF DAILY LIVING (ADL)
ADLS_ACUITY_SCORE: 26
ADLS_ACUITY_SCORE: 26
ADLS_ACUITY_SCORE: 25
ADLS_ACUITY_SCORE: 26

## 2019-08-07 NOTE — PLAN OF CARE
Physical Therapy Discharge Summary    Reason for therapy discharge:    Discharged to home with outpatient therapy.    Progress towards therapy goal(s). See goals on Care Plan in [facility] electronic health record for goal details.  Goals not met.  Barriers to achieving goals:   discharge on same date as initial evaluation.    Therapy recommendation(s):    Continued therapy is recommended.  Rationale/Recommendations:  Continued OP PT recommended for strengthening, gait training.

## 2019-08-07 NOTE — ANESTHESIA POSTPROCEDURE EVALUATION
Patient: Elizabeth Rankin    Procedure(s):  CLOSED REDUCTION LEFT HIP    Diagnosis:DISPLACED LEFT HIP S/P TOTAL HIP IN JUNE Diagnosis Additional Information: No value filed.    Anesthesia Type:  General, RSI, ETT    Note:  Anesthesia Post Evaluation    Patient location during evaluation: PACU, Floor and Bedside  Patient participation: Able to fully participate in evaluation  Level of consciousness: awake and alert  Pain management: adequate  Airway patency: patent  Cardiovascular status: acceptable  Respiratory status: acceptable  Hydration status: stable  PONV: none     Anesthetic complications: None          Last vitals:  Vitals:    08/07/19 0207 08/07/19 0300 08/07/19 0830   BP: 106/53  119/56   Pulse:      Resp: 16 16 16   Temp: 98.4  F (36.9  C)     SpO2: 95%  96%         Electronically Signed By: Giorgio Castellanos MD  August 7, 2019  9:15 AM

## 2019-08-07 NOTE — PLAN OF CARE
Resting in bed with knee immobilizer on.  Pain managed adequately.  Anticipates going home tomorrow.    Face to face report given with opportunity to observe patient.    Report given to Judy RIVERA   8/7/2019  12:33 AM

## 2019-08-07 NOTE — PLAN OF CARE
Reason for hospital stay:  Closed dislocation of L hip  Most recent vitals: /56   Pulse 85   Temp 97.6  F (36.4  C) (Tympanic)   Resp 16   Wt 107.5 kg (236 lb 14.4 oz)   SpO2 96%   BMI 40.66 kg/m    Pain Management:  Scheduled Norco et flexeril with good relief, did receive Dilaudid prior to discharge d/t too early for Norco et flexeril  Orientation:  A/O  Cardiac:  WDL  Respiratory:  Lung sounds clear bilat   GI:  Bowel sounds audible et active  :  Alegre removed et voiding adequately, stated she did have some incontinence but denied dysuria  Nutrition:  WDL  Skin Issues:  WDL  IVF:  Removed for discharge  ABX:  n/a  Mobility:  SBA with walker  Safety:  A/O calls for assist appropriately  Comments:      8/7/2019  2:14 PM  Marisol Da Silva

## 2019-08-07 NOTE — PROGRESS NOTES
Name: Elizabeth Rankin    MRN#: [id number]    Reason for Hospitalization: Posterior dislocation of left hip, initial encounter (H) [S73.015A]    KAREN: average     Discharge Date: 8/7/2019    Patient / Family response to discharge plan: agreeable     Follow-Up Appt:   Future Appointments   Date Time Provider Department Center   8/20/2019  9:15 AM Ursula Mcdowell [facility]       Other Providers (Care Coordinator, [facility] Services, PCA services etc): No    Discharge Disposition: home via daughter, Sonu.  Sonu plans to be here about 11.     Karime Cunha

## 2019-08-07 NOTE — DISCHARGE INSTRUCTIONS
Appointments:     *You have a Follow-up appointment scheduled for Wednesday August 21st at 1:15pm with Dr. Miranda at [facility] if you need to reschedule please call [phone]

## 2019-08-07 NOTE — PROGRESS NOTES
Inpatient Physical Therapy Evaluation    Name: Elizabeth Rankin MRN# [id number]   Age: 54 year old YOB: 1965     Date of Consultation: 2019  Consultation is requested by:  Dr. Miranda  Specific Consultation Request:  Posterior hip precautions, WBAT, knee immobilizer is to help to maintain posterior hip precautions  Primary care provider: Danae Yusuf    General Information:   Onset Date: 19    History of Current Problem/Admission: Posterior dislocation of left hip, initial encounter (H) [S73.015A]    Prior Level of Function: Pt underwent L PATRICK with anterior approach on 19.  Pt states she has had ongoing pain since that time and a general feeling of instability.  Pt states 2 weeks ago she had an episode where she felt a big clunk and sharp pain and felt that it likely dislocated at that time and has had ongoing pain since that time.  Pt states she was sitting in her chair when this dislocation leading to hospitalization occurred.  Pt states she has been independent with ADLs except getting socks/shoes on because it was too painful to bend forward.  Pt had progressed from FWW to SEC to nothing but was having increased pain so was advised by PA to go back to SEC if needed.  Pt had been driving.  Pt had not started outpatient PT but has appointment to start this Friday.  Ambulation: 0 - Independent   Transferrin - Independent   Toiletin - Independent    Bathin - Independent   Dressin - Assistive Person   Eatin - Independent   Communication: 0 - Understands/communicates without difficulty  Swallowin - swallows foods/liquids without difficulty  Cognition: 0 - no cognitive issues reported    Fall history within the last 6 months: No    Current Living Situation: Patient has threshold to step over to get into home, no stairs within.  Pt has a daughter at home living with her    Current Equipment Used at Home: FWW, SEC     Patient & Family Goals: pain control, go  home with outpatient PT     Past Medical History:   Past Medical History:   Diagnosis Date     Bicuspid aortic valve      Bicuspid aortic valve      Bipolar disorder (H) 6/26/2019     Depression      DM type 2 (diabetes mellitus, type 2) (H)      Elevated LDL cholesterol level 6/26/2019     HLA B27 (HLA B27 positive) 6/26/2019     Hyperlipidemia      Hypersomnolence disorder 6/26/2019     Menorrhagia with irregular cycle 6/26/2019     Nondependent alcohol abuse, unspecified drinking b 1/2/2001     PONV (postoperative nausea and vomiting)      Spondylolisthesis of lumbar region 6/26/2019     Status post right hip replacement 6/25/2019       Past Surgical History:  Past Surgical History:   Procedure Laterality Date     ARTHROPLASTY HIP ANTERIOR Left 6/25/2019    Procedure: DIRECT ANTERIOR LEFT TOTAL HIP ARTHROPLASTY;  Surgeon: Cirilo Miranda MD;  Location: [facility]     BIOPSY Right 10/30/2013    right breast biopsy; fibroadenoma     ORTHOPEDIC SURGERY  07/2014    Right total hip Dr. Meño Sullivan      ORTHOPEDIC SURGERY Right 06/16/2015    Right hip psoas release     ORTHOPEDIC SURGERY Left 09/2015    left hip labrum debridement Dr. Meño Sullivan      TUBAL LIGATION         Medications:   Current Facility-Administered Medications   Medication     cyclobenzaprine (FLEXERIL) tablet 10 mg     glucose gel 15-30 g    Or     dextrose 50 % injection 25-50 mL    Or     glucagon injection 1 mg     enoxaparin (LOVENOX) injection 40 mg     HYDROcodone-acetaminophen (NORCO)  MG per tablet 1 tablet     HYDROmorphone (PF) (DILAUDID) injection 0.5 mg     insulin aspart (NovoLOG) inj (RAPID ACTING)     insulin aspart (NovoLOG) inj (RAPID ACTING)     ketorolac (TORADOL) injection 30 mg     lidocaine (LMX4) kit     lidocaine 1 % 0.1-1 mL     medroxyPROGESTERone (PROVERA) tablet 10 mg     melatonin tablet 1 mg     modafinil (PROVIGIL) tablet 100 mg     naloxone (NARCAN) injection 0.1-0.4 mg     ondansetron (ZOFRAN-ODT) ODT  tab 4 mg    Or     ondansetron (ZOFRAN) injection 4 mg     scopolamine (TRANSDERM-SCOP) patch REMOVAL     senna-docusate (SENOKOT-S/PERICOLACE) 8.6-50 MG per tablet 1 tablet    Or     senna-docusate (SENOKOT-S/PERICOLACE) 8.6-50 MG per tablet 2 tablet     sodium chloride (PF) 0.9% PF flush 3 mL     sodium chloride (PF) 0.9% PF flush 3 mL     Facility-Administered Medications Ordered in Other Encounters   Medication     dexamethasone PF (DECADRON) injection     fentaNYL (PF) (SUBLIMAZE) injection     glycopyrrolate (ROBINUL) injection     lidocaine 2% injection (MDV)     midazolam (VERSED) injection     neostigmine (PROSTIGMINE) injection     ondansetron (ZOFRAN) injection     propofol (DIPRIVAN) injection 10 mg/mL vial     succinylcholine (ANECTINE) injection       Weight Bearing Status: WBAT     Cognitive Status Examination:  Orientation: oriented to time, place and person  Level of Consciousness: alert  Follows Commands and Answers Questions: 75% of the time  Personal Safety and Judgement: Intact  Memory: Short-term memory intact and Long-term memory intact  Comments: appears somewhat anxious during PT evaluation    Pain:   Currently in pain? Yes  Pain Location? left hip  Pain Ratin    Physical Therapy Evaluation:   Integumentary/Edema: healed incision left anterior hip  ROM: ROM generally WFL for mobility but maintained within precautions  Strength: MMT deferred but appears WFL for mobility  Bed Mobility: sup>sit SBA  Transfers: sit<>stand SBA with immobilizer in place  Gait: ambulated 200' with FWW SBA with immobilizer in place so lacked toe off and knee flexion on swing through.   Stairs: NT  Balance: good with support from walker  Sensory: intact to light touch bilateral LEs  Coordination: NT    Physical Therapy Interventions: Gait Training , Transfer Training, Risk Factor Education, Home Programming Guidelines and Progressive Activity/Exercise     Clinical Impressions:  Criteria for Skilled Therapeutic  Intervention Met: Yes, treatment indicated  PT Diagnosis: impaired gait and mobility s/p hip dislocation  Influenced by the following impairments: pain, decreased strength, decreased activity tolerance  Functional limitations due to impairment: decreased tolerance for transfers, decreased tolerance for ambulation  Clinical presentation: Evolving/Changing  Clinical presentation rationale: clinical judgement  Clinical Decision making (complexity): Low Complexity  Frequency: Daily  Predicted Duration of Therapy Intervention (days/wks): 5 days  Anticipated Discharge Disposition: Home with Outpatient Therapy   Anticipated Equipment Needs at Discharge:   Risks and Benefits of therapy have been explained: Yes  Patient, Family & other staff in agreement with plan of care: Yes  Clinical Impression Comments: Pt s/p anterior approach PATRICK June 25th that dislocated now s/p relocation.  Pt now with posterior hip precautions to protect joint.  Pt still has pain but tolerated ambulation fairly well.  Will benefit from ongoing skilled PT as an outpatient to progress strength and mobility.    Total Eval Time: 25    G-Codes (Eval Only Medicare/ARE/[employer])

## 2019-08-07 NOTE — PLAN OF CARE
Patient discharged at 2:20 PM via wheel chair accompanied by daughter and staff. Prescriptions sent with patient to fill . All belongings sent with patient.     Discharge instructions reviewed with pt et dtr. Listed belongings gathered and returned to patient. yes    Patient discharged to home.   Report called to n/a     Core Measures and Vaccines  Core Measures applicable during stay: No. If yes, state diagnosis: n/a   Pneumonia and Influenza given prior to discharge, if indicated: N/A    Surgical Patient   Surgical Procedures during stay: n/a  Did patient receive discharge instruction on wound care and recognition of infection symptoms? N/A    MISC  Follow up appointment made:  Yes  Home and hospital aquired medications returned to patient: N/A  Patient reports pain was well managed at discharge: Yes

## 2019-08-07 NOTE — PROGRESS NOTES
Assessment completed see flowsheet.    LOC: alert, oriented   Others present: Patient    Dx: closed left hip dislocation   Chronic Disease Management: diabetes     Lives with: Sonu hickey  Living at:  Home   Transportation: YES Reliable; Sonu will drive for appointments     Primary PCP: Danae Yusuf  Insurance:  Medicare and Medicaid   Medicare IM letter reviewed with Annalee; reviewed yesterday.    Support System:  Daughter, friends, Judaism   Homecare/PCA: not contected   /[facility] Services:   [facility]   [place]: NO     VA Referral line called: n/a    Health Care Directive: NO declines information  Guardian: no   POA: no     Pharmacy: [facility]  Meds management: YES independently manages     Adequate Resources for needs (housing, utilities, food/med): YES  Household chores: shared now after hip replacement   Work/community/social activity: YES as desired     ADLs: independent   Ambulation:utilzies a walker at this time  Falls: denies   Nutrition: no concerns   Sleep: no concerns     Equipment used: walker       Oxygen supplier: no       Does the supplier have valid oxygen orders: n/a    Mental health: dx of bipolar; works with Enedelia Waldrop, identifies well managed   Substance abuse: denies   Exposure to violence/abuse: denies   Stressors: denies     Able to Return to Prior Living Arrangements: YES    Barriers: no barriers identified     KAREN: average     Plan: return home via Sonu hickey and resume outpatient PT at [facility].  Has an appointment on Friday.

## 2019-08-07 NOTE — PLAN OF CARE
Discharge Planner PT   Patient plan for discharge: home with outpatient PT  Current status: sup>sit SBA, sit<>stand SBA with immobilizer in place to maintain hip precautions, ambulated 200' with FWW SBA with immobilizer in place  Barriers to return to prior living situation: compliance with hip precautions  Recommendations for discharge: home with outpatient PT  Rationale for recommendations: Pt s/p anterior approach PATRICK June 25th that dislocated now s/p relocation.  Pt now with posterior hip precautions to protect joint.  Pt still has pain but tolerated ambulation fairly well.  Will benefit from ongoing skilled PT as an outpatient to progress strength and mobility.       Entered by: Berta Mathur, PT 08/07/2019 9:14 AM

## 2019-08-07 NOTE — PROGRESS NOTES
Subjective: The patient is POD #1 s/p closed reduction of dislocated left Total Hip Arthroplasty.  The patients pain is controlled fairly well.  They deny shortness of breath, chest pain, nausea or vomiting.  There have been no acute perioperative complications    Objective:   B/P: 106/53, Temp: 98.4, Pulse: 85, Resp: 16    Alert and Orientated x3; No Acute Distress; Appears comfortable     Hip Exam: equal leg length and no pain with ROM    No evidence of DVT    Distal motor/sensory exam is intact in the superficial peroneal, deep peroneal and tibial nerve distributions.      Normal capillary refill with a palpable dorsalis pedis pulse.    Hemoglobin   Date Value Ref Range Status   08/06/2019 9.7 (L) 11.7 - 15.7 g/dL Final       Assessment/Plan:     1) POD # 1 s/p closed reduction of dislocated left Total Hip Arthroplasty.  -Pain controlled  -PT/OT--- Strengthening and Gait training  -WBAT; Hip Precautions, cont knee immobilizer  -DVT prophylaxis  -Dispo--To home when cleared Medically and by PT.   -Follow-Up in [facility] in 2 weeks  -Hospitalist co-management

## 2019-08-07 NOTE — PLAN OF CARE
Pt A&Ox3. She reports pain in left hip, received scheduled norco with some relief. Ice to left hip. Immobilizer on throughout night. Slept majority of shift. IV saline lock. VSS. Accu check 135. Alegre patent with adequate output. Scope patch remains in place. Lung sounds are clear, bowel sounds are active. Alarms on, call light within reach and pt calls appropriately.     Face to face report given with opportunity to observe patient.    Report given to TAVON Damon   8/7/2019  7:51 AM

## 2019-08-08 NOTE — ADDENDUM NOTE
Addendum  created 08/08/19 1012 by Belkis Quiles APRN CRNA    Intraprocedure Event edited, Intraprocedure Staff edited, Sign clinical note

## 2019-08-08 NOTE — ANESTHESIA CARE TRANSFER NOTE
Patient: Elizabeth Rankin    Procedure(s):  CLOSED REDUCTION LEFT HIP    Diagnosis: DISPLACED LEFT HIP S/P TOTAL HIP IN JUNE  Diagnosis Additional Information: No value filed.    Anesthesia Type:   General, RSI, ETT     Note:  Airway :Nasal Cannula  Patient transferred to:PACU  Handoff Report: Identifed the Patient, Identified the Reponsible Provider, Reviewed the pertinent medical history, Discussed the surgical course, Reviewed Intra-OP anesthesia mangement and issues during anesthesia, Set expectations for post-procedure period and Allowed opportunity for questions and acknowledgement of understanding      Vitals: (Last set prior to Anesthesia Care Transfer)    CRNA VITALS  8/6/2019 1132 - 8/6/2019 1232      8/6/2019             Pulse:  105    Ht Rate:  107    SpO2:  92 %    Resp Rate (set):  8                Electronically Signed By: LAUREN Avila CRNA  August 8, 2019  10:12 AM

## 2019-08-09 ENCOUNTER — APPOINTMENT (OUTPATIENT)
Dept: GENERAL RADIOLOGY | Facility: HOSPITAL | Age: 54
End: 2019-08-09
Attending: PHYSICIAN ASSISTANT
Payer: MEDICARE

## 2019-08-09 ENCOUNTER — HOSPITAL ENCOUNTER (EMERGENCY)
Facility: HOSPITAL | Age: 54
Discharge: ANOTHER HOSPITAL | End: 2019-08-09
Attending: PHYSICIAN ASSISTANT | Admitting: PHYSICIAN ASSISTANT
Payer: MEDICARE

## 2019-08-09 VITALS
RESPIRATION RATE: 18 BRPM | SYSTOLIC BLOOD PRESSURE: 153 MMHG | OXYGEN SATURATION: 99 % | TEMPERATURE: 98 F | HEART RATE: 84 BPM | DIASTOLIC BLOOD PRESSURE: 89 MMHG

## 2019-08-09 DIAGNOSIS — S73.005A CLOSED DISLOCATION OF LEFT HIP, INITIAL ENCOUNTER (H): Primary | ICD-10-CM

## 2019-08-09 PROCEDURE — 72170 X-RAY EXAM OF PELVIS: CPT | Mod: TC

## 2019-08-09 PROCEDURE — 99285 EMERGENCY DEPT VISIT HI MDM: CPT | Mod: 25

## 2019-08-09 PROCEDURE — 25000125 ZZHC RX 250: Performed by: PHYSICIAN ASSISTANT

## 2019-08-09 PROCEDURE — 25000128 H RX IP 250 OP 636: Performed by: PHYSICIAN ASSISTANT

## 2019-08-09 PROCEDURE — 27250 TREAT HIP DISLOCATION: CPT | Mod: LT

## 2019-08-09 PROCEDURE — 25000132 ZZH RX MED GY IP 250 OP 250 PS 637: Mod: GY | Performed by: PHYSICIAN ASSISTANT

## 2019-08-09 PROCEDURE — 27250 TREAT HIP DISLOCATION: CPT | Mod: 54 | Performed by: PHYSICIAN ASSISTANT

## 2019-08-09 RX ORDER — HYDROCODONE BITARTRATE AND ACETAMINOPHEN 5; 325 MG/1; MG/1
2 TABLET ORAL ONCE
Status: COMPLETED | OUTPATIENT
Start: 2019-08-09 | End: 2019-08-09

## 2019-08-09 RX ORDER — ETOMIDATE 2 MG/ML
20 INJECTION INTRAVENOUS ONCE
Status: COMPLETED | OUTPATIENT
Start: 2019-08-09 | End: 2019-08-09

## 2019-08-09 RX ORDER — KETOROLAC TROMETHAMINE 30 MG/ML
30 INJECTION, SOLUTION INTRAMUSCULAR; INTRAVENOUS ONCE
Status: COMPLETED | OUTPATIENT
Start: 2019-08-09 | End: 2019-08-09

## 2019-08-09 RX ADMIN — KETOROLAC TROMETHAMINE 30 MG: 30 INJECTION, SOLUTION INTRAMUSCULAR at 19:24

## 2019-08-09 RX ADMIN — HYDROCODONE BITARTRATE AND ACETAMINOPHEN 2 TABLET: 5; 325 TABLET ORAL at 20:48

## 2019-08-09 RX ADMIN — HYDROMORPHONE HYDROCHLORIDE 1 MG: 1 INJECTION, SOLUTION INTRAMUSCULAR; INTRAVENOUS; SUBCUTANEOUS at 19:12

## 2019-08-09 RX ADMIN — ETOMIDATE 20 MG: 2 INJECTION, SOLUTION INTRAVENOUS at 18:53

## 2019-08-09 RX ADMIN — HYDROMORPHONE HYDROCHLORIDE 0.5 MG: 1 INJECTION, SOLUTION INTRAMUSCULAR; INTRAVENOUS; SUBCUTANEOUS at 18:52

## 2019-08-09 NOTE — ED PROVIDER NOTES
History     Chief Complaint   Patient presents with     Hip Pain     pt feels like she has a dislocated left hip     The history is provided by the patient.     Elizabeth Rankin is a 54 year old female who presented to the emergency department via EMS for evaluation of left hip pain.  Patient is status post left total hip arthroplasty on June 25.  She was seen in this emergency department for left hip dislocation on August 5 that could not be reduced to the emergency department and had to be reduced in surgery under general anesthesia.  Patient reports that she was wearing her knee brace but she was attempting to get out of the vehicle when she took the knee brace off and felt the hip dislocate.  Denies any falls.    Allergies:  Allergies   Allergen Reactions     Dulaglutide Other (See Comments)     confusion     Erythromycin Hives     Able to tolerate Zithromax:  Allergy     Januvia [Sitagliptin]      Joint pain, nausea     Meloxicam Swelling     Swelling of lower extremeties; chest pain     Oxycodone      Suppressed respirations     Percocet [Oxycodone-Acetaminophen] Itching     Ritalin [Methylphenidate] Other (See Comments)     Over stimulation     Ritalin [Methylphenidate]      Tramadol      sedation     Tylenol With Codeine [Acetaminophen-Codeine] Nausea     Vistaril [Hydroxyzine]      Dizzy, ankle swelling       Vortioxetine Nausea     Headache, neck pain     Welchol [Colesevelam]      Joint pain, muscle weakness     Strattera [Atomoxetine] Anxiety     Valium [Diazepam] Rash       Problem List:    Patient Active Problem List    Diagnosis Date Noted     Closed dislocation of left hip (H) 08/05/2019     Priority: Medium     Hip dislocation, left (H) 08/05/2019     Priority: Medium     Hypersomnolence disorder 06/26/2019     Priority: Medium     Menorrhagia with irregular cycle 06/26/2019     Priority: Medium     Bipolar disorder (H) 06/26/2019     Priority: Medium     Spondylolisthesis of lumbar region  06/26/2019     Priority: Medium     Elevated LDL cholesterol level 06/26/2019     Priority: Medium     HLA B27 (HLA B27 positive) 06/26/2019     Priority: Medium     Status post right hip replacement 06/25/2019     Priority: Medium     Lumbar spondylosis 04/21/2017     Priority: Medium     ACP (advance care planning) 07/27/2016     Priority: Medium     Advance Care Planning 7/27/2016: ACP Review of Chart / Resources Provided:  Reviewed chart for advance care plan.  Elizabeth Rankin has no plan or code status on file. Discussed available resources and provided with information. Confirmed code status reflects current choices pending further ACP discussions.  Confirmed/documented legally designated decision makers.  Added by PRABHA LOYA             DM type 2 (diabetes mellitus, type 2) (H) 09/03/2013     Priority: Medium     Depression 09/03/2013     Priority: Medium        Past Medical History:    Past Medical History:   Diagnosis Date     Bicuspid aortic valve      Bicuspid aortic valve      Bipolar disorder (H) 6/26/2019     Depression      DM type 2 (diabetes mellitus, type 2) (H)      Elevated LDL cholesterol level 6/26/2019     HLA B27 (HLA B27 positive) 6/26/2019     Hyperlipidemia      Hypersomnolence disorder 6/26/2019     Menorrhagia with irregular cycle 6/26/2019     Nondependent alcohol abuse, unspecified drinking b 1/2/2001     PONV (postoperative nausea and vomiting)      Spondylolisthesis of lumbar region 6/26/2019     Status post right hip replacement 6/25/2019       Past Surgical History:    Past Surgical History:   Procedure Laterality Date     ARTHROPLASTY HIP ANTERIOR Left 6/25/2019    Procedure: DIRECT ANTERIOR LEFT TOTAL HIP ARTHROPLASTY;  Surgeon: Cirilo Miranda MD;  Location: [facility] OR     BIOPSY Right 10/30/2013    right breast biopsy; fibroadenoma     CLOSED REDUCTION HIP Left 8/6/2019    Procedure: CLOSED REDUCTION LEFT HIP;  Surgeon: Cirilo Miranda MD;  Location: [facility] OR     ORTHOPEDIC SURGERY   2014    Right total hip Dr. Meño Sullivan      ORTHOPEDIC SURGERY Right 2015    Right hip psoas release     ORTHOPEDIC SURGERY Left 2015    left hip labrum debridement Dr. Meño Sullivan      TUBAL LIGATION         Family History:    Family History   Problem Relation Age of Onset     Depression Sister      Ankylosing Spondylitis Sister      Diabetes Mother      Hypertension Mother      Ankylosing Spondylitis Mother      Diabetes Father        Social History:  Marital Status:   [5]  Social History     Tobacco Use     Smoking status: Former Smoker     Packs/day: 1.00     Years: 25.00     Pack years: 25.00     Types: Cigarettes     Last attempt to quit:      Years since quittin.6     Smokeless tobacco: Never Used   Substance Use Topics     Alcohol use: Yes     Comment: formerly: 2 beers weekly     Drug use: Not Currently        Medications:      adalimumab (HUMIRA) 40 MG/0.8ML prefilled syringe kit   canaliflozin (INVOKANA) tablet   celecoxib (CELEBREX) 200 MG capsule   HYDROcodone-acetaminophen (NORCO)  MG per tablet   medroxyPROGESTERone (PROVERA) 10 MG tablet   metFORMIN (GLUCOPHAGE) 1000 MG tablet   modafinil (PROVIGIL) 100 MG tablet   naproxen sodium 220 MG capsule   ondansetron (ZOFRAN) 4 MG tablet   vitamin B-Complex         Review of Systems   Musculoskeletal:        Left hip pain       Physical Exam   BP: 167/91  Pulse: 85  Temp: 98.1  F (36.7  C)  Resp: 18  SpO2: 94 %      Physical Exam   Constitutional: She is oriented to person, place, and time. She appears well-developed and well-nourished.   Cardiovascular: Regular rhythm.   Musculoskeletal:   Mild shortening and external rotation of left hip.  Foot is pink with normal pulses.   Neurological: She is alert and oriented to person, place, and time.   Skin: Skin is warm and dry. Capillary refill takes less than 2 seconds.   Psychiatric: She has a normal mood and affect.   Nursing note and vitals reviewed.      ED  Course        [facility]    Orthopedic injury treatment  Date/Time: 8/9/2019 7:33 PM  Performed by: Autumn Rain PA-C  Authorized by: Autumn Rain PA-C     UNIVERSAL PROTOCOL   Site Marked: Yes  Prior Images Obtained and Reviewed:  Yes  Required items: Required blood products, implants, devices and special equipment available    Patient identity confirmed:  Verbally with patient  Patient was reevaluated immediately before administering moderate or deep sedation or anesthesia  Confirmation Checklist:  Patient's identity using two indicators  Time out: Immediately prior to the procedure a time out was called    Preparation: none required       PRE-PROCEDURE DETAILS  Injury location: hip  Location details: left hip  Injury type: dislocation  Dislocation type: posterior  Spontaneous dislocation: yes  Prosthesis: yes  Pre-procedure neurovascular assessment: neurovascularly intact  Pre-procedure distal perfusion: normal  Pre-procedure neurological function: normal  Pre-procedure range of motion: normal      SEDATION    Patient Sedated: Yes    Sedation Type:  Deep  Sedation:  Etomidate  Vital signs: Vital signs monitored during sedation      POST PROCEDURE DETAILS  Manipulation performed: yes  Reduction method: Allis maneuver and traction and counter traction  Reduction successful: no  Post-procedure neurovascular assessment: post-procedure neurovascularly intact  Post-procedure distal perfusion: normal  Post-procedure neurological function: normal  Post-procedure range of motion: normal      PROCEDURE   Patient Tolerance:  Patient tolerated the procedure well with no immediate complications    Time of Sedation in Minutes by Physician:  [address] [place] [address] Procedure Note        Sedation:      Performed by: Autumn Rain  Authorized by: Autumn Rain    Pre-Procedure Assessment done at 1800.    Expected Level:  Deep Sedation    Indication:  Sedation is required to allow for joint  reduction    Consent obtained from patient after discussing the risks, benefits and alternatives.    PO Intake:  Appropriately NPO for procedure    ASA Class:  Class 2 - MILD SYSTEMIC DISEASE, NO ACUTE PROBLEMS, NO FUNCTIONAL LIMITATIONS.    Mallampati:  Grade 2:  Soft palate, base of uvula, tonsillar pillars, and portion of posterior pharyngeal wall visible    Lungs: Lungs Clear with good breath sounds bilaterally.     Heart: Normal heart sounds and rate    History and physical reviewed and no updates needed. I have reviewed the lab findings, diagnostic data, medications, and the plan for sedation. I have determined this patient to be an appropriate candidate for the planned sedation and procedure and have reassessed the patient IMMEDIATELY PRIOR to sedation and procedure.      Sedation Post Procedure Summary:    Prior to the start of the procedure and with procedural staff participation, I verbally confirmed the patient s identity using two indicators, relevant allergies, that the procedure was appropriate and matched the consent or emergent situation, and that the correct equipment/implants were available. Immediately prior to starting the procedure I conducted the Time Out with the procedural staff and re-confirmed the patient s name, procedure, and site/side. (The Joint Commission universal protocol was followed.)  Yes      Sedatives: Etomidate    Vital signs, airway, End Tidal CO2 and pulse oximetry were monitored and remained stable throughout the procedure and sedation was maintained until the procedure was complete.  The patient was monitored by staff until sedation discharge criteria were met.    Patient tolerance: Patient tolerated the procedure well with no immediate complications.    Time of sedation in minutes:  15 minutes from beginning to end of physician one to one monitoring.               Critical Care time:  none               Results for orders placed or performed during the hospital encounter  of 08/09/19 (from the past 24 hour(s))   XR Pelvis Port 1/2 Views    Narrative    XR PELVIS PORT 1/2 VW, 8/9/2019 5:43 PM    History: Female, age 54 years; left hip pain    Comparison: 8/6/2019    Technique: Single view the pelvis was obtained.    FINDINGS: The bones are normally mineralized. The bony pelvis and  visualized portions of the hips demonstrates bilateral hip  arthroplasties with dislocation of the left femoral component. No  evidence of acute pelvic fracture.      Impression    IMPRESSION:   1.  Bilateral hip arthroplasty devices with left hip dislocation.    AILYN MORRIS MD       Medications   ketorolac (TORADOL) injection 30 mg (has no administration in time range)   HYDROmorphone (DILAUDID) injection 1 mg (0.5 mg Intravenous Given 8/9/19 1852)   etomidate (AMIDATE) injection 20 mg (20 mg Intravenous Given 8/9/19 1853)   HYDROmorphone (DILAUDID) injection 1 mg (1 mg Intravenous Given 8/9/19 1912)       Assessments & Plan (with Medical Decision Making)   Most consistent with a posterior hip dislocation identical to August 5.  Attempt was made at that time with myself and Dr. Federico Ellsworth without reduction.  She required general anesthesia with endotracheal intubation the next morning to get her hip reduced.  Today she was pivoting out of her vehicle and had an identical dislocation.  Difficult to see if it is posterior on the AP view.  Verbal and written consent was obtained for deep sedation with closed reduction of the left hip.  Satisfactory sedation was obtained using 20 mg of etomidate and 0.5 mg of Dilaudid.  Multiple attempts at reduction including Orion and Captain Giovanny utilizing countertraction.  Reduction was unsuccessful.  Discussed case with Dr. Oro and Dr. Talbot. Accepted by Dr. Mesa.       I have reviewed the nursing notes.    I have reviewed the findings, diagnosis, plan and need for follow up with the patient.       New Prescriptions    No medications on file        Final diagnoses:   Closed dislocation of left hip, initial encounter (H)       8/9/2019   [facility]     Autumn Rain PA-C  08/09/19 1934

## 2019-08-10 ENCOUNTER — TRANSFERRED RECORDS (OUTPATIENT)
Dept: HEALTH INFORMATION MANAGEMENT | Facility: CLINIC | Age: 54
End: 2019-08-10

## 2019-08-10 NOTE — ED NOTES
Pt given 2 lortab pain pills. Pt was on bedpan and upon removal of bedpan pt tearful and having cramping pain in left hip. Attempted reposition. Awaiting ambulance.

## 2019-08-10 NOTE — ED NOTES
Pt yelling and screaming upon transfer to ambulance stretcher. Medic will give pain medications on ambulance. Pt calm when pt being transferred to ambulance garage.

## 2019-08-10 NOTE — ED NOTES
Pt to room 5 by EMS. Pt was sitting in her car after PT when she felt a pop in her left hip. Pt had recent hip dislocation and feels like its dislocated again. pts left leg shorter than right and increased pain with movement. Pedal Pulses felt by doppler. Call light in reach. Family at bedside.

## 2019-08-13 NOTE — DISCHARGE SUMMARY
Date of Admission: 8/5/19    Date of Discharge: 8/7/19    Admitting Physician: Cirilo Miranda MD    Consultations: Hospitalist Service    Admitting Diagnosis: Left Hip Dislocation    Discharge Diagnosis: Left Hip Dislocation    Surgical Procedures Performed: Left PATRICK Closed Reduction    Hospital Complications: None    Discharge Medications: No Changes to home meds; Norco; Zofran    Discharge Disposition: Home    Discharge Diet: As at home    Discharge Activity: WBAT Left Lower Extremity; Posterior Hip ROM precautions    Hospital Course:   The patient underwent a Left Direct Anterior PATRICK 6 weeks ago.  She dislocated her hip and was unable to reduce in ED.  She was admitted for reduction.  Procedure went uneventfully.  She was admitted for PHP and pain control.  Cleared for discharge home POD #1.       Follow-Up: 10-14 days [facility]    Questions: Call [phone] or [phone]

## 2019-08-15 ENCOUNTER — TELEPHONE (OUTPATIENT)
Dept: CASE MANAGEMENT | Facility: HOSPITAL | Age: 54
End: 2019-08-15

## 2019-08-16 ENCOUNTER — TELEPHONE (OUTPATIENT)
Dept: CASE MANAGEMENT | Facility: HOSPITAL | Age: 54
End: 2019-08-16

## 2019-09-24 ENCOUNTER — ALLIED HEALTH/NURSE VISIT (OUTPATIENT)
Dept: AUDIOLOGY | Facility: OTHER | Age: 54
End: 2019-09-24
Attending: AUDIOLOGIST
Payer: MEDICARE

## 2019-09-24 DIAGNOSIS — H90.3 SENSORINEURAL HEARING LOSS (SNHL) OF BOTH EARS: Primary | ICD-10-CM

## 2019-09-24 PROCEDURE — V5266 BATTERY FOR HEARING DEVICE: HCPCS

## 2019-09-24 NOTE — PROGRESS NOTES
Per guidelines of patient's insurance, 32 units of size 312 batteries were dispensed today, Battery Modifier: NU (New). Reviewed that patent is eligible for batteries once every 90 days, and how they can request new batteries.    Cielo Andres  Audiology Assistant  [facility]  [phone]

## 2019-12-24 ENCOUNTER — ALLIED HEALTH/NURSE VISIT (OUTPATIENT)
Dept: AUDIOLOGY | Facility: OTHER | Age: 54
End: 2019-12-24
Attending: AUDIOLOGIST
Payer: MEDICARE

## 2019-12-24 DIAGNOSIS — H90.3 SENSORINEURAL HEARING LOSS (SNHL) OF BOTH EARS: Primary | ICD-10-CM

## 2019-12-24 PROCEDURE — V5266 BATTERY FOR HEARING DEVICE: HCPCS

## 2019-12-24 NOTE — PROGRESS NOTES
Per guidelines of patient's insurance, 32 units of size 312 batteries were dispensed today, Battery Modifier: NU (New). Reviewed that patent is eligible for batteries once every 90 days, and how they can request new batteries.    Cielo Andres  Audiology Assistant  [facility]  [phone]

## 2020-03-24 ENCOUNTER — ALLIED HEALTH/NURSE VISIT (OUTPATIENT)
Dept: AUDIOLOGY | Facility: OTHER | Age: 55
End: 2020-03-24
Attending: AUDIOLOGIST
Payer: MEDICARE

## 2020-03-24 DIAGNOSIS — H90.3 SENSORINEURAL HEARING LOSS (SNHL) OF BOTH EARS: Primary | ICD-10-CM

## 2020-03-24 PROCEDURE — V5266 BATTERY FOR HEARING DEVICE: HCPCS

## 2020-03-24 NOTE — PROGRESS NOTES
Per guidelines of patient's insurance, 32 units of size 312 batteries were dispensed today, Battery Modifier: NU (New). Reviewed that patent is eligible for batteries once every 90 days, and how they can request new batteries.    Cielo Andres  Audiology Assistant  [facility]  [phone]

## 2020-07-02 ENCOUNTER — ALLIED HEALTH/NURSE VISIT (OUTPATIENT)
Dept: AUDIOLOGY | Facility: OTHER | Age: 55
End: 2020-07-02
Attending: AUDIOLOGIST
Payer: MEDICARE

## 2020-07-02 DIAGNOSIS — H90.3 SENSORINEURAL HEARING LOSS (SNHL) OF BOTH EARS: Primary | ICD-10-CM

## 2020-07-02 PROCEDURE — V5266 BATTERY FOR HEARING DEVICE: HCPCS

## 2020-07-02 NOTE — PROGRESS NOTES
Per guidelines of patient's insurance, 32 units of size 312 batteries were dispensed today, Battery Modifier: NU (New). Reviewed that patent is eligible for batteries once every 90 days, and how they can request new batteries.    Cielo Andres  Audiology Assistant  [facility]  [phone]

## 2020-10-02 ENCOUNTER — ALLIED HEALTH/NURSE VISIT (OUTPATIENT)
Dept: AUDIOLOGY | Facility: OTHER | Age: 55
End: 2020-10-02
Attending: AUDIOLOGIST
Payer: MEDICARE

## 2020-10-02 DIAGNOSIS — H90.3 SENSORINEURAL HEARING LOSS (SNHL) OF BOTH EARS: Primary | ICD-10-CM

## 2020-10-02 PROCEDURE — V5266 BATTERY FOR HEARING DEVICE: HCPCS

## 2020-10-02 NOTE — PROGRESS NOTES
Per guidelines of patient's insurance, 32 units of size 312 batteries were dispensed today, Battery Modifier: NU (New). Reviewed that patent is eligible for batteries once every 90 days, and how they can request new batteries.    Cielo Andres  Audiology Assistant  [facility]  [phone]

## 2020-10-08 DIAGNOSIS — H91.93 DECREASED HEARING OF BOTH EARS: Primary | ICD-10-CM

## 2020-11-10 ENCOUNTER — OFFICE VISIT (OUTPATIENT)
Dept: OTOLARYNGOLOGY | Facility: OTHER | Age: 55
End: 2020-11-10
Attending: PHYSICIAN ASSISTANT
Payer: MEDICARE

## 2020-11-10 ENCOUNTER — OFFICE VISIT (OUTPATIENT)
Dept: AUDIOLOGY | Facility: OTHER | Age: 55
End: 2020-11-10
Attending: AUDIOLOGIST
Payer: MEDICARE

## 2020-11-10 VITALS
DIASTOLIC BLOOD PRESSURE: 60 MMHG | WEIGHT: 230 LBS | OXYGEN SATURATION: 97 % | BODY MASS INDEX: 39.48 KG/M2 | SYSTOLIC BLOOD PRESSURE: 122 MMHG | TEMPERATURE: 98.3 F | HEART RATE: 94 BPM

## 2020-11-10 DIAGNOSIS — H90.A32 MIXED CONDUCTIVE AND SENSORINEURAL HEARING LOSS OF LEFT EAR WITH RESTRICTED HEARING OF RIGHT EAR: Primary | ICD-10-CM

## 2020-11-10 DIAGNOSIS — H90.A32 MIXED CONDUCTIVE AND SENSORINEURAL HEARING LOSS OF LEFT EAR WITH RESTRICTED HEARING OF RIGHT EAR: ICD-10-CM

## 2020-11-10 DIAGNOSIS — Z98.890 HISTORY OF MYRINGOTOMY: ICD-10-CM

## 2020-11-10 DIAGNOSIS — H90.3 SENSORINEURAL HEARING LOSS (SNHL) OF BOTH EARS: ICD-10-CM

## 2020-11-10 DIAGNOSIS — H69.92 DYSFUNCTION OF LEFT EUSTACHIAN TUBE: ICD-10-CM

## 2020-11-10 DIAGNOSIS — H91.93 DECREASED HEARING OF BOTH EARS: ICD-10-CM

## 2020-11-10 DIAGNOSIS — H90.A21 SENSORINEURAL HEARING LOSS (SNHL) OF RIGHT EAR WITH RESTRICTED HEARING OF LEFT EAR: ICD-10-CM

## 2020-11-10 DIAGNOSIS — H72.92 PERFORATION OF TYMPANIC MEMBRANE, LEFT: Primary | ICD-10-CM

## 2020-11-10 PROCEDURE — 92591 HC HEARING AID EXAM BINAURAL: CPT | Performed by: AUDIOLOGIST

## 2020-11-10 PROCEDURE — V5275 EAR IMPRESSION: HCPCS | Mod: LT | Performed by: AUDIOLOGIST

## 2020-11-10 PROCEDURE — G0463 HOSPITAL OUTPT CLINIC VISIT: HCPCS | Mod: 25

## 2020-11-10 PROCEDURE — 92557 COMPREHENSIVE HEARING TEST: CPT | Performed by: AUDIOLOGIST

## 2020-11-10 PROCEDURE — 99213 OFFICE O/P EST LOW 20 MIN: CPT | Performed by: PHYSICIAN ASSISTANT

## 2020-11-10 PROCEDURE — 92567 TYMPANOMETRY: CPT | Performed by: AUDIOLOGIST

## 2020-11-10 ASSESSMENT — PAIN SCALES - GENERAL: PAINLEVEL: NO PAIN (0)

## 2020-11-10 NOTE — NURSING NOTE
"Chief Complaint   Patient presents with     RECHECK     Follow Up Left Tympanic Membrane Perforation, Bilateral SNHL       Initial /60   Pulse 94   Temp 98.3  F (36.8  C) (Tympanic)   Wt 104.3 kg (230 lb)   SpO2 97%   BMI 39.48 kg/m   Estimated body mass index is 39.48 kg/m  as calculated from the following:    Height as of 6/13/19: 1.626 m (5' 4\").    Weight as of this encounter: 104.3 kg (230 lb).  Medication Reconciliation: complete  Aniyah Westbrook LPN  "

## 2020-11-10 NOTE — PROGRESS NOTES
Chief Complaint   Patient presents with     RECHECK     Follow Up Left Tympanic Membrane Perforation, Bilateral SNHL       Fern returns for recheck of her ears, hearing. Fern returns for recheck and medical clearance for hearing aids.   She has some wax build up in her left ear and moisture at time.   Denies bloody, foul smelling drainage.   She has a hx of BTTI with Dr. Ruelas on 9/30/16. She was seen S/p BTT by JS, NP on 10/31/16 with normal postop exam.   I last saw her n 2019 and was doing well. Stable left TM perforation without otorrhea. Hearing has remained stable.    She denies active otorrhea, but does feel left ear has moisture at times.   Bilateral hearing aids with good success.   Denies otalgia.     Audiogram from 8/2018 reviewed with patient.   No flux HL or vertigo  Reviewed audiogram with patient today. Right ear stable, TYpe A tympanogram. Left ear with mixed loss, typ B large volume, c/w perforation.         Audiogram-  Type A Right   Type B large volume, perforation.   Thresholds are within 5-10 dB compared to 2018. Thresholds normal right to moderate-severe SNHL and left moderate to severe mixed hearing loss.     Past Medical History:   Diagnosis Date     Bicuspid aortic valve      Bicuspid aortic valve      Bipolar disorder (H) 6/26/2019     Depression      DM type 2 (diabetes mellitus, type 2) (H)      Elevated LDL cholesterol level 6/26/2019     HLA B27 (HLA B27 positive) 6/26/2019     Hyperlipidemia      Hypersomnolence disorder 6/26/2019     Menorrhagia with irregular cycle 6/26/2019     Nondependent alcohol abuse, unspecified drinking b 1/2/2001     PONV (postoperative nausea and vomiting)      Spondylolisthesis of lumbar region 6/26/2019     Status post right hip replacement 6/25/2019        Allergies   Allergen Reactions     Dulaglutide Other (See Comments)     confusion     Erythromycin Hives     Able to tolerate Zithromax:  Allergy     Januvia [Sitagliptin]      Joint pain,  nausea     Meloxicam Swelling     Swelling of lower extremeties; chest pain     Oxycodone      Suppressed respirations     Percocet [Oxycodone-Acetaminophen] Itching     Ritalin [Methylphenidate] Other (See Comments)     Over stimulation     Ritalin [Methylphenidate]      Tramadol      sedation     Tylenol With Codeine [Acetaminophen-Codeine] Nausea     Vistaril [Hydroxyzine]      Dizzy, ankle swelling       Vortioxetine Nausea     Headache, neck pain     Welchol [Colesevelam]      Joint pain, muscle weakness     Strattera [Atomoxetine] Anxiety     Valium [Diazepam] Rash     Current Outpatient Medications   Medication     canaliflozin (INVOKANA) tablet     celecoxib (CELEBREX) 200 MG capsule     medroxyPROGESTERone (PROVERA) 10 MG tablet     metFORMIN (GLUCOPHAGE) 1000 MG tablet     modafinil (PROVIGIL) 100 MG tablet     naproxen sodium 220 MG capsule     ondansetron (ZOFRAN) 4 MG tablet     vitamin B-Complex     HYDROcodone-acetaminophen (NORCO)  MG per tablet     No current facility-administered medications for this visit.       ROS: 10 point ROS neg other than the symptoms noted above in the HPI.  /60   Pulse 94   Temp 98.3  F (36.8  C) (Tympanic)   Wt 104.3 kg (230 lb)   SpO2 97%   BMI 39.48 kg/m    General - The patient is well nourished and well developed, and appears to have good nutritional status.  Alert and oriented to person and place, interactive.  Head and Face - Normocephalic and atraumatic, with no gross asymmetry noted of the contour of the facial features.  The facial nerve is intact, with strong symmetric movements.  Neck-no palpable lymphadenopathy or thyroid mass.  Trachea is midline.  Eyes - Extraocular movements intact.   Ears- External auditory canals are patent, tympanic membranes- Left canal clear. Left TM appears with perforation, 20% Anterior inferior quadrant. Middle ear appears healthy.   Right TM intact without effusion or retraction.   Nose - Nasal mucosa is pink and  moist with no abnormal mucus.  The septum was grossly midline aside from left septal spur with contact  turbinates enlarged with right jasen  No polyps, masses, or purulence noted on examination.   Mouth - Examination of the oral cavity shows pink, healthy, moist mucosa.  No lesions or ulceration noted.  The dentition are in good repair.  The tongue is mobile and midline.  Throat - The walls of the oropharynx were smooth, pink, moist, symmetric, and had no lesions or ulcerations.  The tonsillar pillars and soft palate were symmetric.  The uvula was midline on elevation.        ASSESSMENT:    ICD-10-CM    1. Perforation of tympanic membrane, left  H72.92    2. Sensorineural hearing loss (SNHL) of both ears  H90.3    3. Mixed conductive and sensorineural hearing loss of left ear with restricted hearing of right ear  H90.A32    4. History of myringotomy  Z98.890          Ears are stable at this time. Keep ears dry  Complete audiogram and medical clearance provided for HA.      Reviewed options of watchful waiting vs. Possible tympanoplasty.  Declined procedure.  Keep left ear dry  Monitor left ear.   RTC in 12 months. Return sooner if new symptoms develop.              Radhika Barron PA-C  ENT  [facility] [place]

## 2020-11-10 NOTE — LETTER
11/10/2020         RE: Elizabeth Rankin  [address]  [facility] [address]        Dear Colleague,    Thank you for referring your patient, Elizabeth Rankin, to the [facility]. Please see a copy of my visit note below.    Chief Complaint   Patient presents with     RECHECK     Follow Up Left Tympanic Membrane Perforation, Bilateral SNHL       Fern returns for recheck of her ears, hearing. Fern returns for recheck and medical clearance for hearing aids.   She has some wax build up in her left ear and moisture at time.   Denies bloody, foul smelling drainage.   She has a hx of BTTI with Dr. Ruelas on 9/30/16. She was seen S/p BTT by JS, NP on 10/31/16 with normal postop exam.   I last saw her n 2019 and was doing well. Stable left TM perforation without otorrhea. Hearing has remained stable.    She denies active otorrhea, but does feel left ear has moisture at times.   Bilateral hearing aids with good success.   Denies otalgia.     Audiogram from 8/2018 reviewed with patient.   No flux HL or vertigo  Reviewed audiogram with patient today. Right ear stable, TYpe A tympanogram. Left ear with mixed loss, typ B large volume, c/w perforation.         Audiogram-  Type A Right   Type B large volume, perforation.   Thresholds are within 5-10 dB compared to 2018. Thresholds normal right to moderate-severe SNHL and left moderate to severe mixed hearing loss.     Past Medical History:   Diagnosis Date     Bicuspid aortic valve      Bicuspid aortic valve      Bipolar disorder (H) 6/26/2019     Depression      DM type 2 (diabetes mellitus, type 2) (H)      Elevated LDL cholesterol level 6/26/2019     HLA B27 (HLA B27 positive) 6/26/2019     Hyperlipidemia      Hypersomnolence disorder 6/26/2019     Menorrhagia with irregular cycle 6/26/2019     Nondependent alcohol abuse, unspecified drinking b 1/2/2001     PONV (postoperative nausea and vomiting)      Spondylolisthesis of lumbar region 6/26/2019      Status post right hip replacement 6/25/2019        Allergies   Allergen Reactions     Dulaglutide Other (See Comments)     confusion     Erythromycin Hives     Able to tolerate Zithromax:  Allergy     Januvia [Sitagliptin]      Joint pain, nausea     Meloxicam Swelling     Swelling of lower extremeties; chest pain     Oxycodone      Suppressed respirations     Percocet [Oxycodone-Acetaminophen] Itching     Ritalin [Methylphenidate] Other (See Comments)     Over stimulation     Ritalin [Methylphenidate]      Tramadol      sedation     Tylenol With Codeine [Acetaminophen-Codeine] Nausea     Vistaril [Hydroxyzine]      Dizzy, ankle swelling       Vortioxetine Nausea     Headache, neck pain     Welchol [Colesevelam]      Joint pain, muscle weakness     Strattera [Atomoxetine] Anxiety     Valium [Diazepam] Rash     Current Outpatient Medications   Medication     canaliflozin (INVOKANA) tablet     celecoxib (CELEBREX) 200 MG capsule     medroxyPROGESTERone (PROVERA) 10 MG tablet     metFORMIN (GLUCOPHAGE) 1000 MG tablet     modafinil (PROVIGIL) 100 MG tablet     naproxen sodium 220 MG capsule     ondansetron (ZOFRAN) 4 MG tablet     vitamin B-Complex     HYDROcodone-acetaminophen (NORCO)  MG per tablet     No current facility-administered medications for this visit.       ROS: 10 point ROS neg other than the symptoms noted above in the HPI.  /60   Pulse 94   Temp 98.3  F (36.8  C) (Tympanic)   Wt 104.3 kg (230 lb)   SpO2 97%   BMI 39.48 kg/m    General - The patient is well nourished and well developed, and appears to have good nutritional status.  Alert and oriented to person and place, interactive.  Head and Face - Normocephalic and atraumatic, with no gross asymmetry noted of the contour of the facial features.  The facial nerve is intact, with strong symmetric movements.  Neck-no palpable lymphadenopathy or thyroid mass.  Trachea is midline.  Eyes - Extraocular movements intact.   Ears- External  auditory canals are patent, tympanic membranes- Left canal clear. Left TM appears with perforation, 20% Anterior inferior quadrant. Middle ear appears healthy.   Right TM intact without effusion or retraction.   Nose - Nasal mucosa is pink and moist with no abnormal mucus.  The septum was grossly midline aside from left septal spur with contact  turbinates enlarged with right jasen  No polyps, masses, or purulence noted on examination.   Mouth - Examination of the oral cavity shows pink, healthy, moist mucosa.  No lesions or ulceration noted.  The dentition are in good repair.  The tongue is mobile and midline.  Throat - The walls of the oropharynx were smooth, pink, moist, symmetric, and had no lesions or ulcerations.  The tonsillar pillars and soft palate were symmetric.  The uvula was midline on elevation.        ASSESSMENT:    ICD-10-CM    1. Perforation of tympanic membrane, left  H72.92    2. Sensorineural hearing loss (SNHL) of both ears  H90.3    3. Mixed conductive and sensorineural hearing loss of left ear with restricted hearing of right ear  H90.A32    4. History of myringotomy  Z98.890          Ears are stable at this time. Keep ears dry  Complete audiogram and medical clearance provided for HA.      Reviewed options of watchful waiting vs. Possible tympanoplasty.  Declined procedure.  Keep left ear dry  Monitor left ear.   RTC in 12 months. Return sooner if new symptoms develop.              Radhika Barron PA-C  ENT  [facility] [place]                Again, thank you for allowing me to participate in the care of your patient.        Sincerely,        Radhika Barron PA-C

## 2020-11-10 NOTE — PATIENT INSTRUCTIONS
Ears look well.   Perforation is stable on left.   Complete audiogram and hearing aid consult   Return in 1 year.       Thank you for allowing Radhika Barron PA-C and our ENT team to participate in your care.  If your medications are too expensive, please give the nurse a call.  We can possibly change this medication.  If you have a scheduling or an appointment question please contact our Health Unit Coordinator at their direct line [phone].   ALL nursing questions or concerns can be directed to your ENT nurse at: [phone] Nicole

## 2020-11-10 NOTE — PROGRESS NOTES
Audiology Evaluation Completed. Please refer SCANNED AUDIOGRAM and/or TYMPANOGRAM for BACKGROUND, RESULTS, RECOMMENDATIONS.      Ursula DANIELS, [facility]A  Audiologist #[id number]    BACKGROUND:  Elizabeth was seen today for consult regarding hearing aids. The patient notes difficulty with communication in a variety of listening situations and would like to explore possible benefit obtained via use of amplification.      Patient has received medical clearance for hearing aid use from Radhika Barron [facility].  Elizabeth is a binaural hearing aid candidate and will receive a Hearing Aid Recommendation today thru [facility] CONTRACT GUIDELINES .    RESULTS:  Audiology Assistant reviewed below information:      Estimated insurance coverage for hearing aids reviewed.    [place] [facility] form titled 'Legal rights and consumer information about purchasing a hearing instrument verbally reviewed and given to patient. Trial Period, cancellation fee, warranties highlighted. Patient risk factors have been provided to the patient in writing prior to the sale of the hearing aid per FDA regulation. The risk factors are also available in the User Instructional Booklet to be presented on the day of the hearing aid fitting.    ABN (Advanced Beneficiary Notice of Non-Coverage) completed and copy given to patient.       Hearing aid recommendation provided by Audiologist.    Thru use of hearing aids patient would like to improve ability to hear:    where there are groups and noise.     to hear the television at a lower volume to enjoy this activity with other people.     Elizabeth also reports trouble hearing in the car, at home, and on the telephone if there is not a volume control.    Grand daughter and children at school      Discussed hearing aid styles, technology, features, and options at length with Elizabeth.  Sample devices were shown. Pros/Cons of options reviewed.  Expectations for amplification discussed. .Also discussed  the importance of binaural amplification for hearing speech in the presence of background noise and localizing the directions of sounds.  Wireless options were also discussed at length and sample devices were shown.       Hearing Aid Recommendations: Hearing Aid Recommendation reviewed with patient. Pt chose to proceed with order and Purchase Agreement completed. Copies provided to patient.      Hearing Aids:  Binaural Unitron [id number]  Color: dark brown  Battery Size: R  Earmold/Tips: Custom c-shell right size 0-M and left size 0-P    Ear Impression(s):  Otoscopy revealed clear ear canals bilaterally and otoblock placed appropriately.      Ear Impressions were taken and no concerns thru otoscopic visualization after removal. Tthe hearing aid(s) and earmold(s) were ordered.     RECOMMENDATIONS:  The 45 day trial period was explained to patient, and they expressed understanding. Ms. Rankin signed the Hearing Aid Purchase Agreement and was given a copy, as well as details on her hearing aids. Patient was counseled that exact out of pocket amounts cannot be determined for hearing aid claims being sent to insurance. Any insurance coverage information presented to the patient is an estimate only, and is not a guarantee of payment. Patient has been advised to check with their own insurance.      Patient scheduled to return to clinic in 2 weeks for hearing aid fitting, programming and orientation.    Ursula Mcdowell M.S.,[facility]-A  Audiologist #[id number]

## 2020-11-25 ENCOUNTER — OFFICE VISIT (OUTPATIENT)
Dept: AUDIOLOGY | Facility: OTHER | Age: 55
End: 2020-11-25
Attending: AUDIOLOGIST
Payer: MEDICAID

## 2020-11-25 DIAGNOSIS — H90.6 MIXED CONDUCTIVE AND SENSORINEURAL HEARING LOSS OF BOTH EARS: Primary | ICD-10-CM

## 2020-11-25 PROCEDURE — V5011 HEARING AID FITTING/CHECKING: HCPCS | Mod: RT | Performed by: AUDIOLOGIST

## 2020-11-25 PROCEDURE — V5264 EAR MOLD/INSERT: HCPCS | Mod: RT | Performed by: AUDIOLOGIST

## 2020-11-25 PROCEDURE — V5160 DISPENSING FEE BINAURAL: HCPCS | Performed by: AUDIOLOGIST

## 2020-11-25 PROCEDURE — 92593 HC HEARING AID CHECK, BINAURAL: CPT | Performed by: AUDIOLOGIST

## 2020-11-25 PROCEDURE — V5261 HEARING AID, DIGIT, BIN, BTE: HCPCS | Mod: NU | Performed by: AUDIOLOGIST

## 2020-11-25 PROCEDURE — V5020 CONFORMITY EVALUATION: HCPCS | Mod: RT | Performed by: AUDIOLOGIST

## 2020-11-25 NOTE — PROGRESS NOTES
AUDIOLOGY REPORT    SUBJECTIVE: Elizabeth Rankin, a 55 year old female, was seen in the Audiology Clinic at [facility] today for a Binaural hearing aid fitting. Previous results have revealed a bilateral  mixed hearing loss.     OBJECTIVE:  Prior to fitting, a hearing aid check was performed to ensure device functionality. The hearing aid conformity evaluation was completed.The hearing aids were placed and they provided a good fit. Real-ear-probe-microphone measurements were completed on the [employer] system and were a good match to NAL-NL2 target with soft sounds audible, moderate sounds comfortable, and loud sounds below discomfort. UCLs are verified through maximum power output measures and demonstrate appropriate limiting of loud inputs.       Hearing Device Info  11/25/2020    Left Ear Make: Unitron   Left Ear Model #: Discover Moxi Move R 7   Left Ear Style: BTE   Left HA Warranty End Date: 12/19/2023   Left HA Serial #: [id number]   Right Ear Make: Unitron   Right Ear Model #: Discover Moxi Move R 7   Right Ear Style: BTE   Right HA Warranty End Date: 12/19/2023   Right HA Serial #: [id number]   Fitting Plan: Medical Assistance    11/25/2020         ASSESSMENT: Hearing aid fitting completed today. Verification measures were performed. Patient and/or caregiver demonstrated ability to insert and remove hearing aids and adjust volume toggle.    Ursula Mcdowell M.S., [facility]-A  Audiologist #[id number]      HEARING AID ORIENTATION/AUDIOLOGY ASSISTANT      Ms. Rankin was oriented to proper hearing aid use, care, cleaning (no water, dry brush), batteries (size R,  low-battery signal), aid insertion/removal, user booklet, warranty information, storage cases, and other hearing aid details. The patient confirmed understanding of hearing aid use and care, and showed proper insertion of hearing aid while in the office today. Ms. Rankin reported good volume and sound quality today.    Cielo Andres  Audiology  Assistant  [facility]  [phone]        PLAN: Ms. Rankin will return for follow-up in 2-3 weeks for a hearing aid review appointment. Please call this clinic with questions regarding today s appointment.

## 2020-12-17 ENCOUNTER — ALLIED HEALTH/NURSE VISIT (OUTPATIENT)
Dept: AUDIOLOGY | Facility: OTHER | Age: 55
End: 2020-12-17
Attending: AUDIOLOGIST
Payer: MEDICARE

## 2020-12-17 DIAGNOSIS — H90.6 MIXED CONDUCTIVE AND SENSORINEURAL HEARING LOSS OF BOTH EARS: Primary | ICD-10-CM

## 2020-12-17 PROCEDURE — V5299 HEARING SERVICE: HCPCS

## 2020-12-17 NOTE — PROGRESS NOTES
AUDIOLOGY ASSISTANT REPORT    SUBJECTIVE:Elizabeth Rankin is a 55 year old female who was seen in the Audiology Clinic at the [facility] on 12/17/2020  for a follow-up review of their hearing aids.  The patient has been seen previously in this clinic and was fit with Unitron Moxi Move R 7 hearing aids on 11/25/2020.  Elizabeth reports would like aids unlinked to control volume on aids separately and when aids are put in  overnight they do not turn on. Both aids have to go into the  for a second longer before they turn on.    OBJECTIVE:   A follow-up review was performed.  The hearing aid conformity evaluation was previously completed by the audiologist. Based on patient report, audiologist will unlink volume on aids and after talking to the vendor on aids not working after being in  overnight they are sending us a new  to give to patient.      Reviewed 45 day trial period, care, cleaning (no water, dry brush), batteries (size R) low battery signal), aid insertion/removal, volume adjustment (if applicable), user booklet, warranty information, storage cases, and other hearing aid details.       ASSESSMENT: A follow-up appointment for hearing aid(s) was completed today. Changes to hearing aid if shown was completed as outlined above.     PLAN:Elizabeth will return for follow-up as needed, or in 12 months. The patient will return for hearing aid checks as needed and in 12 months.  Please call this clinic with any questions regarding today s appointment.      Cielo Andres  Audiology Assistant  [facility]  [phone]

## 2020-12-18 ENCOUNTER — OFFICE VISIT (OUTPATIENT)
Dept: AUDIOLOGY | Facility: OTHER | Age: 55
End: 2020-12-18
Attending: AUDIOLOGIST
Payer: MEDICARE

## 2020-12-18 DIAGNOSIS — H90.6 MIXED CONDUCTIVE AND SENSORINEURAL HEARING LOSS OF BOTH EARS: Primary | ICD-10-CM

## 2020-12-18 PROCEDURE — V5299 HEARING SERVICE: HCPCS | Performed by: AUDIOLOGIST

## 2020-12-18 NOTE — PROGRESS NOTES
Background: Patient seen for reprogramming reporting left is not loud enough.    Results: Reviewed the [employer] options for link or unlinked volume control and the linked volume only on the aids/toggle.    Increased gain left but too much static/back ground amplification.  Reviewed preferences with volume. Patient chose to leave setting with left slightly louder than right. In the MILAD she prefers the right louder than left-by volume slide.    Recommendations: She will return if further programming.    Ursula Mcdowell M.S., [facility]-A  Audiologist #[id number]

## 2021-01-06 ENCOUNTER — OFFICE VISIT (OUTPATIENT)
Dept: AUDIOLOGY | Facility: OTHER | Age: 56
End: 2021-01-06
Attending: AUDIOLOGIST
Payer: MEDICARE

## 2021-01-06 DIAGNOSIS — H90.3 SENSORINEURAL HEARING LOSS (SNHL) OF BOTH EARS: Primary | ICD-10-CM

## 2021-01-06 PROCEDURE — V5299 HEARING SERVICE: HCPCS | Performed by: AUDIOLOGIST

## 2021-01-06 NOTE — PROGRESS NOTES
Background: Patient phoned audiology assistant with continued background amplification concerns. See previous encounter.    Results: Discussed concerns and  options with patient by telephone. Options to try another vendor, OtShyp OPNS 2 miniRITE R with custom molds or non-custom domes. She also may return hearing aids and return to wearing previous aids.     At this time reports she is keeping the aids unless we hear back by 1/15/21.    Recommendations: Patient will consider options and call if further concerns or questions.    Ursula Mcdowell M.S., [facility]-A  Audiologist #[id number]

## 2021-01-28 ENCOUNTER — TRANSFERRED RECORDS (OUTPATIENT)
Dept: HEALTH INFORMATION MANAGEMENT | Facility: OTHER | Age: 56
End: 2021-01-28

## 2021-01-28 LAB — PHQ9 SCORE: 18

## 2021-04-13 NOTE — MR AVS SNAPSHOT
"              After Visit Summary   2/15/2018    Elizabeth Rankin    MRN: [id number]           Patient Information     Date Of Birth          1965        Visit Information        Provider Department      2/15/2018 3:00 PM Radhika Barron PA-C [facility]        Care Instructions    Tube is out on her left ear.   There is a hole (perforation)- left ear. WIll monitor.   Recheck in 4-6 weeks with audiogram    Right tube is extruding and small perforation around tube.   Monitor. Consider removal. Removing tube would leave a larger hole possibly worsening hearing.   Consider MRI of ears if no improvement.     Thank you for allowing BRIAN Prieto and our ENT team to participate in your care.  If your medications are too expensive, please give the nurse a call.  We can possibly change this medication.  If you have a scheduling or an appointment question please contact [facility] Unit Coordinator at their direct line [phone].   ALL nursing questions or concerns can be directed to your ENT nurse at: [phone] [facility]              Follow-ups after your visit        Your next 10 appointments already scheduled     Feb 20, 2018  8:30 AM CST   (Arrive by 8:15 AM)   MA SCREENING BILATERAL W/ KAM with HCMA1   [facility] ([facility] )    [address] [address]   [phone]           Three-dimensional (3D) mammograms are available at [facility] locations in [facility], [facility], [facility], [place], [place], and [place]. [facility] locations include [place] [facility] in [place]. Benefits of 3D mammograms include: - Improved rate of cancer detection - Decreases your chance of having to go back for more tests, which means fewer: - \"False-positive\" results (This means that there is an abnormal area but it isn't cancer.) - Invasive testing procedures, such as a biopsy or surgery - Can provide clearer images " "Pt here by herself with c/o feeling palpitations for past 2 hours, pt denies any pain, pt reports feeling dizzy earlier today, VSS, ekg will be performed and pt out into waiting room to wait for ED room  ED Nursing Triage Note (General)   ________________________________    Nicky Hart is a 25 year old Female that presents to triage private car  With history of  palpitations reported by patient   Significant symptoms had onset 2 hour(s) ago.  /81   Pulse 114   Temp 97.7  F (36.5  C) (Temporal)   Resp 16   Ht 1.651 m (5' 5\")   Wt 69.4 kg (153 lb)   SpO2 98%   BMI 25.46 kg/m  t  Patient appears alert  and oriented, in no acute distress., and cooperative and pleasant behavior.    GCS Total = 15  Airway: intact  Breathing noted as Normal.  Circulation Normal  Skin normal, warm  Action taken:  Pt out into waiting room      PRE HOSPITAL PRIOR LIVING SITUATION Spouse    " "of the breast if you have dense breast tissue. 3D mammography is an optional exam that anyone can have with a 2D mammogram. It doesn't replace or take the place of a 2D mammogram. 2D mammograms remain an effective screening test for all women.  Not all insurance companies cover the cost of a 3D mammogram. Check with your insurance.              Future tests that were ordered for you today     Open Future Orders        Priority Expected Expires Ordered    MA Screen Bilateral w/Amilcar Routine  2019            Who to contact     If you have questions or need follow up information about today's clinic visit or your schedule please contact [facility] directly at [phone].  Normal or non-critical lab and imaging results will be communicated to you by MyChart, letter or phone within 4 business days after the clinic has received the results. If you do not hear from us within 7 days, please contact the clinic through [employer] or phone. If you have a critical or abnormal lab result, we will notify you by phone as soon as possible.  Submit refill requests through [employer] or call your pharmacy and they will forward the refill request to us. Please allow 3 business days for your refill to be completed.          Additional Information About Your Visit        [employer] Information     [employer] lets you send messages to your doctor, view your test results, renew your prescriptions, schedule appointments and more. To sign up, go to [email or web address] . Click on \"Log in\" on the left side of the screen, which will take you to the Welcome page. Then click on \"Sign up Now\" on the right side of the page.     You will be asked to enter the access code listed below, as well as some personal information. Please follow the directions to create your username and password.     Your access code is: [email or web address]  Expires: 2018  3:03 PM     Your access code will  in 90 days. If you need help or a new " "code, please call your [facility] clinic or [phone].        Care EveryWhere ID     This is your Care EveryWhere ID. This could be used by other organizations to access your [facility] medical records  [id number]        Your Vitals Were     Pulse Temperature Height Pulse Oximetry BMI (Body Mass Index)       84 98  F (36.7  C) (Tympanic) 5' 4\" (1.626 m) 98% 42.91 kg/m2        Blood Pressure from Last 3 Encounters:   02/15/18 142/76   10/31/16 130/76   09/30/16 148/88    Weight from Last 3 Encounters:   02/15/18 250 lb (113.4 kg)   10/31/16 250 lb (113.4 kg)   09/30/16 250 lb (113.4 kg)              Today, you had the following     No orders found for display       Primary Care Provider Office Phone # Fax #    Danae Yusuf [phone]       [facility] [address] [facility] [address]        Equal Access to Services     [facility] Center[facility] : Hadii nathanael ku hadasho Soomaali, waaxda luqadaha, qaybta kaalmada adeegyacatalina, omero gaytan . So [facility] [phone].    ATENCIÓN: Si habla español, tiene a lind disposición servicios gratuitos de asistencia lingüística. Llame al [phone].    We comply with applicable federal civil rights laws and [place] laws. We do not discriminate on the basis of race, color, national origin, age, disability, sex, sexual orientation, or gender identity.            Thank you!     Thank you for choosing [facility]  for your care. Our goal is always to provide you with excellent care. Hearing back from our patients is one way we can continue to improve our services. Please take a few minutes to complete the written survey that you may receive in the mail after your visit with us. Thank you!             Your Updated Medication List - Protect others around you: Learn how to safely use, store and throw away your medicines at www.disposemymeds.org.          This list is accurate as of 2/15/18  3:03 PM.  Always use your most " recent med list.                   Brand Name Dispense Instructions for use Diagnosis    adalimumab 40 MG/0.8ML prefilled syringe kit    HUMIRA     Inject 40 mg Subcutaneous every 7 days        CELEBREX PO           fluticasone 50 MCG/ACT spray    FLONASE    1 Bottle    Spray 2 sprays into both nostrils daily    Dysfunction of Eustachian tube, bilateral       IRON SUPPLEMENT PO      Take 325 mg by mouth daily (with breakfast)        metFORMIN 1000 MG tablet    GLUCOPHAGE     Take 1 tablet by mouth 2 times daily (with meals).        MULTIVITAMIN PO      Take 1 tablet by mouth daily        VITAMIN D3 PO      Take 5,000 Units by mouth daily

## 2021-09-13 ENCOUNTER — MEDICAL CORRESPONDENCE (OUTPATIENT)
Dept: MRI IMAGING | Facility: HOSPITAL | Age: 56
End: 2021-09-13

## 2021-09-24 ENCOUNTER — HOSPITAL ENCOUNTER (OUTPATIENT)
Dept: MRI IMAGING | Facility: HOSPITAL | Age: 56
Discharge: HOME | End: 2021-09-24
Attending: FAMILY MEDICINE | Admitting: FAMILY MEDICINE
Payer: MEDICARE

## 2021-09-24 DIAGNOSIS — M54.16 LUMBAR RADICULOPATHY: ICD-10-CM

## 2021-09-24 DIAGNOSIS — M54.50 LOW BACK PAIN: ICD-10-CM

## 2021-09-24 PROCEDURE — 72148 MRI LUMBAR SPINE W/O DYE: CPT

## 2021-10-19 ENCOUNTER — HOSPITAL ENCOUNTER (OUTPATIENT)
Dept: GENERAL RADIOLOGY | Facility: OTHER | Age: 56
Discharge: HOME | End: 2021-10-19
Attending: FAMILY MEDICINE | Admitting: FAMILY MEDICINE
Payer: MEDICARE

## 2021-10-19 DIAGNOSIS — M47.26 OTHER SPONDYLOSIS WITH RADICULOPATHY, LUMBAR REGION: ICD-10-CM

## 2021-10-19 PROCEDURE — 62323 NJX INTERLAMINAR LMBR/SAC: CPT

## 2021-10-19 PROCEDURE — 250N000011 HC RX IP 250 OP 636: Performed by: RADIOLOGY

## 2021-10-19 PROCEDURE — 250N000009 HC RX 250: Performed by: RADIOLOGY

## 2021-10-19 PROCEDURE — 255N000002 HC RX 255 OP 636: Performed by: RADIOLOGY

## 2021-10-19 RX ORDER — LIDOCAINE HYDROCHLORIDE 10 MG/ML
2 INJECTION, SOLUTION EPIDURAL; INFILTRATION; INTRACAUDAL; PERINEURAL ONCE
Status: COMPLETED | OUTPATIENT
Start: 2021-10-19 | End: 2021-10-19

## 2021-10-19 RX ORDER — DEXAMETHASONE SODIUM PHOSPHATE 10 MG/ML
10 INJECTION, SOLUTION INTRAMUSCULAR; INTRAVENOUS ONCE
Status: COMPLETED | OUTPATIENT
Start: 2021-10-19 | End: 2021-10-19

## 2021-10-19 RX ORDER — LIDOCAINE HYDROCHLORIDE 10 MG/ML
2 INJECTION, SOLUTION INFILTRATION; PERINEURAL ONCE
Status: COMPLETED | OUTPATIENT
Start: 2021-10-19 | End: 2021-10-19

## 2021-10-19 RX ADMIN — LIDOCAINE HYDROCHLORIDE 2 ML: 10 INJECTION, SOLUTION INFILTRATION; PERINEURAL at 10:36

## 2021-10-19 RX ADMIN — IOHEXOL 2 ML: 240 INJECTION, SOLUTION INTRATHECAL; INTRAVASCULAR; INTRAVENOUS; ORAL at 10:36

## 2021-10-19 RX ADMIN — LIDOCAINE HYDROCHLORIDE 2 ML: 10 INJECTION, SOLUTION EPIDURAL; INFILTRATION; INTRACAUDAL; PERINEURAL at 10:36

## 2021-10-19 RX ADMIN — DEXAMETHASONE SODIUM PHOSPHATE 10 MG: 10 INJECTION INTRAMUSCULAR; INTRAVENOUS at 10:36

## 2022-01-27 ENCOUNTER — MEDICAL CORRESPONDENCE (OUTPATIENT)
Dept: MRI IMAGING | Facility: HOSPITAL | Age: 57
End: 2022-01-27
Payer: MEDICARE

## 2022-01-31 ENCOUNTER — HOSPITAL ENCOUNTER (OUTPATIENT)
Dept: MRI IMAGING | Facility: HOSPITAL | Age: 57
Discharge: HOME | End: 2022-01-31
Attending: FAMILY MEDICINE | Admitting: FAMILY MEDICINE
Payer: MEDICARE

## 2022-01-31 DIAGNOSIS — M47.9 SPONDYLOSIS: ICD-10-CM

## 2022-01-31 DIAGNOSIS — M54.9 MID BACK PAIN: ICD-10-CM

## 2022-01-31 PROCEDURE — 72146 MRI CHEST SPINE W/O DYE: CPT

## 2022-06-09 ENCOUNTER — ALLIED HEALTH/NURSE VISIT (OUTPATIENT)
Dept: AUDIOLOGY | Facility: OTHER | Age: 57
End: 2022-06-09
Attending: AUDIOLOGIST
Payer: MEDICARE

## 2022-06-09 DIAGNOSIS — H91.93 DECREASED HEARING OF BOTH EARS: Primary | ICD-10-CM

## 2022-06-09 PROCEDURE — V5299 HEARING SERVICE: HCPCS

## 2022-06-09 NOTE — PROGRESS NOTES
HEARING AID CHECK    BACKGROUND:  Elizabeth Rankin, a 57 year old female, was seen today for an hearing aid check.  Ms. Rankin wears Binaural Unitron Discover Moxi Move R 7 hearing aids.  Ms. Rankin reported wax system broken and no function on both aids.    FINDINGS:  Visual inspection and cleaning done. Battery was tested and no function. No function listening check.    Reviewed cleaning, troubleshooting, and preventative care with patient. Any cleaning tools used were provided as customer courtesy.    Services performed and warranty reviewed with patient.    PLAN:  Based on patient report, no audiology appointment for adjustments needed.Ms. Rankin aids sent out for repair under warranty. Patient will be notified when aids come back from repair. Patient had no further questions and reports satisfaction.         Cielo Andres  Audiology Assistant  [facility]  [phone]

## 2022-06-15 ENCOUNTER — HOSPITAL ENCOUNTER (OUTPATIENT)
Dept: GENERAL RADIOLOGY | Facility: OTHER | Age: 57
Discharge: HOME | End: 2022-06-15
Attending: FAMILY MEDICINE | Admitting: FAMILY MEDICINE
Payer: MEDICARE

## 2022-06-15 DIAGNOSIS — M47.27 LUMBOSACRAL SPONDYLOSIS WITH RADICULOPATHY: ICD-10-CM

## 2022-06-15 PROCEDURE — 62323 NJX INTERLAMINAR LMBR/SAC: CPT

## 2022-06-15 PROCEDURE — 250N000009 HC RX 250: Performed by: STUDENT IN AN ORGANIZED HEALTH CARE EDUCATION/TRAINING PROGRAM

## 2022-06-15 PROCEDURE — 255N000002 HC RX 255 OP 636: Performed by: STUDENT IN AN ORGANIZED HEALTH CARE EDUCATION/TRAINING PROGRAM

## 2022-06-15 PROCEDURE — 250N000011 HC RX IP 250 OP 636: Performed by: STUDENT IN AN ORGANIZED HEALTH CARE EDUCATION/TRAINING PROGRAM

## 2022-06-15 RX ORDER — DEXAMETHASONE SODIUM PHOSPHATE 10 MG/ML
10 INJECTION, SOLUTION INTRAMUSCULAR; INTRAVENOUS ONCE
Status: COMPLETED | OUTPATIENT
Start: 2022-06-15 | End: 2022-06-15

## 2022-06-15 RX ORDER — LIDOCAINE HYDROCHLORIDE 10 MG/ML
2 INJECTION, SOLUTION EPIDURAL; INFILTRATION; INTRACAUDAL; PERINEURAL ONCE
Status: COMPLETED | OUTPATIENT
Start: 2022-06-15 | End: 2022-06-15

## 2022-06-15 RX ORDER — LIDOCAINE HYDROCHLORIDE 10 MG/ML
1 INJECTION, SOLUTION INFILTRATION; PERINEURAL ONCE
Status: COMPLETED | OUTPATIENT
Start: 2022-06-15 | End: 2022-06-15

## 2022-06-15 RX ADMIN — LIDOCAINE HYDROCHLORIDE 2 ML: 10 INJECTION, SOLUTION EPIDURAL; INFILTRATION; INTRACAUDAL; PERINEURAL at 11:30

## 2022-06-15 RX ADMIN — DEXAMETHASONE SODIUM PHOSPHATE 10 MG: 10 INJECTION, SOLUTION INTRAMUSCULAR; INTRAVENOUS at 11:30

## 2022-06-15 RX ADMIN — LIDOCAINE HYDROCHLORIDE 4 ML: 10 INJECTION, SOLUTION INFILTRATION; PERINEURAL at 11:30

## 2022-06-15 RX ADMIN — IOHEXOL 2 ML: 240 INJECTION, SOLUTION INTRATHECAL; INTRAVASCULAR; INTRAVENOUS; ORAL at 11:29

## 2022-06-20 ENCOUNTER — ALLIED HEALTH/NURSE VISIT (OUTPATIENT)
Dept: AUDIOLOGY | Facility: OTHER | Age: 57
End: 2022-06-20
Attending: AUDIOLOGIST
Payer: MEDICARE

## 2022-06-20 DIAGNOSIS — H91.93 DECREASED HEARING OF BOTH EARS: Primary | ICD-10-CM

## 2022-06-20 PROCEDURE — V5299 HEARING SERVICE: HCPCS

## 2022-06-20 NOTE — PROGRESS NOTES
Background:  Received repaired Binaural Unitron Discover Moxi Move R 7 hearing aids.       Procedures Performed:  The  completed the following repairs: replaced electronics, housing, , and wax guard. Reprogrammed to user settings.    Follow up:   Ms. Rankin was called for hearing aid  at .  Return to clinic as needed.      Cielo Andres  Audiology Assistant  [facility]  [phone]

## 2022-10-25 ENCOUNTER — ALLIED HEALTH/NURSE VISIT (OUTPATIENT)
Dept: AUDIOLOGY | Facility: OTHER | Age: 57
End: 2022-10-25
Attending: AUDIOLOGIST
Payer: MEDICARE

## 2022-10-25 DIAGNOSIS — H91.93 DECREASED HEARING OF BOTH EARS: Primary | ICD-10-CM

## 2022-10-25 PROCEDURE — V5299 HEARING SERVICE: HCPCS

## 2022-10-25 NOTE — PROGRESS NOTES
HEARING AID CHECK    BACKGROUND:  Elizabeth Rankin, a 57 year old female, was seen today for an hearing aid check.  Ms. Rankin wears Binaural Unitron [employer] M[employer]i Move 7 R hearing aids.  Ms. Rankin reported right aid not charging.    FINDINGS:  Visual inspection and cleaning provided. Right aid does not charge while in .    Reviewed cleaning, troubleshooting, and preventative care with patient. Any cleaning tools used were provided as customer courtesy.    Services performed and warranty reviewed with patient.    PLAN:  Based on patient report, no audiology appointment for adjustments needed.Ms. Rankin right hearing aid sent out for repair under warranty. Patient will be notified when aid comes back from repair. Patient had no further questions and reports satisfaction.         Cielo Andres  Audiology Assistant  [facility]  [phone]

## 2022-11-02 ENCOUNTER — ALLIED HEALTH/NURSE VISIT (OUTPATIENT)
Dept: AUDIOLOGY | Facility: OTHER | Age: 57
End: 2022-11-02
Attending: AUDIOLOGIST
Payer: MEDICARE

## 2022-11-02 DIAGNOSIS — H91.93 DECREASED HEARING OF BOTH EARS: Primary | ICD-10-CM

## 2022-11-02 PROCEDURE — V5299 HEARING SERVICE: HCPCS

## 2022-11-02 NOTE — PROGRESS NOTES
Background:  Received repaired Right Unitron [employer] Moxi Move R 7 hearing aid.       Procedures Performed:  The  completed the following repairs: replaced tube length, , wax guard, and electronics. Reprogrammed to user settings.    Follow up:   Ms. Rankin was called for hearing aid  at .  Return to clinic as needed.      Cielo Andres  Audiology Assistant  [facility]  [phone]

## 2022-11-04 NOTE — ED NOTES
"Patient presents with reports of L hip pain. States a hx of L hip replacement on 6/25/19. States she was sitting in her chair trying to get up on her R leg when she felt her L leg \"go out.\" Came via [facility] with [place] intercept. IV established - given Zofran 4mg at 1355, 0.5mg Dilaudid at 1350, Versed 1.25mg at 1407 and Dilaudid 0.5mg at 1419. L leg noticably shortened.   "
Dr Federico Ellsworth, BRIAN Husain, Monica JACINTO, RN and Mahsa, RT present for L hip reduction. See Procedural Sedation flowsheet. Patient tolerated well. Vitals as charted.  Unable to reduce L hip. Patient awake after sedation, alert, talking, Rates pain 4/10 at this time.  
I have personally seen and examined the patient. I have collaborated with and supervised the

## 2022-11-25 ENCOUNTER — HOSPITAL ENCOUNTER (OUTPATIENT)
Dept: GENERAL RADIOLOGY | Facility: OTHER | Age: 57
Discharge: HOME | End: 2022-11-25
Attending: FAMILY MEDICINE | Admitting: FAMILY MEDICINE
Payer: MEDICARE

## 2022-11-25 DIAGNOSIS — M47.27 LUMBOSACRAL SPONDYLOSIS WITH RADICULOPATHY: ICD-10-CM

## 2022-11-25 PROCEDURE — 250N000011 HC RX IP 250 OP 636: Performed by: STUDENT IN AN ORGANIZED HEALTH CARE EDUCATION/TRAINING PROGRAM

## 2022-11-25 PROCEDURE — 250N000009 HC RX 250: Performed by: STUDENT IN AN ORGANIZED HEALTH CARE EDUCATION/TRAINING PROGRAM

## 2022-11-25 PROCEDURE — 255N000002 HC RX 255 OP 636: Performed by: STUDENT IN AN ORGANIZED HEALTH CARE EDUCATION/TRAINING PROGRAM

## 2022-11-25 PROCEDURE — 62323 NJX INTERLAMINAR LMBR/SAC: CPT

## 2022-11-25 RX ORDER — LIDOCAINE HYDROCHLORIDE 10 MG/ML
20 INJECTION, SOLUTION INFILTRATION; PERINEURAL ONCE
Status: COMPLETED | OUTPATIENT
Start: 2022-11-25 | End: 2022-11-25

## 2022-11-25 RX ORDER — DEXAMETHASONE SODIUM PHOSPHATE 10 MG/ML
10 INJECTION, SOLUTION INTRAMUSCULAR; INTRAVENOUS ONCE
Status: COMPLETED | OUTPATIENT
Start: 2022-11-25 | End: 2022-11-25

## 2022-11-25 RX ORDER — LIDOCAINE HYDROCHLORIDE 10 MG/ML
2 INJECTION, SOLUTION EPIDURAL; INFILTRATION; INTRACAUDAL; PERINEURAL ONCE
Status: COMPLETED | OUTPATIENT
Start: 2022-11-25 | End: 2022-11-25

## 2022-11-25 RX ADMIN — IOHEXOL 2 ML: 240 INJECTION, SOLUTION INTRATHECAL; INTRAVASCULAR; INTRAVENOUS; ORAL at 12:25

## 2022-11-25 RX ADMIN — LIDOCAINE HYDROCHLORIDE 4 ML: 10 INJECTION, SOLUTION INFILTRATION; PERINEURAL at 12:25

## 2022-11-25 RX ADMIN — DEXAMETHASONE SODIUM PHOSPHATE 10 MG: 10 INJECTION, SOLUTION INTRAMUSCULAR; INTRAVENOUS at 12:26

## 2022-11-25 RX ADMIN — LIDOCAINE HYDROCHLORIDE 2 ML: 10 INJECTION, SOLUTION EPIDURAL; INFILTRATION; INTRACAUDAL; PERINEURAL at 12:25

## 2022-11-28 ENCOUNTER — ALLIED HEALTH/NURSE VISIT (OUTPATIENT)
Dept: AUDIOLOGY | Facility: OTHER | Age: 57
End: 2022-11-28
Attending: AUDIOLOGIST
Payer: MEDICARE

## 2022-11-28 ENCOUNTER — MEDICAL CORRESPONDENCE (OUTPATIENT)
Dept: MRI IMAGING | Facility: HOSPITAL | Age: 57
End: 2022-11-28

## 2022-11-28 DIAGNOSIS — H91.93 DECREASED HEARING OF BOTH EARS: Primary | ICD-10-CM

## 2022-11-28 PROCEDURE — V5299 HEARING SERVICE: HCPCS

## 2022-11-28 NOTE — PROGRESS NOTES
HEARING AID CHECK    BACKGROUND:  Elizabeth Rankin, a 57 year old female, was seen today for an hearing aid check.  Ms. Rankin wears Binaural Unitron [employer] Moxi Move 7 R hearing aids.  Ms. Rankin reported aids and  not working.    FINDINGS:  Visual inspection and cleaning provided.    Battery was tested and no function. No function on listening check.    Reviewed cleaning, troubleshooting, and preventative care with patient. Any cleaning tools used were provided as customer courtesy.    Services performed and warranty reviewed with patient.    PLAN:  Based on patient report, no audiology appointment for adjustments needed. Ms. Rankin aids sent out for repair with  unit both under warranty. Patient will be notified when aids and  unit come in. Patient had no further questions and reports satisfaction.         Cielo Andres  Audiology Assistant  [facility]  [phone]

## 2022-12-06 ENCOUNTER — HOSPITAL ENCOUNTER (OUTPATIENT)
Dept: MRI IMAGING | Facility: HOSPITAL | Age: 57
Discharge: HOME | End: 2022-12-06
Attending: FAMILY MEDICINE | Admitting: FAMILY MEDICINE
Payer: MEDICARE

## 2022-12-06 DIAGNOSIS — M45.9 ANKYLOSING SPONDYLITIS OF UNSPECIFIED SITES IN SPINE (H): ICD-10-CM

## 2022-12-06 PROCEDURE — 72148 MRI LUMBAR SPINE W/O DYE: CPT

## 2022-12-09 ENCOUNTER — ALLIED HEALTH/NURSE VISIT (OUTPATIENT)
Dept: AUDIOLOGY | Facility: OTHER | Age: 57
End: 2022-12-09
Attending: AUDIOLOGIST
Payer: MEDICARE

## 2022-12-09 DIAGNOSIS — H91.93 DECREASED HEARING OF BOTH EARS: Primary | ICD-10-CM

## 2022-12-09 PROCEDURE — V5299 HEARING SERVICE: HCPCS

## 2023-01-04 ENCOUNTER — HOSPITAL ENCOUNTER (EMERGENCY)
Facility: HOSPITAL | Age: 58
Discharge: HOME | End: 2023-01-04
Attending: INTERNAL MEDICINE | Admitting: INTERNAL MEDICINE
Payer: MEDICARE

## 2023-01-04 VITALS
SYSTOLIC BLOOD PRESSURE: 138 MMHG | OXYGEN SATURATION: 99 % | RESPIRATION RATE: 20 BRPM | HEART RATE: 87 BPM | DIASTOLIC BLOOD PRESSURE: 74 MMHG | TEMPERATURE: 98.2 F

## 2023-01-04 DIAGNOSIS — L30.9 DERMATITIS: ICD-10-CM

## 2023-01-04 LAB
ALBUMIN SERPL BCG-MCNC: 4.2 G/DL (ref 3.5–5.2)
ALP SERPL-CCNC: 62 U/L (ref 35–104)
ALT SERPL W P-5'-P-CCNC: 26 U/L (ref 10–35)
ANION GAP SERPL CALCULATED.3IONS-SCNC: 15 MMOL/L (ref 7–15)
AST SERPL W P-5'-P-CCNC: 21 U/L (ref 10–35)
BASOPHILS # BLD AUTO: 0.1 10E3/UL (ref 0–0.2)
BASOPHILS NFR BLD AUTO: 1 %
BILIRUB SERPL-MCNC: 0.6 MG/DL
BUN SERPL-MCNC: 10.2 MG/DL (ref 6–20)
CALCIUM SERPL-MCNC: 9 MG/DL (ref 8.6–10)
CHLORIDE SERPL-SCNC: 103 MMOL/L (ref 98–107)
CREAT SERPL-MCNC: 0.56 MG/DL (ref 0.51–0.95)
CRP SERPL-MCNC: 3.68 MG/L
DEPRECATED HCO3 PLAS-SCNC: 21 MMOL/L (ref 22–29)
EOSINOPHIL # BLD AUTO: 0.1 10E3/UL (ref 0–0.7)
EOSINOPHIL NFR BLD AUTO: 1 %
ERYTHROCYTE [DISTWIDTH] IN BLOOD BY AUTOMATED COUNT: 15 % (ref 10–15)
ERYTHROCYTE [SEDIMENTATION RATE] IN BLOOD BY WESTERGREN METHOD: 10 MM/HR (ref 0–30)
GFR SERPL CREATININE-BSD FRML MDRD: >90 ML/MIN/1.73M2
GLUCOSE SERPL-MCNC: 102 MG/DL (ref 70–99)
HCT VFR BLD AUTO: 43.3 % (ref 35–47)
HGB BLD-MCNC: 13.7 G/DL (ref 11.7–15.7)
HOLD SPECIMEN: NORMAL
IMM GRANULOCYTES # BLD: 0 10E3/UL
IMM GRANULOCYTES NFR BLD: 0 %
LYMPHOCYTES # BLD AUTO: 2.1 10E3/UL (ref 0.8–5.3)
LYMPHOCYTES NFR BLD AUTO: 27 %
MCH RBC QN AUTO: 26.2 PG (ref 26.5–33)
MCHC RBC AUTO-ENTMCNC: 31.6 G/DL (ref 31.5–36.5)
MCV RBC AUTO: 83 FL (ref 78–100)
MONOCYTES # BLD AUTO: 0.6 10E3/UL (ref 0–1.3)
MONOCYTES NFR BLD AUTO: 7 %
NEUTROPHILS # BLD AUTO: 5 10E3/UL (ref 1.6–8.3)
NEUTROPHILS NFR BLD AUTO: 64 %
NRBC # BLD AUTO: 0 10E3/UL
NRBC BLD AUTO-RTO: 0 /100
PLATELET # BLD AUTO: 343 10E3/UL (ref 150–450)
POTASSIUM SERPL-SCNC: 3.8 MMOL/L (ref 3.4–5.3)
PROCALCITONIN SERPL IA-MCNC: 0.04 NG/ML
PROT SERPL-MCNC: 7.1 G/DL (ref 6.4–8.3)
RBC # BLD AUTO: 5.23 10E6/UL (ref 3.8–5.2)
SODIUM SERPL-SCNC: 139 MMOL/L (ref 136–145)
WBC # BLD AUTO: 7.8 10E3/UL (ref 4–11)

## 2023-01-04 PROCEDURE — 99284 EMERGENCY DEPT VISIT MOD MDM: CPT

## 2023-01-04 PROCEDURE — 250N000013 HC RX MED GY IP 250 OP 250 PS 637: Performed by: INTERNAL MEDICINE

## 2023-01-04 PROCEDURE — 80053 COMPREHEN METABOLIC PANEL: CPT | Performed by: PHYSICIAN ASSISTANT

## 2023-01-04 PROCEDURE — 250N000012 HC RX MED GY IP 250 OP 636 PS 637: Performed by: INTERNAL MEDICINE

## 2023-01-04 PROCEDURE — 99284 EMERGENCY DEPT VISIT MOD MDM: CPT | Performed by: INTERNAL MEDICINE

## 2023-01-04 PROCEDURE — 86140 C-REACTIVE PROTEIN: CPT | Performed by: INTERNAL MEDICINE

## 2023-01-04 PROCEDURE — 36415 COLL VENOUS BLD VENIPUNCTURE: CPT | Performed by: INTERNAL MEDICINE

## 2023-01-04 PROCEDURE — 84145 PROCALCITONIN (PCT): CPT | Performed by: INTERNAL MEDICINE

## 2023-01-04 PROCEDURE — 85025 COMPLETE CBC W/AUTO DIFF WBC: CPT | Performed by: INTERNAL MEDICINE

## 2023-01-04 PROCEDURE — 85652 RBC SED RATE AUTOMATED: CPT | Performed by: INTERNAL MEDICINE

## 2023-01-04 RX ORDER — FAMOTIDINE 20 MG/1
40 TABLET, FILM COATED ORAL ONCE
Status: COMPLETED | OUTPATIENT
Start: 2023-01-04 | End: 2023-01-04

## 2023-01-04 RX ORDER — DIPHENHYDRAMINE HCL 25 MG
25 CAPSULE ORAL EVERY 6 HOURS PRN
Qty: 20 CAPSULE | Refills: 0 | Status: SHIPPED | OUTPATIENT
Start: 2023-01-04 | End: 2023-01-09

## 2023-01-04 RX ORDER — DIPHENHYDRAMINE HCL 25 MG
50 CAPSULE ORAL ONCE
Status: COMPLETED | OUTPATIENT
Start: 2023-01-04 | End: 2023-01-04

## 2023-01-04 RX ORDER — PREDNISONE 20 MG/1
40 TABLET ORAL ONCE
Status: COMPLETED | OUTPATIENT
Start: 2023-01-04 | End: 2023-01-04

## 2023-01-04 RX ORDER — PREDNISONE 20 MG/1
20 TABLET ORAL DAILY
Qty: 3 TABLET | Refills: 0 | Status: SHIPPED | OUTPATIENT
Start: 2023-01-04 | End: 2023-01-07

## 2023-01-04 RX ADMIN — DIPHENHYDRAMINE HYDROCHLORIDE 50 MG: 25 CAPSULE ORAL at 20:31

## 2023-01-04 RX ADMIN — FAMOTIDINE 40 MG: 20 TABLET ORAL at 20:31

## 2023-01-04 RX ADMIN — PREDNISONE 40 MG: 20 TABLET ORAL at 20:31

## 2023-01-04 NOTE — ED TRIAGE NOTES
Patient presents with c/o bilateral leg rashes/swelling. Symptoms started this AM, was seen at [facility] and sent here.

## 2023-01-05 ASSESSMENT — ENCOUNTER SYMPTOMS
LIGHT-HEADEDNESS: 0
DIAPHORESIS: 0
FEVER: 0
DYSURIA: 0
WHEEZING: 0
NUMBNESS: 0
BLOOD IN STOOL: 0
NECK STIFFNESS: 0
FLANK PAIN: 0
ABDOMINAL PAIN: 0
CHILLS: 0
ANAL BLEEDING: 0
ARTHRALGIAS: 0
BACK PAIN: 0
HEADACHES: 0
NECK PAIN: 0
PALPITATIONS: 0
DIZZINESS: 0
MYALGIAS: 0
COUGH: 0
ABDOMINAL DISTENTION: 0
COLOR CHANGE: 0
NAUSEA: 0
VOICE CHANGE: 0
CHEST TIGHTNESS: 0
WOUND: 0
HEMATURIA: 0
SHORTNESS OF BREATH: 0
VOMITING: 0
CONFUSION: 0

## 2023-01-06 NOTE — ED PROVIDER NOTES
History     Chief Complaint   Patient presents with     Circulatory Problem     The history is provided by the patient.   Rash  Location:  Leg  Leg rash location:  L leg, R leg, R lower leg and L lower leg  Quality: itchiness and redness    Severity:  Moderate  Duration:  2 days  Timing:  Constant  Progression:  Worsening  Chronicity:  New  Context comment:  New clothing  Relieved by:  Nothing  Associated symptoms: no abdominal pain, no fever, no headaches, no joint pain, no myalgias, no nausea, no shortness of breath, not vomiting and not wheezing          Allergies:  Allergies   Allergen Reactions     Dulaglutide Other (See Comments)     confusion     Erythromycin Hives     Able to tolerate Zithromax:  Allergy     Januvia [Sitagliptin]      Joint pain, nausea     Meloxicam Swelling     Swelling of lower extremeties; chest pain     Oxycodone      Suppressed respirations     Percocet [Oxycodone-Acetaminophen] Itching     Ritalin [Methylphenidate] Other (See Comments)     Over stimulation     Ritalin [Methylphenidate]      Tramadol      sedation     Tylenol With Codeine [Acetaminophen-Codeine] Nausea     Vistaril [Hydroxyzine]      Dizzy, ankle swelling       Vortioxetine Nausea     Headache, neck pain     Welchol [Colesevelam]      Joint pain, muscle weakness     Strattera [Atomoxetine] Anxiety     Valium [Diazepam] Rash       Problem List:    Patient Active Problem List    Diagnosis Date Noted     Closed dislocation of left hip (H) 08/05/2019     Priority: Medium     Hip dislocation, left (H) 08/05/2019     Priority: Medium     Hypersomnolence disorder 06/26/2019     Priority: Medium     Menorrhagia with irregular cycle 06/26/2019     Priority: Medium     Bipolar disorder (H) 06/26/2019     Priority: Medium     Spondylolisthesis of lumbar region 06/26/2019     Priority: Medium     Elevated LDL cholesterol level 06/26/2019     Priority: Medium     HLA B27 (HLA B27 positive) 06/26/2019     Priority: Medium      Status post right hip replacement 06/25/2019     Priority: Medium     Lumbar spondylosis 04/21/2017     Priority: Medium     ACP (advance care planning) 07/27/2016     Priority: Medium     Advance Care Planning 7/27/2016: ACP Review of Chart / Resources Provided:  Reviewed chart for advance care plan.  Elizabeth Rankin has no plan or code status on file. Discussed available resources and provided with information. Confirmed code status reflects current choices pending further ACP discussions.  Confirmed/documented legally designated decision makers.  Added by PRABHA LOYA             DM type 2 (diabetes mellitus, type 2) (H) 09/03/2013     Priority: Medium     Depression 09/03/2013     Priority: Medium        Past Medical History:    Past Medical History:   Diagnosis Date     Bicuspid aortic valve      Bicuspid aortic valve      Bipolar disorder (H) 6/26/2019     Depression      DM type 2 (diabetes mellitus, type 2) (H)      Elevated LDL cholesterol level 6/26/2019     HLA B27 (HLA B27 positive) 6/26/2019     Hyperlipidemia      Hypersomnolence disorder 6/26/2019     Menorrhagia with irregular cycle 6/26/2019     Nondependent alcohol abuse, unspecified drinking b 1/2/2001     PONV (postoperative nausea and vomiting)      Spondylolisthesis of lumbar region 6/26/2019     Status post right hip replacement 6/25/2019       Past Surgical History:    Past Surgical History:   Procedure Laterality Date     ARTHROPLASTY HIP ANTERIOR Left 6/25/2019    Procedure: DIRECT ANTERIOR LEFT TOTAL HIP ARTHROPLASTY;  Surgeon: Cirilo Miranda MD;  Location: [facility] OR     BIOPSY Right 10/30/2013    right breast biopsy; fibroadenoma     CLOSED REDUCTION HIP Left 8/6/2019    Procedure: CLOSED REDUCTION LEFT HIP;  Surgeon: Cirilo Miranda MD;  Location: [facility] OR     ORTHOPEDIC SURGERY  07/2014    Right total hip Dr. Meño Sullivan      ORTHOPEDIC SURGERY Right 06/16/2015    Right hip psoas release     ORTHOPEDIC SURGERY Left 09/2015    left hip  labrum debridement Dr. Meño Sullivan      TUBAL LIGATION         Family History:    Family History   Problem Relation Age of Onset     Depression Sister      Ankylosing Spondylitis Sister      Diabetes Mother      Hypertension Mother      Ankylosing Spondylitis Mother      Diabetes Father        Social History:  Marital Status:   [5]  Social History     Tobacco Use     Smoking status: Former     Packs/day: 1.00     Years: 25.00     Pack years: 25.00     Types: Cigarettes     Quit date:      Years since quittin.0     Smokeless tobacco: Never   Substance Use Topics     Alcohol use: Yes     Comment: formerly: 2 beers weekly     Drug use: Not Currently        Medications:    diphenhydrAMINE (BENADRYL) 25 MG capsule  predniSONE (DELTASONE) 20 MG tablet  canaliflozin (INVOKANA) tablet  celecoxib (CELEBREX) 200 MG capsule  HYDROcodone-acetaminophen (NORCO)  MG per tablet  medroxyPROGESTERone (PROVERA) 10 MG tablet  metFORMIN (GLUCOPHAGE) 1000 MG tablet  modafinil (PROVIGIL) 100 MG tablet  naproxen sodium 220 MG capsule  ondansetron (ZOFRAN) 4 MG tablet  vitamin B-Complex          Review of Systems   Constitutional: Negative for chills, diaphoresis and fever.   HENT: Negative for voice change.    Eyes: Negative for visual disturbance.   Respiratory: Negative for cough, chest tightness, shortness of breath and wheezing.    Cardiovascular: Negative for chest pain, palpitations and leg swelling.   Gastrointestinal: Negative for abdominal distention, abdominal pain, anal bleeding, blood in stool, nausea and vomiting.   Genitourinary: Negative for decreased urine volume, dysuria, flank pain and hematuria.   Musculoskeletal: Negative for arthralgias, back pain, gait problem, myalgias, neck pain and neck stiffness.   Skin: Positive for rash. Negative for color change, pallor and wound.   Neurological: Negative for dizziness, syncope, light-headedness, numbness and headaches.   Psychiatric/Behavioral:  Negative for confusion and suicidal ideas.       Physical Exam   BP: 142/86  Pulse: 97  Temp: 98.2  F (36.8  C)  Resp: 18  SpO2: 99 %      Physical Exam  Vitals and nursing note reviewed.   Constitutional:       Appearance: She is well-developed.   HENT:      Head: Normocephalic and atraumatic.      Mouth/Throat:      Pharynx: No oropharyngeal exudate.   Eyes:      Conjunctiva/sclera: Conjunctivae normal.      Pupils: Pupils are equal, round, and reactive to light.   Neck:      Thyroid: No thyromegaly.      Vascular: No JVD.      Trachea: No tracheal deviation.   Cardiovascular:      Rate and Rhythm: Normal rate and regular rhythm.      Heart sounds: Normal heart sounds. No murmur heard.    No friction rub. No gallop.   Pulmonary:      Effort: Pulmonary effort is normal. No respiratory distress.      Breath sounds: Normal breath sounds. No stridor. No wheezing or rales.   Chest:      Chest wall: No tenderness.   Abdominal:      General: Bowel sounds are normal. There is no distension.      Palpations: Abdomen is soft. There is no mass.      Tenderness: There is no abdominal tenderness. There is no guarding or rebound.   Musculoskeletal:         General: No tenderness. Normal range of motion.      Cervical back: Normal range of motion and neck supple.   Lymphadenopathy:      Cervical: No cervical adenopathy.   Skin:     General: Skin is warm and dry.      Coloration: Skin is not pale.      Findings: Erythema present. No rash.             Comments: Multiple erythematous patches, about 2 x 1 cm on both right and left leg, mostly on lower legs   Neurological:      Mental Status: She is alert and oriented to person, place, and time.   Psychiatric:         Behavior: Behavior normal.         ED Course       No results found for this or any previous visit (from the past 24 hour(s)).    Medications   predniSONE (DELTASONE) tablet 40 mg (40 mg Oral Given 1/4/23 2031)   diphenhydrAMINE (BENADRYL) capsule 50 mg (50 mg Oral  Given 1/4/23 2031)   famotidine (PEPCID) tablet 40 mg (40 mg Oral Given 1/4/23 2031)       Assessments & Plan (with Medical Decision Making)   Leg rash after wearing new pajama  Sent from clinic to rule out vascultitis    Labs reviewed  Pt does not have any systematic symptoms indicating vasculitis and lab findings not indicating any serous inflammatory process  I advised follow-up with PCP for reevaluation of rash, skin biopsy if lesion persisted    prednisone + benadryl started      I have reviewed the nursing notes.    I have reviewed the findings, diagnosis, plan and need for follow up with the patient.          Discharge Medication List as of 1/4/2023  8:28 PM      START taking these medications    Details   diphenhydrAMINE (BENADRYL) 25 MG capsule Take 1 capsule (25 mg) by mouth every 6 hours as needed for itching or allergies, Disp-20 capsule, R-0, E-Prescribe      predniSONE (DELTASONE) 20 MG tablet Take 1 tablet (20 mg) by mouth daily for 3 days, Disp-3 tablet, R-0, E-Prescribe             Final diagnoses:   Dermatitis       1/4/2023   [facility]     David Gar MD  01/05/23 2008

## 2023-03-06 ENCOUNTER — MEDICAL CORRESPONDENCE (OUTPATIENT)
Dept: MAMMOGRAPHY | Facility: OTHER | Age: 58
End: 2023-03-06

## 2023-03-31 ENCOUNTER — TELEPHONE (OUTPATIENT)
Dept: MAMMOGRAPHY | Facility: OTHER | Age: 58
End: 2023-03-31

## 2023-03-31 ENCOUNTER — ANCILLARY PROCEDURE (OUTPATIENT)
Dept: MAMMOGRAPHY | Facility: OTHER | Age: 58
End: 2023-03-31
Attending: FAMILY MEDICINE
Payer: MEDICARE

## 2023-03-31 DIAGNOSIS — Z12.31 VISIT FOR SCREENING MAMMOGRAM: ICD-10-CM

## 2023-03-31 PROCEDURE — 77067 SCR MAMMO BI INCL CAD: CPT | Mod: TC

## 2023-06-01 ENCOUNTER — ALLIED HEALTH/NURSE VISIT (OUTPATIENT)
Dept: AUDIOLOGY | Facility: OTHER | Age: 58
End: 2023-06-01
Attending: AUDIOLOGIST
Payer: MEDICARE

## 2023-06-01 DIAGNOSIS — H91.93 DECREASED HEARING OF BOTH EARS: Primary | ICD-10-CM

## 2023-06-01 PROCEDURE — V5299 HEARING SERVICE: HCPCS

## 2023-06-01 NOTE — PROGRESS NOTES
HEARING AID CHECK    BACKGROUND:  Elizabeth Rankin, a 58 year old female, was seen today for an hearing aid check.  Ms. Rankin wears Binaural Unitron [employer] Moxi Move R 7 hearing aids.  Ms. Rankin reported aids not charging and has to wiggle aids in  to try to get them to work.    FINDINGS:  Visual inspection and cleaning provided.    Battery was tested and no function. No function on listening check.    Reviewed cleaning, troubleshooting, and preventative care with patient. Any cleaning tools used were provided as customer courtesy.    Services performed and warranty reviewed with patient.    PLAN:  Based on patient report, no audiology appointment for adjustments needed.Ms. Rankin hearing aids and  sent in for repair under warranty. Patient will be notified when aids come back from repair. Patient had no further questions and reports satisfaction.           Cielo Andres  Audiology Assistant  [facility]  [phone]

## 2023-06-21 ENCOUNTER — ALLIED HEALTH/NURSE VISIT (OUTPATIENT)
Dept: AUDIOLOGY | Facility: OTHER | Age: 58
End: 2023-06-21
Attending: AUDIOLOGIST
Payer: MEDICARE

## 2023-06-21 DIAGNOSIS — H91.93 DECREASED HEARING OF BOTH EARS: Primary | ICD-10-CM

## 2023-06-21 PROCEDURE — V5299 HEARING SERVICE: HCPCS

## 2023-08-02 ENCOUNTER — MEDICAL CORRESPONDENCE (OUTPATIENT)
Dept: INTERVENTIONAL RADIOLOGY/VASCULAR | Facility: HOSPITAL | Age: 58
End: 2023-08-02

## 2023-08-03 RX ORDER — FLUCONAZOLE 150 MG/1
1 TABLET ORAL
COMMUNITY
Start: 2023-01-23

## 2023-08-03 RX ORDER — DIPHENHYDRAMINE HCI 25 MG/1
TABLET, FILM COATED ORAL
COMMUNITY
Start: 2023-01-05

## 2023-08-03 RX ORDER — CLINDAMYCIN PHOSPHATE 10 UG/ML
LOTION TOPICAL
COMMUNITY
Start: 2023-03-06

## 2023-08-03 RX ORDER — ADALIMUMAB 40MG/0.4ML
KIT SUBCUTANEOUS
COMMUNITY
Start: 2023-01-11

## 2023-08-09 ENCOUNTER — TELEPHONE (OUTPATIENT)
Dept: INTERVENTIONAL RADIOLOGY/VASCULAR | Facility: HOSPITAL | Age: 58
End: 2023-08-09

## 2023-08-09 NOTE — TELEPHONE ENCOUNTER
Called patient to remind them of their appt on 8/11. Also reminded patient to not take any antibiotics, steroids, or immunizations two weeks before and after this appt.  And they also need a .      AMANDA GOLDSMITH

## 2023-08-11 ENCOUNTER — HOSPITAL ENCOUNTER (OUTPATIENT)
Facility: HOSPITAL | Age: 58
Discharge: HOME | End: 2023-08-11
Attending: RADIOLOGY | Admitting: RADIOLOGY
Payer: MEDICARE

## 2023-08-11 ENCOUNTER — HOSPITAL ENCOUNTER (OUTPATIENT)
Dept: GENERAL RADIOLOGY | Facility: HOSPITAL | Age: 58
Discharge: HOME | End: 2023-08-11
Attending: FAMILY MEDICINE | Admitting: RADIOLOGY
Payer: MEDICARE

## 2023-08-11 DIAGNOSIS — M47.26 OTHER SPONDYLOSIS WITH RADICULOPATHY, LUMBAR REGION: ICD-10-CM

## 2023-08-11 DIAGNOSIS — M45.9 ANKYLOSING SPONDYLITIS (H): ICD-10-CM

## 2023-08-11 DIAGNOSIS — M43.16 SPONDYLOLISTHESIS OF LUMBAR REGION: ICD-10-CM

## 2023-08-11 PROCEDURE — 250N000011 HC RX IP 250 OP 636: Mod: JZ | Performed by: RADIOLOGY

## 2023-08-11 PROCEDURE — 272N000472 XR LUMBAR TRANSLAMINAR INJ INCL IMAGING

## 2023-08-11 RX ORDER — DEXAMETHASONE SODIUM PHOSPHATE 10 MG/ML
10 INJECTION, SOLUTION INTRAMUSCULAR; INTRAVENOUS ONCE
Status: COMPLETED | OUTPATIENT
Start: 2023-08-11 | End: 2023-08-11

## 2023-08-11 RX ORDER — IOPAMIDOL 612 MG/ML
15 INJECTION, SOLUTION INTRATHECAL ONCE
Status: COMPLETED | OUTPATIENT
Start: 2023-08-11 | End: 2023-08-11

## 2023-08-11 RX ADMIN — DEXAMETHASONE SODIUM PHOSPHATE 10 MG: 10 INJECTION, SOLUTION INTRAMUSCULAR; INTRAVENOUS at 14:18

## 2023-08-11 RX ADMIN — IOPAMIDOL 3 ML: 612 INJECTION, SOLUTION INTRATHECAL at 14:18

## 2023-08-11 ASSESSMENT — ACTIVITIES OF DAILY LIVING (ADL)
ADLS_ACUITY_SCORE: 35
ADLS_ACUITY_SCORE: 35

## 2023-08-11 NOTE — DISCHARGE INSTRUCTIONS
Home number on file [phone] (home)  Is it ok to leave a message at this number(s)? Yes    DR. JUARES completed your procedure on 8/11/2023.    Current Pain Level (0-10 Scale): 5/10  Post Pain Level (0-10):  4/10    Radiology Discharge instructions for Steroid Injection    Activity Level:     Do not do any heavy activity or exercise for 24 hours.   Do not drive for 4 hours after your injection.  Diet:   Return to your normal diet.  Medications:   If you have stopped taking your Aspirin, Coumadin/Warfarin, Ibuprofen, or any   other blood thinner for this procedure you may resume in the morning unless   your primary care provider has given you other instructions.    Diabetics may see an increase in blood sugar after steroid injections. If you are concerned about your blood sugar, please contact your family doctor.    Site Care:  Remove the bandage and bathe or shower the morning after the procedure.      Please allow two weeks to experience improvement in your pain.  If you have any further issues, please contact your provider.    Call your Primary Care Provider if you have the following (if your primary care provider is not available please seek emergency care):   Nausea with vomiting   Severe headache   Drowsiness or confusion   Redness or drainage at the injection or puncture site   Temperature over 101 degrees F   Other concerns   Worsening back pain   Stiff neck

## 2023-08-24 ENCOUNTER — TELEPHONE (OUTPATIENT)
Dept: INTERVENTIONAL RADIOLOGY/VASCULAR | Facility: HOSPITAL | Age: 58
End: 2023-08-24

## 2023-08-24 NOTE — TELEPHONE ENCOUNTER
INJECTION POST CALL    Procedure: Epidural TL L5-S1  Radiologist(s): DR. AZUL JUARES  Date of Procedure:  8/11/23    Relief of pain from this injection    A = 90%  A- = 85%  B = 80%  B- =75%  C = 70%  C- = 65%  D = 60%  D- = 50%  F = less than 50%    Do you feel this injection was beneficial? No  If yes, how long did it last? Patient only got 20% of relief. She feels that her sciatica has gotten worse since injection    Instruct patient to follow up with their provider for any further care they may need. (as Dr. Jimenez will no longer be making recommendations nor addended the exam with recommmendations)    Alesia Virgen

## 2023-11-28 ENCOUNTER — MEDICAL CORRESPONDENCE (OUTPATIENT)
Dept: HEALTH INFORMATION MANAGEMENT | Facility: HOSPITAL | Age: 58
End: 2023-11-28

## 2023-12-07 ENCOUNTER — ALLIED HEALTH/NURSE VISIT (OUTPATIENT)
Dept: AUDIOLOGY | Facility: OTHER | Age: 58
End: 2023-12-07
Attending: NURSE PRACTITIONER
Payer: MEDICARE

## 2023-12-07 DIAGNOSIS — H91.93 DECREASED HEARING OF BOTH EARS: Primary | ICD-10-CM

## 2023-12-07 PROCEDURE — V5299 HEARING SERVICE: HCPCS

## 2023-12-07 NOTE — PROGRESS NOTES
HEARING AID CHECK    BACKGROUND:  Elizabeth Rankni, a 58 year old female, was seen today for an hearing aid check.  Ms. Rankin wears Binaural Unitron [employer] Moxi Move R 7 hearing aids.  Ms. Rankin reported right aid not charging.    FINDINGS:  Visual inspection and cleaning provided.   C-stop showed cerumen impaction and changed with new. Battery was tested and no function. No function on  listening check.    Reviewed cleaning, troubleshooting, and preventative care with patient. Any cleaning tools used were provided as customer courtesy.    Services performed and warranty reviewed with patient.    PLAN:  Based on patient report, no audiology appointment for adjustments needed.Ms. Rankin right aid sent out for repair under warranty. Patient will be notified when aid comes back from repair. Patient had no further questions and reports satisfaction.         Cielo Andres  Audiology Assistant  [facility]  [phone]

## 2023-12-20 ENCOUNTER — ALLIED HEALTH/NURSE VISIT (OUTPATIENT)
Dept: AUDIOLOGY | Facility: OTHER | Age: 58
End: 2023-12-20
Attending: AUDIOLOGIST
Payer: MEDICARE

## 2023-12-20 DIAGNOSIS — H91.93 DECREASED HEARING OF BOTH EARS: Primary | ICD-10-CM

## 2023-12-20 PROCEDURE — V5299 HEARING SERVICE: HCPCS

## 2023-12-20 NOTE — PROGRESS NOTES
Background:  Received repaired Right Unitron [employer] Moxi Move R 7 hearing aid.       Procedures Performed:  The  completed the following repairs: replaced electronics, housing, ,  tube length, tubing for  and wiring. Reprogrammed to user settings.    Follow up:   Ms. Rankin was called for hearing aid  at .  Return to clinic as needed.      Cielo Andres  Audiology Assistant  [facility]  [phone]

## 2023-12-27 ENCOUNTER — HOSPITAL ENCOUNTER (OUTPATIENT)
Facility: HOSPITAL | Age: 58
Discharge: HOME | End: 2023-12-27
Attending: RADIOLOGY | Admitting: RADIOLOGY
Payer: MEDICARE

## 2023-12-27 ENCOUNTER — HOSPITAL ENCOUNTER (OUTPATIENT)
Dept: GENERAL RADIOLOGY | Facility: HOSPITAL | Age: 58
Discharge: HOME | End: 2023-12-27
Attending: FAMILY MEDICINE
Payer: MEDICARE

## 2023-12-27 DIAGNOSIS — M47.817 SPONDYLOSIS OF LUMBOSACRAL REGION WITHOUT MYELOPATHY OR RADICULOPATHY: ICD-10-CM

## 2023-12-27 DIAGNOSIS — M47.27 OTHER SPONDYLOSIS WITH RADICULOPATHY, LUMBOSACRAL REGION: ICD-10-CM

## 2023-12-27 PROCEDURE — 250N000011 HC RX IP 250 OP 636: Performed by: RADIOLOGY

## 2023-12-27 PROCEDURE — 272N000472 XR LUMBAR TRANSLAMINAR INJ INCL IMAGING

## 2023-12-27 RX ORDER — IOPAMIDOL 612 MG/ML
15 INJECTION, SOLUTION INTRATHECAL ONCE
Status: COMPLETED | OUTPATIENT
Start: 2023-12-27 | End: 2023-12-27

## 2023-12-27 RX ORDER — DEXAMETHASONE SODIUM PHOSPHATE 10 MG/ML
10 INJECTION, SOLUTION INTRAMUSCULAR; INTRAVENOUS ONCE
Status: COMPLETED | OUTPATIENT
Start: 2023-12-27 | End: 2023-12-27

## 2023-12-27 RX ADMIN — DEXAMETHASONE SODIUM PHOSPHATE 10 MG: 10 INJECTION, SOLUTION INTRAMUSCULAR; INTRAVENOUS at 10:52

## 2023-12-27 RX ADMIN — IOPAMIDOL 5 ML: 612 INJECTION, SOLUTION INTRATHECAL at 10:52

## 2023-12-27 NOTE — DISCHARGE INSTRUCTIONS
Home number on file [phone] (home)  Is it ok to leave a message at this number(s)? Yes    Dr Yoder completed your procedure on 12/27/2023.    Current Pain Level (0-10 Scale): 2/10  Post Pain Level (0-10):  3/10    Radiology Discharge instructions for Steroid Injection    Activity Level:     Do not do any heavy activity or exercise for 24 hours.   Do not drive for 4 hours after your injection.  Diet:   Return to your normal diet.  Medications:   If you have stopped taking your Aspirin, Coumadin/Warfarin, Ibuprofen, or any   other blood thinner for this procedure you may resume in the morning unless   your primary care provider has given you other instructions.    Diabetics may see an increase in blood sugar after steroid injections. If you are concerned about your blood sugar, please contact your family doctor.    Site Care:  Remove the bandage and bathe or shower the morning after the procedure.      This is a Pain Management procedure.  You will be contacted in two weeks for follow up.    Call your Primary Care Provider if you have the following (if your primary care provider is not available please seek emergency care):   Nausea with vomiting   Severe headache   Drowsiness or confusion   Redness or drainage at the injection or puncture site   Temperature over 101 degrees F   Other concerns   Worsening back pain   Stiff neck

## 2024-01-08 ENCOUNTER — OFFICE VISIT (OUTPATIENT)
Dept: OTOLARYNGOLOGY | Facility: OTHER | Age: 59
End: 2024-01-08
Attending: NURSE PRACTITIONER
Payer: MEDICARE

## 2024-01-08 VITALS
OXYGEN SATURATION: 97 % | TEMPERATURE: 97.9 F | SYSTOLIC BLOOD PRESSURE: 151 MMHG | HEART RATE: 70 BPM | DIASTOLIC BLOOD PRESSURE: 84 MMHG | WEIGHT: 229.94 LBS | BODY MASS INDEX: 39.26 KG/M2 | HEIGHT: 64 IN

## 2024-01-08 DIAGNOSIS — Z97.4 HEARING AID WORN: ICD-10-CM

## 2024-01-08 DIAGNOSIS — H90.6 MIXED CONDUCTIVE AND SENSORINEURAL HEARING LOSS OF BOTH EARS: ICD-10-CM

## 2024-01-08 DIAGNOSIS — H81.11 BENIGN PAROXYSMAL POSITIONAL VERTIGO OF RIGHT EAR: ICD-10-CM

## 2024-01-08 DIAGNOSIS — H90.3 SENSORINEURAL HEARING LOSS (SNHL) OF BOTH EARS: ICD-10-CM

## 2024-01-08 DIAGNOSIS — H72.92 PERFORATION OF TYMPANIC MEMBRANE, LEFT: Primary | ICD-10-CM

## 2024-01-08 DIAGNOSIS — H81.10: ICD-10-CM

## 2024-01-08 PROCEDURE — 92504 EAR MICROSCOPY EXAMINATION: CPT | Performed by: NURSE PRACTITIONER

## 2024-01-08 PROCEDURE — G0463 HOSPITAL OUTPT CLINIC VISIT: HCPCS

## 2024-01-08 PROCEDURE — 99213 OFFICE O/P EST LOW 20 MIN: CPT | Mod: 25 | Performed by: NURSE PRACTITIONER

## 2024-01-08 RX ORDER — BLOOD SUGAR DIAGNOSTIC
STRIP MISCELLANEOUS
COMMUNITY
Start: 2023-11-16

## 2024-01-08 RX ORDER — HYDROXYCHLOROQUINE SULFATE 200 MG/1
TABLET, FILM COATED ORAL
COMMUNITY
Start: 2024-01-06

## 2024-01-08 RX ORDER — LEFLUNOMIDE 10 MG/1
TABLET ORAL
COMMUNITY
Start: 2024-01-04

## 2024-01-08 ASSESSMENT — PAIN SCALES - GENERAL: PAINLEVEL: NO PAIN (0)

## 2024-01-08 NOTE — PROGRESS NOTES
Otolaryngology Note         Chief Complaint:     Patient presents with:  Cerumen Impaction           History of Present Illness:     Elizabeth Rankin is a 58 year old female who presents today for ear cleaning.  She was last seen in ENT on 11/10/2020 by Radhika TORRES for routine follow-up.    She has a history of PE tube on left with subsequent perforation.  She reports occasional otorrhea.  She wakes with a wet ear when she sleeps on the left.  She keeps the left ear dry.    Right ear feels plugged all the time.  She is able to valsalva and open it.      She reports gradual change in hearing over time  No concerns for bothersome tinnitus  She has been experiencing rotary vertigo with laying flat and turning her head to the right.  Rotary vertigo is short-lived, less than 1 minute.  She has not previously tried any repositioning exercises.    No facial numbness or weakness.      No otalgia, or chaparro otorrhea, feels moisture in her ear in the morning..      Last audiogram completed 11/10/2020:  Tympanograms-right type a, left type B large volume  Right threshold normal sloping to severe mixed hearing loss  Left threshold moderate to severe mixed hearing loss  SRT right 45 dB, left 55 dB  Word recognition right 96% at 75 dB, left 96% at 85 dB.  No significant nerve asymmetry    She wears hearing aids         Medications:     Current Outpatient Rx   Medication Sig Dispense Refill    [employer] test strip       canaliflozin (INVOKANA) tablet Take 75 mg by mouth every morning (before breakfast)      celecoxib (CELEBREX) 200 MG capsule Take 200 mg by mouth daily      hydroxychloroquine (PLAQUENIL) 200 MG tablet       leflunomide (ARAVA) 10 MG tablet       metFORMIN (GLUCOPHAGE) 1000 MG tablet Take 1 tablet by mouth 2 times daily (with meals).      modafinil (PROVIGIL) 100 MG tablet Take 100 mg by mouth daily      clindamycin (CLEOCIN T) 1 % external lotion APPLY LOTION TOPICALLY TO AFFECTED AREA ONCE DAILY (Patient  not taking: Reported on 1/8/2024)      EQ ALLERGY RELIEF 25 MG tablet TAKE 1 TABLET BY MOUTH EVERY 6 HOURS AS NEEDED FOR ITCHING OR ALLERGIES (Patient not taking: Reported on 1/8/2024)      fluconazole (DIFLUCAN) 150 MG tablet Take 1 tablet by mouth daily at 2 pm (Patient not taking: Reported on 1/8/2024)      HUMIRA *CF* PEN 40 MG/0.4ML pen kit INJECT 40 MG (0.4 ML) SUBCUTANEOUSLY EVERY WEEK (Patient not taking: Reported on 1/8/2024)      HYDROcodone-acetaminophen (NORCO)  MG per tablet Take 1 tablet by mouth every 6 hours (Patient not taking: Reported on 11/10/2020) 30 tablet 0    medroxyPROGESTERone (PROVERA) 10 MG tablet Take 10 mg by mouth daily (Patient not taking: Reported on 1/8/2024)      naproxen sodium 220 MG capsule Take 880 mg by mouth 2 times daily (with meals) (Patient not taking: Reported on 1/8/2024)      ondansetron (ZOFRAN) 4 MG tablet Take 1 tablet (4 mg) by mouth every 6 hours as needed for nausea (Patient not taking: Reported on 1/8/2024) 30 tablet 0    vitamin B-Complex Take 1 tablet by mouth daily (Patient not taking: Reported on 1/8/2024)              Allergies:     Allergies: Dulaglutide, Erythromycin, Glimepiride, Januvia [sitagliptin], Meloxicam, Methylphenidate, Oxycodone, Percocet [oxycodone-acetaminophen], Ritalin [methylphenidate], Tramadol, Tylenol with codeine [acetaminophen-codeine], Vistaril [hydroxyzine], Vortioxetine, Welchol [colesevelam], Strattera [atomoxetine], and Valium [diazepam]          Past Medical History:     Past Medical History:   Diagnosis Date    Bicuspid aortic valve     Bicuspid aortic valve     Bipolar disorder (H) 06/26/2019    Depression     DM type 2 (diabetes mellitus, type 2) (H)     Elevated LDL cholesterol level 06/26/2019    HLA B27 (HLA B27 positive) 06/26/2019    Hyperlipidemia     Hypersomnolence disorder 06/26/2019    Menorrhagia with irregular cycle 06/26/2019    Nondependent alcohol abuse, unspecified drinking b 01/02/2001    PONV  "(postoperative nausea and vomiting)     Spondylolisthesis of lumbar region 2019    Status post right hip replacement 2019            Past Surgical History:     Past Surgical History:   Procedure Laterality Date    ARTHROPLASTY HIP ANTERIOR Left 2019    Procedure: DIRECT ANTERIOR LEFT TOTAL HIP ARTHROPLASTY;  Surgeon: Cirilo Miranda MD;  Location: [facility] OR    BIOPSY Right 10/30/2013    right breast biopsy; fibroadenoma    BIOPSY BREAST Left     before / benign / clip inserted    CLOSED REDUCTION HIP Left 2019    Procedure: CLOSED REDUCTION LEFT HIP;  Surgeon: Cirilo Miranda MD;  Location: [facility] OR    ORTHOPEDIC SURGERY  2014    Right total hip Dr. Meño Sullivan     ORTHOPEDIC SURGERY Right 2015    Right hip psoas release    ORTHOPEDIC SURGERY Left 2015    left hip labrum debridement Dr. Meño Sullivan     TUBAL LIGATION         ENT family history reviewed         Social History:     Social History     Tobacco Use    Smoking status: Former     Packs/day: 1.00     Years: 25.00     Additional pack years: 0.00     Total pack years: 25.00     Types: Cigarettes     Quit date:      Years since quittin.0    Smokeless tobacco: Never   Substance Use Topics    Alcohol use: Yes     Comment: formerly: 2 beers weekly    Drug use: Not Currently            Review of Systems:     ROS: See HPI         Physical Exam:     BP (!) 151/84 (BP Location: Right arm, Patient Position: Sitting, Cuff Size: Adult Large)   Pulse 70   Temp 97.9  F (36.6  C) (Temporal)   Ht 1.626 m (5' 4.02\")   Wt 104.3 kg (229 lb 15 oz)   SpO2 97%   BMI 39.45 kg/m      General - The patient is well nourished and well developed, and appears to have good nutritional status.  Alert and oriented to person and place, answers questions and cooperates with examination appropriately.   Head and Face - Normocephalic and atraumatic, with no gross asymmetry noted.  The facial nerve is intact, with strong symmetric " movements.  Voice and Breathing - The patient was breathing comfortably without the use of accessory muscles. There was no wheezing, stridor. The patients voice was clear and strong, and had appropriate pitch and quality.  Ears - The ears were examined with binocular microscopy and with otoscope.  External ears normal. Right canal patent.  Left canal patent.   Right tympanic membrane is intact without effusion, retraction or mass. Left tympanic membrane has approximately 10% inferior perforation with epithelization.  The edges are otherwise dry.  The visualized middle ear space appears healthy.  No chaparro retraction or concerns for cholesteatoma.  Eyes - Extraocular movements intact, sclera were not icteric or injected, conjunctiva were pink and moist.  Mouth - Examination of the oral cavity showed pink, healthy oral mucosa. No lesions or ulcerations noted. The tongue was mobile and midline.   Throat - The walls of the oropharynx were smooth, pink, moist, symmetric, and had no lesions or ulcerations.  The tonsillar pillars and soft palate were symmetric. The uvula was midline on elevation.    Neck -normal range of motion, no chaparro palpable lymphadenopathy.  Palpation of the thyroid was soft and smooth, with no nodules or goiter appreciated.  The trachea was mobile and midline.  Nose - External contour is symmetric, no gross deflection or scars.  Nasal mucosa is pink and moist with no abnormal mucus.  The septum and turbinates were evaluated with nasal speculum, no polyps, masses, or purulence noted on examination.    She experienced slight vertigo with turning her head to the right in a supine position during ear exam.  She declines repositioning today, she would like to follow-up with physical therapy.         Assessment and Plan:       ICD-10-CM    1. Perforation of tympanic membrane, left  H72.92       2. Horizontal vertigo  H81.10 Physical Therapy Referral      3. Benign paroxysmal positional vertigo of right ear   H81.11 Physical Therapy Referral      4. Sensorineural hearing loss (SNHL) of both ears  H90.3 Adult Audiology  Referral      5. Mixed conductive and sensorineural hearing loss of both ears  H90.6       6. Hearing aid worn  Z97.4         Referral for physical therapy for right-sided rotary vertigo, I suspect this is a horizontal vertigo.  Follow-up for audiogram  Follow-up in 1 year for repeat ear exam  Continue to keep the left ear dry  Follow-up sooner with concerns or changes in symptoms    Arianna Barrera NP-C  [facility] ENT

## 2024-01-08 NOTE — PATIENT INSTRUCTIONS
Follow up for audiogram  Follow up in 1 year for repeat ear exam  Follow up sooner with concerns or changes      Thank you for allowing Arianna PATEL and our ENT team to participate in your care.  If your medications are too expensive, please call my nurse at the number listed below.  We can possibly change this medication.    If you have a scheduling or an appointment question please contact our Health Unit Coordinator at their direct line [phone] ext [phone]  ALL nursing questions or concerns can be directed to my Nurse Basia [phone].

## 2024-01-08 NOTE — LETTER
1/8/2024         RE: Elizabeth Rankin  [address]  [facility] [place] [address]        Dear Colleague,    Thank you for referring your patient, Elizabeth Rankin, to the [facility]. Please see a copy of my visit note below.    Otolaryngology Note         Chief Complaint:     Patient presents with:  Cerumen Impaction           History of Present Illness:     Elizabeth Rankin is a 58 year old female who presents today for ear cleaning.  She was last seen in ENT on 11/10/2020 by Radhika TORRES for routine follow-up.    She has a history of PE tube on left with subsequent perforation.  She reports occasional otorrhea.  She wakes with a wet ear when she sleeps on the left.  She keeps the left ear dry.    Right ear feels plugged all the time.  She is able to valsalva and open it.      She reports gradual change in hearing over time  No concerns for bothersome tinnitus  She has been experiencing rotary vertigo with laying flat and turning her head to the right.  Rotary vertigo is short-lived, less than 1 minute.  She has not previously tried any repositioning exercises.    No facial numbness or weakness.      No otalgia, or chaparro otorrhea, feels moisture in her ear in the morning..      Last audiogram completed 11/10/2020:  Tympanograms-right type a, left type B large volume  Right threshold normal sloping to severe mixed hearing loss  Left threshold moderate to severe mixed hearing loss  SRT right 45 dB, left 55 dB  Word recognition right 96% at 75 dB, left 96% at 85 dB.  No significant nerve asymmetry    She wears hearing aids         Medications:     Current Outpatient Rx   Medication Sig Dispense Refill     [employer] test strip        canaliflozin (INVOKANA) tablet Take 75 mg by mouth every morning (before breakfast)       celecoxib (CELEBREX) 200 MG capsule Take 200 mg by mouth daily       hydroxychloroquine (PLAQUENIL) 200 MG tablet        leflunomide (ARAVA) 10 MG tablet        metFORMIN  (GLUCOPHAGE) 1000 MG tablet Take 1 tablet by mouth 2 times daily (with meals).       modafinil (PROVIGIL) 100 MG tablet Take 100 mg by mouth daily       clindamycin (CLEOCIN T) 1 % external lotion APPLY LOTION TOPICALLY TO AFFECTED AREA ONCE DAILY (Patient not taking: Reported on 1/8/2024)       EQ ALLERGY RELIEF 25 MG tablet TAKE 1 TABLET BY MOUTH EVERY 6 HOURS AS NEEDED FOR ITCHING OR ALLERGIES (Patient not taking: Reported on 1/8/2024)       fluconazole (DIFLUCAN) 150 MG tablet Take 1 tablet by mouth daily at 2 pm (Patient not taking: Reported on 1/8/2024)       HUMIRA *CF* PEN 40 MG/0.4ML pen kit INJECT 40 MG (0.4 ML) SUBCUTANEOUSLY EVERY WEEK (Patient not taking: Reported on 1/8/2024)       HYDROcodone-acetaminophen (NORCO)  MG per tablet Take 1 tablet by mouth every 6 hours (Patient not taking: Reported on 11/10/2020) 30 tablet 0     medroxyPROGESTERone (PROVERA) 10 MG tablet Take 10 mg by mouth daily (Patient not taking: Reported on 1/8/2024)       naproxen sodium 220 MG capsule Take 880 mg by mouth 2 times daily (with meals) (Patient not taking: Reported on 1/8/2024)       ondansetron (ZOFRAN) 4 MG tablet Take 1 tablet (4 mg) by mouth every 6 hours as needed for nausea (Patient not taking: Reported on 1/8/2024) 30 tablet 0     vitamin B-Complex Take 1 tablet by mouth daily (Patient not taking: Reported on 1/8/2024)              Allergies:     Allergies: Dulaglutide, Erythromycin, Glimepiride, Januvia [sitagliptin], Meloxicam, Methylphenidate, Oxycodone, Percocet [oxycodone-acetaminophen], Ritalin [methylphenidate], Tramadol, Tylenol with codeine [acetaminophen-codeine], Vistaril [hydroxyzine], Vortioxetine, Welchol [colesevelam], Strattera [atomoxetine], and Valium [diazepam]          Past Medical History:     Past Medical History:   Diagnosis Date     Bicuspid aortic valve      Bicuspid aortic valve      Bipolar disorder (H) 06/26/2019     Depression      DM type 2 (diabetes mellitus, type 2) (H)       "Elevated LDL cholesterol level 2019     HLA B27 (HLA B27 positive) 2019     Hyperlipidemia      Hypersomnolence disorder 2019     Menorrhagia with irregular cycle 2019     Nondependent alcohol abuse, unspecified drinking b 2001     PONV (postoperative nausea and vomiting)      Spondylolisthesis of lumbar region 2019     Status post right hip replacement 2019            Past Surgical History:     Past Surgical History:   Procedure Laterality Date     ARTHROPLASTY HIP ANTERIOR Left 2019    Procedure: DIRECT ANTERIOR LEFT TOTAL HIP ARTHROPLASTY;  Surgeon: Cirilo Miranda MD;  Location: [facility]     BIOPSY Right 10/30/2013    right breast biopsy; fibroadenoma     BIOPSY BREAST Left     before / benign / clip inserted     CLOSED REDUCTION HIP Left 2019    Procedure: CLOSED REDUCTION LEFT HIP;  Surgeon: Cirilo Miranda MD;  Location: [facility]     ORTHOPEDIC SURGERY  2014    Right total hip Dr. Meño Sullivan      ORTHOPEDIC SURGERY Right 2015    Right hip psoas release     ORTHOPEDIC SURGERY Left 2015    left hip labrum debridement Dr. Meño Sullivan      TUBAL LIGATION         ENT family history reviewed         Social History:     Social History     Tobacco Use     Smoking status: Former     Packs/day: 1.00     Years: 25.00     Additional pack years: 0.00     Total pack years: 25.00     Types: Cigarettes     Quit date:      Years since quittin.0     Smokeless tobacco: Never   Substance Use Topics     Alcohol use: Yes     Comment: formerly: 2 beers weekly     Drug use: Not Currently            Review of Systems:     ROS: See HPI         Physical Exam:     BP (!) 151/84 (BP Location: Right arm, Patient Position: Sitting, Cuff Size: Adult Large)   Pulse 70   Temp 97.9  F (36.6  C) (Temporal)   Ht 1.626 m (5' 4.02\")   Wt 104.3 kg (229 lb 15 oz)   SpO2 97%   BMI 39.45 kg/m      General - The patient is well nourished and well developed, and " appears to have good nutritional status.  Alert and oriented to person and place, answers questions and cooperates with examination appropriately.   Head and Face - Normocephalic and atraumatic, with no gross asymmetry noted.  The facial nerve is intact, with strong symmetric movements.  Voice and Breathing - The patient was breathing comfortably without the use of accessory muscles. There was no wheezing, stridor. The patients voice was clear and strong, and had appropriate pitch and quality.  Ears - The ears were examined with binocular microscopy and with otoscope.  External ears normal. Right canal patent.  Left canal patent.   Right tympanic membrane is intact without effusion, retraction or mass. Left tympanic membrane has approximately 10% inferior perforation with epithelization.  The edges are otherwise dry.  The visualized middle ear space appears healthy.  No chaparro retraction or concerns for cholesteatoma.  Eyes - Extraocular movements intact, sclera were not icteric or injected, conjunctiva were pink and moist.  Mouth - Examination of the oral cavity showed pink, healthy oral mucosa. No lesions or ulcerations noted. The tongue was mobile and midline.   Throat - The walls of the oropharynx were smooth, pink, moist, symmetric, and had no lesions or ulcerations.  The tonsillar pillars and soft palate were symmetric. The uvula was midline on elevation.    Neck -normal range of motion, no chaparro palpable lymphadenopathy.  Palpation of the thyroid was soft and smooth, with no nodules or goiter appreciated.  The trachea was mobile and midline.  Nose - External contour is symmetric, no gross deflection or scars.  Nasal mucosa is pink and moist with no abnormal mucus.  The septum and turbinates were evaluated with nasal speculum, no polyps, masses, or purulence noted on examination.    She experienced slight vertigo with turning her head to the right in a supine position during ear exam.  She declines  repositioning today, she would like to follow-up with physical therapy.         Assessment and Plan:       ICD-10-CM    1. Perforation of tympanic membrane, left  H72.92       2. Horizontal vertigo  H81.10 Physical Therapy Referral      3. Benign paroxysmal positional vertigo of right ear  H81.11 Physical Therapy Referral      4. Sensorineural hearing loss (SNHL) of both ears  H90.3 Adult Audiology  Referral      5. Mixed conductive and sensorineural hearing loss of both ears  H90.6       6. Hearing aid worn  Z97.4         Referral for physical therapy for right-sided rotary vertigo, I suspect this is a horizontal vertigo.  Follow-up for audiogram  Follow-up in 1 year for repeat ear exam  Continue to keep the left ear dry  Follow-up sooner with concerns or changes in symptoms    Arianna PATEL  [facility] ENT      Again, thank you for allowing me to participate in the care of your patient.        Sincerely,        Arianna Barrera NP

## 2024-01-09 ENCOUNTER — TELEPHONE (OUTPATIENT)
Dept: INTERVENTIONAL RADIOLOGY/VASCULAR | Facility: HOSPITAL | Age: 59
End: 2024-01-09

## 2024-01-09 NOTE — TELEPHONE ENCOUNTER
INJECTION POST CALL    Procedure: Epidural  Radiologist(s): Dr. Lang Yoder  Date of Procedure:  12/27/23    Relief of pain from this injection    A = 90%  A- = 85%  B = 80%  B- =75%  C = 70%  C- = 65%  D = 60%  D- = 50%  F = less than 50%      Do you feel this injection was beneficial? Patient states she hasn't received any relief. She said that it takes the steroid longer for her, for it to actually start feeling better    If yes, how long did it last? Right now 0% of relief at the two week f/u    Instruct patient to follow up with their provider for any further care they may need. (as Dr. Jimenez will no longer be making recommendations nor addended the exam with recommmendations)    Alesia Virgen

## 2024-01-23 ENCOUNTER — THERAPY VISIT (OUTPATIENT)
Dept: PHYSICAL THERAPY | Facility: HOSPITAL | Age: 59
End: 2024-01-23
Attending: NURSE PRACTITIONER
Payer: MEDICARE

## 2024-01-23 DIAGNOSIS — H81.10: ICD-10-CM

## 2024-01-23 DIAGNOSIS — H81.11 BENIGN PAROXYSMAL POSITIONAL VERTIGO OF RIGHT EAR: ICD-10-CM

## 2024-01-23 PROCEDURE — 95992 CANALITH REPOSITIONING PROC: CPT | Mod: GP | Performed by: PHYSICAL THERAPIST

## 2024-01-23 PROCEDURE — 999N000104 HC STATISTIC NO CHARGE: Performed by: PHYSICAL THERAPIST

## 2024-01-23 PROCEDURE — 97161 PT EVAL LOW COMPLEX 20 MIN: CPT | Mod: GP | Performed by: PHYSICAL THERAPIST

## 2024-01-23 NOTE — PROGRESS NOTES
"PHYSICAL THERAPY EVALUATION  Type of Visit: Evaluation    See electronic medical record for Abuse and Falls Screening details.    Subjective       Presenting condition or subjective complaint: Vertigo  Date of onset: 01/08/24    Relevant medical history:     Dates & types of surgery: Hip Replacement - Both    Prior diagnostic imaging/testing results:       Prior therapy history for the same diagnosis, illness or injury: No      Prior Level of Function  Transfers: Independent  Ambulation: Independent  ADL: Independent  IADL:     Living Environment  Social support:   with someone  Type of home:   house  Stairs to enter the home:       none to enter  Ramp:     Stairs inside the home:         Help at home:    Equipment owned:       Employment:    on disability, works a couple days per month  Hobbies/Interests:      Patient goals for therapy: Lay flat    Pain assessment:  reports arthritic pain     Objective      Cognitive Status Examination  Orientation: Oriented to person, place and time   Level of Consciousness: Alert  Follows Commands and Answers Questions: 100% of the time  Personal Safety and Judgement: Intact  Memory: Intact    OBSERVATION: no acute distress  INTEGUMENTARY:   POSTURE:   PALPATION: NT  RANGE OF MOTION: LE ROM WFL  STRENGTH: LE Strength WFL    BED MOBILITY: Independent    TRANSFERS: Independent    WHEELCHAIR MOBILITY:     GAIT:   Level of [place]: Independent  Assistive Device(s): None  Gait Deviations:  wide KATIUSKA  Gait Distance: 100' within department  Stairs: NT    BALANCE:  not formally assessed this session due to time constraints        SENSATION:     REFLEXES:   MUSCLE TONE:        VESTIBULAR EVALUATION  ADDITIONAL HISTORY:  Pt reports years and years of dizziness.  Pt states she had a period of time where she was having \"spells\" that were causing her to have stroke like symptoms and saw a PT for work on her neck which resolved the stroke like symptoms.   Pt states her dizziness symptoms " have been when she lies down and turns her head to the right she gets dizzy or if she bends forward she feels like she wants to go head over heals.  Pt states if she goes to the doctor or dentist she has to stay propped up.  Pt reports she sleeps with her head elevated.  Pt states it has been going on for so many years that she has just learned to adjust her life.  Pt also reports she has had intermittent episodes of dizziness that will occur where she is in bed with room spinning for up to a day.  Or she has episodes when she is in a car and if she stops she feels like she is still moving.   Pt reports history of motion sickness, states she can get motion sick watching motion on screens, moving, etc.    Description of symptoms: I feel like I am spinning; Light-headedness  Dizzy attacks:   Start:     Last attack:     Frequency of occurrences:     Length of attack:    Difficulty hearing: Both ears  Noise in ears? No    Alleviates symptoms:    Worsens symptoms:    Activities that bring on symptoms: Bending over; Other  Laying down    Pertinent visual history: none  Pertinent history of current vestibular problem: Anxiety, Depression, Hearing loss, Motion sickness, Prior concussion(s)  DHI: Total Score: 4    Cervicogenic Screen    Neck ROM WFL for testing   Vertebral Artery Test    Alar Ligament Test    Transverse Ligament Test    Distraction    Neck Torsion Test (head still, body rotating)    Neck Torsion Test (head and body rotating)         Oculomotor Screen    Ocular ROM    Smooth Pursuit Normal   Saccades Normal   VOR Normal   VOR Cancellation Normal   Head Impulse Test Normal   Convergence Testing         Infrared Goggle Exam Vestibular Suppressant in Last 24 Hours? No  Exam Completed With: Infrared goggles   Spontaneous Nystagmus Negative   Gaze Evoked Nystagmus Negative   Head Shake Horizontal Nystagmus Negative   Positional Testing    Supine Head-Hanging Test     Left Right   [place]-Hallpike Upbeating R torsional  Negative   Sidelying Test     [facility] Roll [facility] Negative Negative   [facility] Forward Roll Test     [place] and Lean Test -  Sitting Erect    [place] and Lean Test - Seated, Head Bent 60 Degrees Forward    [place] and Lean Test - Seated, Head Bent Backwards       BPPV Canal(s): R Posterior  BPPV Type: Canalithasis    Dynamic Visual Acuity (DVA)    Static Acuity (LogMar)    Horizontal Head Movement at 1 Hz (LogMar)    Horizontal Head Movement at 2 Hz (LogMar)           Assessment & Plan   CLINICAL IMPRESSIONS  Medical Diagnosis: horizontal vertigo, BPPV - right    Treatment Diagnosis: dizziness with bed mobility   Impression/Assessment: Patient is a 58 year old female with dizziness complaints.  The following significant findings have been identified: Instability, Dizziness, and Disequilibrium . These impairments interfere with their ability to perform self care tasks, work tasks, recreational activities, household chores, driving , and household mobility as compared to previous level of function.     Clinical Decision Making (Complexity):  Clinical Presentation: Stable/Uncomplicated  Clinical Presentation Rationale: based on medical and personal factors listed in PT evaluation  Clinical Decision Making (Complexity): Low complexity    PLAN OF CARE  Treatment Interventions:  Interventions: Gait Training, Neuromuscular Re-education, Therapeutic Activity, Therapeutic Exercise, Canalith Repositioning    Long Term Goals     PT Goal 1  Goal Identifier: LTG 1  Goal Description: Pt to tolerate all bed mobility without episodes of dizziness.  Goal Progress: new  Target Date: 04/02/24  PT Goal 2  Goal Identifier: LTG 2  Goal Description: Pt to report at least 75% improvement in overall symptoms to tolerate her day to day activities.  Goal Progress: new  Target Date: 04/02/24      Frequency of Treatment: 1x/week  Duration of Treatment: 10 weeks    Recommended Referrals to Other Professionals:   Education Assessment:   Learner/Method:  Patient;Listening;No Barriers to Learning    Risks and benefits of evaluation/treatment have been explained.   Patient/Family/caregiver agrees with Plan of Care.     Evaluation Time:     PT Eval, Low Complexity Minutes (21179): 24       Signing Clinician: FRANSICO [facility]                                                                                   OUTPATIENT PHYSICAL THERAPY      PLAN OF TREATMENT FOR OUTPATIENT REHABILITATION   Patient's Last Name, First Name, Elizabeth Alonso YOB: 1965   Provider's Name   [facility]   Medical Record No.  [id number]     Onset Date: 01/08/24  Start of Care Date: 01/23/24     Medical Diagnosis:  horizontal vertigo, BPPV - right      PT Treatment Diagnosis:  dizziness with bed mobility Plan of Treatment  Frequency/Duration: 1x/week/ 10 weeks    Certification date from 01/23/24 to 04/02/24         See note for plan of treatment details and functional goals     Berta Mathur PT                         I CERTIFY THE NEED FOR THESE SERVICES FURNISHED UNDER        THIS PLAN OF TREATMENT AND WHILE UNDER MY CARE .             Physician Signature               Date    X_____________________________________________________                  Referring Provider:  Arianna Barrera    Initial Assessment  See Epic Evaluation- Start of Care Date: 01/23/24

## 2024-01-30 ENCOUNTER — THERAPY VISIT (OUTPATIENT)
Dept: PHYSICAL THERAPY | Facility: HOSPITAL | Age: 59
End: 2024-01-30
Attending: FAMILY MEDICINE
Payer: MEDICARE

## 2024-01-30 DIAGNOSIS — H81.11 BENIGN PAROXYSMAL POSITIONAL VERTIGO OF RIGHT EAR: Primary | ICD-10-CM

## 2024-01-30 DIAGNOSIS — H81.10: ICD-10-CM

## 2024-01-30 PROCEDURE — 97750 PHYSICAL PERFORMANCE TEST: CPT | Mod: GP | Performed by: PHYSICAL THERAPIST

## 2024-01-30 NOTE — PROGRESS NOTES
"   01/30/24 0500   Appointment Info   Signing clinician's name / credentials Berta DosskingKRISTIE   Total/Authorized Visits 365   Visits Used 2 medicare   Medical Diagnosis horizontal vertigo, BPPV - right   PT Tx Diagnosis dizziness with bed mobility   Progress Note/Certification   Start of Care Date 01/23/24   Onset of illness/injury or Date of Surgery 01/08/24   Therapy Frequency 1x/week   Predicted Duration 10 weeks   Certification date from 01/23/24   Certification date to 04/02/24   Progress Note Completed Date 01/23/24   GOALS   PT Goals 2   PT Goal 1   Goal Identifier LTG 1   Goal Description Pt to tolerate all bed mobility without episodes of dizziness.   Goal Progress met   Target Date 04/02/24   PT Goal 2   Goal Identifier LTG 2   Goal Description Pt to report at least 75% improvement in overall symptoms to tolerate her day to day activities.   Goal Progress met   Target Date 04/02/24   Subjective Report   Subjective Report Pt reports she had a headache for the rest of the day after last treatment.  Pt states she has not been able make herself spin anymore but states if she turns her head she feels \"different\" but not spinning.   Eval/Assessments   Assessments Physical Performance Test/Measures   Physical Performance Test/measures   Physical Performance Test/Measurement, Minutes (18963) 15   Skilled Intervention Repeat positional testing completed, pt negative for nystagmus or room spinning with bilateral michaelle-hallpike.  Pt reports \"feeling off\" in right michaelle hallpike but again reports it's not spinning.  DId advise pt work work on repeated sidelying and repeated rolling in bed for habituation with decreased number of pillows.  If symptoms persist, follow back up.   Plan   Home program repeated sidelying bilaterally, repeated rolling   Plan for next session DC PT, follow up if symptoms return/persist   Total Session Time   Timed Code Treatment Minutes 15   Total Treatment Time (sum of timed and untimed " services) 15

## 2024-04-02 ENCOUNTER — OFFICE VISIT (OUTPATIENT)
Dept: AUDIOLOGY | Facility: OTHER | Age: 59
End: 2024-04-02
Attending: NURSE PRACTITIONER
Payer: MEDICARE

## 2024-04-02 DIAGNOSIS — H90.6 MIXED CONDUCTIVE AND SENSORINEURAL HEARING LOSS OF BOTH EARS: Primary | ICD-10-CM

## 2024-04-02 DIAGNOSIS — H90.3 SENSORINEURAL HEARING LOSS (SNHL) OF BOTH EARS: ICD-10-CM

## 2024-04-02 PROCEDURE — 92557 COMPREHENSIVE HEARING TEST: CPT | Performed by: AUDIOLOGIST

## 2024-04-02 PROCEDURE — 92567 TYMPANOMETRY: CPT | Performed by: AUDIOLOGIST

## 2024-04-02 NOTE — PROGRESS NOTES
Audiology Evaluation Completed. Please refer SCANNED AUDIOGRAM and/or TYMPANOGRAM for BACKGROUND, RESULTS, RECOMMENDATIONS.      Ursula DANIELS, [facility]  Audiologist #[id number]

## 2024-04-03 ENCOUNTER — MEDICAL CORRESPONDENCE (OUTPATIENT)
Dept: INTERVENTIONAL RADIOLOGY/VASCULAR | Facility: HOSPITAL | Age: 59
End: 2024-04-03

## 2024-04-08 ENCOUNTER — MEDICAL CORRESPONDENCE (OUTPATIENT)
Dept: HEALTH INFORMATION MANAGEMENT | Facility: HOSPITAL | Age: 59
End: 2024-04-08

## 2024-04-16 ENCOUNTER — TELEPHONE (OUTPATIENT)
Dept: INTERVENTIONAL RADIOLOGY/VASCULAR | Facility: HOSPITAL | Age: 59
End: 2024-04-16

## 2024-04-16 NOTE — TELEPHONE ENCOUNTER
Called Patient to change injection for May 17th or later due to taking antibiotics  on may 2nd. Transferred her to schedulers. Patient ok with this

## 2024-05-29 ENCOUNTER — TELEPHONE (OUTPATIENT)
Dept: INTERVENTIONAL RADIOLOGY/VASCULAR | Facility: HOSPITAL | Age: 59
End: 2024-05-29

## 2024-05-29 NOTE — TELEPHONE ENCOUNTER
Called patient to remind them of their appt on 6/3. Also reminded patient to not take any antibiotics, steroids, or immunizations two weeks before and after this appt.  And they also need a .

## 2024-06-03 ENCOUNTER — HOSPITAL ENCOUNTER (OUTPATIENT)
Facility: HOSPITAL | Age: 59
Discharge: HOME | End: 2024-06-03
Attending: RADIOLOGY | Admitting: RADIOLOGY
Payer: MEDICARE

## 2024-06-03 ENCOUNTER — HOSPITAL ENCOUNTER (OUTPATIENT)
Dept: GENERAL RADIOLOGY | Facility: HOSPITAL | Age: 59
Discharge: HOME | End: 2024-06-03
Attending: FAMILY MEDICINE
Payer: MEDICARE

## 2024-06-03 DIAGNOSIS — M54.16 LUMBAR RADICULOPATHY: ICD-10-CM

## 2024-06-03 DIAGNOSIS — M47.816 LUMBAR SPONDYLOSIS: ICD-10-CM

## 2024-06-03 DIAGNOSIS — M45.9 ANKYLOSING SPONDYLITIS (H): ICD-10-CM

## 2024-06-03 PROCEDURE — 250N000011 HC RX IP 250 OP 636: Performed by: RADIOLOGY

## 2024-06-03 PROCEDURE — 62323 NJX INTERLAMINAR LMBR/SAC: CPT

## 2024-06-03 RX ORDER — DEXAMETHASONE SODIUM PHOSPHATE 10 MG/ML
10 INJECTION, SOLUTION INTRAMUSCULAR; INTRAVENOUS ONCE
Status: COMPLETED | OUTPATIENT
Start: 2024-06-03 | End: 2024-06-03

## 2024-06-03 RX ORDER — IOPAMIDOL 612 MG/ML
15 INJECTION, SOLUTION INTRATHECAL ONCE
Status: COMPLETED | OUTPATIENT
Start: 2024-06-03 | End: 2024-06-03

## 2024-06-03 RX ADMIN — DEXAMETHASONE SODIUM PHOSPHATE 10 MG: 10 INJECTION, SOLUTION INTRAMUSCULAR; INTRAVENOUS at 13:58

## 2024-06-03 RX ADMIN — IOPAMIDOL 3 ML: 612 INJECTION, SOLUTION INTRATHECAL at 13:58

## 2024-06-03 ASSESSMENT — ACTIVITIES OF DAILY LIVING (ADL)
ADLS_ACUITY_SCORE: 38

## 2024-06-03 NOTE — DISCHARGE INSTRUCTIONS
Home number on file [phone] (home)  Is it ok to leave a message at this number(s)? Yes    DR. JUARES completed your procedure on 6/3/2024.    Current Pain Level (0-10 Scale): 4/10  Post Pain Level (0-10):  3/10    Radiology Discharge instructions for Steroid Injection    Activity Level:     Do not do any heavy activity or exercise for 24 hours.   Do not drive for 4 hours after your injection.  Diet:   Return to your normal diet.  Medications:   If you have stopped taking your Aspirin, Coumadin/Warfarin, Ibuprofen, or any   other blood thinner for this procedure you may resume in the morning unless   your primary care provider has given you other instructions.    Diabetics may see an increase in blood sugar after steroid injections. If you are concerned about your blood sugar, please contact your family doctor.    Site Care:  Remove the bandage and bathe or shower the morning after the procedure.      Please allow two weeks to experience improvement in your pain.  If you have any further issues, please contact your provider.    Call your Primary Care Provider if you have the following (if your primary care provider is not available please seek emergency care):   Nausea with vomiting   Severe headache   Drowsiness or confusion   Redness or drainage at the injection or puncture site   Temperature over 101 degrees F   Other concerns   Worsening back pain   Stiff neck

## 2024-06-04 ENCOUNTER — TELEPHONE (OUTPATIENT)
Dept: INTERVENTIONAL RADIOLOGY/VASCULAR | Facility: HOSPITAL | Age: 59
End: 2024-06-04

## 2024-06-04 ASSESSMENT — ACTIVITIES OF DAILY LIVING (ADL)
ADLS_ACUITY_SCORE: 38

## 2024-06-04 NOTE — TELEPHONE ENCOUNTER
Patient is feeling better today. Her headache pain yesterday was a 4 of 10, 10 being the worst. She is drinking caffeine, fluids, and resting. Today the headache is a 2 of 10 , 10 being the worst. She continues to rest, drink caffeine , and fluids

## 2024-06-04 NOTE — PROGRESS NOTES
Patient had an immediate headache after injection. Explained to go home to lay down, drink caffeine and other fluids . If headache does not get better after doing these things please come in or call

## 2024-06-05 ENCOUNTER — TELEPHONE (OUTPATIENT)
Dept: INTERVENTIONAL RADIOLOGY/VASCULAR | Facility: HOSPITAL | Age: 59
End: 2024-06-05

## 2024-06-05 NOTE — TELEPHONE ENCOUNTER
Patient called with a headache. She can't even  shower to wash up. Notified Radiologist. Called patient back - keep drinking caffeine, lots of fluids, and  lay flat. Only up to the bathroom. Also it will possibly take up to 2 weeks  per radiologist. Patient understood . Told her to give us a call when she is feeling better. If any problems she can also go to the ER after hours.

## 2024-06-17 ENCOUNTER — TELEPHONE (OUTPATIENT)
Dept: INTERVENTIONAL RADIOLOGY/VASCULAR | Facility: HOSPITAL | Age: 59
End: 2024-06-17

## 2024-06-17 NOTE — TELEPHONE ENCOUNTER
INJECTION POST CALL    Procedure: Epidural TL L5-S1  Radiologist(s): DR. AZUL JUARES  Date of Procedure:  6/3/24    Relief of pain from this injection    A = 90%  A- = 85%  B = 80%  B- =75%  C = 70%  C- = 65%  D = 60%  D- = 50%  F = less than 50%      Do you feel this injection was beneficial? Yes patient is feeling about 50% of relief.    If yes, how long did it last? It took awhile but patient states she is feeling 50%    Instruct patient to follow up with their provider for any further care they may need. (as Dr. Jimenez will no longer be making recommendations nor addended the exam with recommmendations)    Alesia Virgen

## 2025-04-08 ENCOUNTER — OFFICE VISIT (OUTPATIENT)
Dept: AUDIOLOGY | Facility: OTHER | Age: 60
End: 2025-04-08
Attending: AUDIOLOGIST
Payer: MEDICARE

## 2025-04-08 DIAGNOSIS — H90.3 SENSORINEURAL HEARING LOSS (SNHL) OF BOTH EARS: Primary | ICD-10-CM

## 2025-04-08 NOTE — PROGRESS NOTES
HEARING AID CHECK    BACKGROUND INFORMATON:  Elizabeth Rankin was seen in the Audiology Clinic at the [facility] on April 8, 2025 for a hearing aid check.  The hearing aid (s) are out of warranty with original warranty expiration: 12/19/2023.   Ms. Rankin wears Binaural Unitron DX Moxi Move R 7.  Ms. Rankin reported aids and hearing aid  not working.      PROCEDURES AND FINDINGS:      Visual inspection and listening check provided. Aids and hearing aid  no function.    Warranty reviewed with patient. Repair options reviewed.    Estimate of insurance coverage reviewed. Patient was counseled that exact out of pocket amounts cannot be determined for hearing aid claims being sent to insurance. Any insurance coverage information presented to the patient is an estimate only, and is not a guarantee of payment. Patient has been advised to check with their own insurance.      Reviewed use, care and cleaning of hearing aids with patient.  Any cleaning tools used today were provided as customer courtesy.      SUMMARY AND RECOMMENDATIONS:  Ms. Rankin hearing aids and  were sent out for repair out of warranty. Patient will be notified when aids and  are back from repair.      Supervising audiologist has reviewed today's service and is in agreement with the assistant's assessment and plan as documented.    Patient will return to clinic once hearing aid(s) are repaired. Patient had no further questions and reports satisfaction.    Nereida Lofton  Audiology Assistant  [facility]  [phone]      I have reviewed and agree with the information in this note.    Aids will be sent in for Out of warranty repair and for vendor to troubleshoot/repair/replace . [facility] CONTRACT GUIDELINES      Ursula Mcdowell M.S., CCC-A  Audiologist

## 2025-04-15 ENCOUNTER — ALLIED HEALTH/NURSE VISIT (OUTPATIENT)
Dept: AUDIOLOGY | Facility: OTHER | Age: 60
End: 2025-04-15
Attending: PHYSICIAN ASSISTANT
Payer: MEDICARE

## 2025-04-15 DIAGNOSIS — H91.93 DECREASED HEARING OF BOTH EARS: Primary | ICD-10-CM

## 2025-04-15 NOTE — PROGRESS NOTES
Background:  Received repaired Binaural Unitron DX Moxi Move R 7.       Procedures Performed:  The  completed the following repairs: Replaced housing,  and hearing aid . Reprogrammed to user settings.    Follow up:   Ms. Rankin was called for hearing aid  at .  Return to clinic as needed.      Nereida Lofton  Audiology Assistant  [facility]  [phone]

## 2025-07-09 ENCOUNTER — MEDICAL CORRESPONDENCE (OUTPATIENT)
Dept: MRI IMAGING | Facility: HOSPITAL | Age: 60
End: 2025-07-09

## 2025-07-25 ENCOUNTER — HOSPITAL ENCOUNTER (OUTPATIENT)
Dept: MRI IMAGING | Facility: HOSPITAL | Age: 60
Discharge: HOME | End: 2025-07-25
Attending: FAMILY MEDICINE | Admitting: RADIOLOGY
Payer: MEDICARE

## 2025-07-25 DIAGNOSIS — M47.819 ARTHROPATHY OF SPINAL FACET JOINT CONCURRENT WITH AND DUE TO EFFUSION: ICD-10-CM

## 2025-07-25 DIAGNOSIS — M25.40 ARTHROPATHY OF SPINAL FACET JOINT CONCURRENT WITH AND DUE TO EFFUSION: ICD-10-CM

## 2025-07-25 PROCEDURE — 72148 MRI LUMBAR SPINE W/O DYE: CPT | Mod: 26 | Performed by: RADIOLOGY

## 2025-07-25 PROCEDURE — 72141 MRI NECK SPINE W/O DYE: CPT | Mod: 26 | Performed by: RADIOLOGY

## 2025-07-25 PROCEDURE — 72148 MRI LUMBAR SPINE W/O DYE: CPT

## 2025-07-25 PROCEDURE — 72141 MRI NECK SPINE W/O DYE: CPT

## 2025-07-28 ENCOUNTER — HOSPITAL ENCOUNTER (OUTPATIENT)
Dept: INTERVENTIONAL RADIOLOGY/VASCULAR | Facility: HOSPITAL | Age: 60
Discharge: HOME | End: 2025-07-28
Attending: FAMILY MEDICINE
Payer: MEDICARE

## 2025-07-28 ENCOUNTER — HOSPITAL ENCOUNTER (OUTPATIENT)
Facility: HOSPITAL | Age: 60
Discharge: HOME | End: 2025-07-28
Attending: RADIOLOGY | Admitting: RADIOLOGY
Payer: MEDICARE

## 2025-07-28 DIAGNOSIS — M47.26 OTHER SPONDYLOSIS WITH RADICULOPATHY, LUMBAR REGION: ICD-10-CM

## 2025-07-28 DIAGNOSIS — M45.9 ANKYLOSING SPONDYLITIS (H): ICD-10-CM

## 2025-07-28 PROCEDURE — 272N000472 XR LUMBAR TRANSLAMINAR INJ INCL IMAGING

## 2025-07-28 PROCEDURE — 250N000011 HC RX IP 250 OP 636: Performed by: RADIOLOGY

## 2025-07-28 PROCEDURE — 62323 NJX INTERLAMINAR LMBR/SAC: CPT | Performed by: RADIOLOGY

## 2025-07-28 RX ORDER — DEXAMETHASONE SODIUM PHOSPHATE 10 MG/ML
10 INJECTION, SOLUTION INTRAMUSCULAR; INTRAVENOUS ONCE
Status: COMPLETED | OUTPATIENT
Start: 2025-07-28 | End: 2025-07-28

## 2025-07-28 RX ORDER — IOPAMIDOL 612 MG/ML
15 INJECTION, SOLUTION INTRATHECAL ONCE
Status: COMPLETED | OUTPATIENT
Start: 2025-07-28 | End: 2025-07-28

## 2025-07-28 RX ADMIN — IOPAMIDOL 2 ML: 612 INJECTION, SOLUTION INTRATHECAL at 14:55

## 2025-07-28 RX ADMIN — DEXAMETHASONE SODIUM PHOSPHATE 10 MG: 10 INJECTION, SOLUTION INTRA-ARTICULAR; INTRALESIONAL; INTRAMUSCULAR; INTRAVENOUS; SOFT TISSUE at 14:55

## 2025-07-28 ASSESSMENT — ACTIVITIES OF DAILY LIVING (ADL)
ADLS_ACUITY_SCORE: 50

## 2025-07-28 NOTE — DISCHARGE INSTRUCTIONS
Home number on file [phone] (home)  Is it ok to leave a message at this number(s)? Yes    Dr. Jimenez completed your procedure on 7/28/2025.    Current Pain Level (0-10 Scale): 3/10  Post Pain Level (0-10):  2/10    Radiology Discharge instructions for Steroid Injection    Activity Level:     Do not do any heavy activity or exercise for 24 hours.   Do not drive for 4 hours after your injection.  Diet:   Return to your normal diet.  Medications:   If you have stopped taking your Aspirin, Coumadin/Warfarin, Ibuprofen, or any   other blood thinner for this procedure you may resume in the morning unless   your primary care provider has given you other instructions.    Diabetics may see an increase in blood sugar after steroid injections. If you are concerned about your blood sugar, please contact your family doctor.    Site Care:  Remove the bandage and bathe or shower the morning after the procedure.      Please allow two weeks to experience improvement in your pain.  If you have any further issues, please contact your provider.    Call your Primary Care Provider if you have the following (if your primary care provider is not available please seek emergency care):   Nausea with vomiting   Severe headache   Drowsiness or confusion   Redness or drainage at the injection or puncture site   Temperature over 101 degrees F   Other concerns   Worsening back pain   Stiff neck

## 2025-07-29 ASSESSMENT — ACTIVITIES OF DAILY LIVING (ADL)
ADLS_ACUITY_SCORE: 50

## 2025-07-31 ENCOUNTER — MEDICAL CORRESPONDENCE (OUTPATIENT)
Dept: INTERVENTIONAL RADIOLOGY/VASCULAR | Facility: HOSPITAL | Age: 60
End: 2025-07-31

## 2025-08-04 ENCOUNTER — MEDICAL CORRESPONDENCE (OUTPATIENT)
Dept: INTERVENTIONAL RADIOLOGY/VASCULAR | Facility: HOSPITAL | Age: 60
End: 2025-08-04

## 2025-08-11 ENCOUNTER — MEDICAL CORRESPONDENCE (OUTPATIENT)
Dept: HEALTH INFORMATION MANAGEMENT | Facility: HOSPITAL | Age: 60
End: 2025-08-11

## 2025-08-11 ENCOUNTER — TELEPHONE (OUTPATIENT)
Dept: INTERVENTIONAL RADIOLOGY/VASCULAR | Facility: HOSPITAL | Age: 60
End: 2025-08-11

## 2025-08-14 ENCOUNTER — HOSPITAL ENCOUNTER (OUTPATIENT)
Dept: INTERVENTIONAL RADIOLOGY/VASCULAR | Facility: HOSPITAL | Age: 60
End: 2025-08-14
Attending: FAMILY MEDICINE
Payer: MEDICARE

## 2025-08-14 ENCOUNTER — HOSPITAL ENCOUNTER (OUTPATIENT)
Facility: HOSPITAL | Age: 60
Discharge: HOME | End: 2025-08-14
Attending: RADIOLOGY | Admitting: RADIOLOGY
Payer: MEDICARE

## 2025-08-14 DIAGNOSIS — M47.22 OTHER SPONDYLOSIS WITH RADICULOPATHY, CERVICAL REGION: ICD-10-CM

## 2025-08-14 DIAGNOSIS — M47.12 CERVICAL SPONDYLOSIS WITH MYELOPATHY: ICD-10-CM

## 2025-08-14 PROCEDURE — 272N000472 XR CERVICAL/THORACIC EPIDURAL INJ INCL IMAGING

## 2025-08-14 PROCEDURE — 250N000011 HC RX IP 250 OP 636: Performed by: RADIOLOGY

## 2025-08-14 RX ORDER — IOPAMIDOL 612 MG/ML
15 INJECTION, SOLUTION INTRATHECAL ONCE
Status: COMPLETED | OUTPATIENT
Start: 2025-08-14 | End: 2025-08-14

## 2025-08-14 RX ORDER — DEXAMETHASONE SODIUM PHOSPHATE 10 MG/ML
10 INJECTION, SOLUTION INTRAMUSCULAR; INTRAVENOUS ONCE
Status: COMPLETED | OUTPATIENT
Start: 2025-08-14 | End: 2025-08-14

## 2025-08-14 RX ADMIN — IOPAMIDOL 4 ML: 612 INJECTION, SOLUTION INTRATHECAL at 11:50

## 2025-08-14 RX ADMIN — DEXAMETHASONE SODIUM PHOSPHATE 10 MG: 10 INJECTION, SOLUTION INTRAMUSCULAR; INTRAVENOUS at 11:51

## 2025-08-27 ENCOUNTER — TELEPHONE (OUTPATIENT)
Dept: INTERVENTIONAL RADIOLOGY/VASCULAR | Facility: HOSPITAL | Age: 60
End: 2025-08-27

## 2025-08-31 ENCOUNTER — APPOINTMENT (OUTPATIENT)
Dept: GENERAL RADIOLOGY | Facility: HOSPITAL | Age: 60
End: 2025-08-31
Attending: NURSE PRACTITIONER
Payer: MEDICARE

## 2025-08-31 ENCOUNTER — HOSPITAL ENCOUNTER (EMERGENCY)
Facility: HOSPITAL | Age: 60
Discharge: OTHER INSTITUTION | End: 2025-08-31
Attending: EMERGENCY MEDICINE
Payer: MEDICARE

## 2025-08-31 ENCOUNTER — APPOINTMENT (OUTPATIENT)
Dept: GENERAL RADIOLOGY | Facility: HOSPITAL | Age: 60
End: 2025-08-31
Attending: EMERGENCY MEDICINE
Payer: MEDICARE

## 2025-08-31 VITALS
BODY MASS INDEX: 44.14 KG/M2 | WEIGHT: 257.28 LBS | HEART RATE: 78 BPM | TEMPERATURE: 98 F | DIASTOLIC BLOOD PRESSURE: 87 MMHG | SYSTOLIC BLOOD PRESSURE: 116 MMHG | OXYGEN SATURATION: 95 % | RESPIRATION RATE: 12 BRPM

## 2025-08-31 DIAGNOSIS — T84.020A DISLOCATION OF INTERNAL RIGHT HIP PROSTHESIS, INITIAL ENCOUNTER: Primary | ICD-10-CM

## 2025-08-31 LAB
HOLD SPECIMEN: NORMAL

## 2025-08-31 PROCEDURE — 73502 X-RAY EXAM HIP UNI 2-3 VIEWS: CPT | Mod: 26 | Performed by: RADIOLOGY

## 2025-08-31 PROCEDURE — 73502 X-RAY EXAM HIP UNI 2-3 VIEWS: CPT

## 2025-08-31 PROCEDURE — 99291 CRITICAL CARE FIRST HOUR: CPT | Mod: 25 | Performed by: EMERGENCY MEDICINE

## 2025-08-31 PROCEDURE — 999N000065 XR HIP RIGHT 2-3 VIEWS

## 2025-08-31 PROCEDURE — 250N000011 HC RX IP 250 OP 636: Performed by: EMERGENCY MEDICINE

## 2025-08-31 PROCEDURE — 999N000157 HC STATISTIC RCP TIME EA 10 MIN

## 2025-08-31 RX ORDER — PROPOFOL 10 MG/ML
INJECTION, EMULSION INTRAVENOUS
Status: COMPLETED
Start: 2025-08-31 | End: 2025-08-31

## 2025-08-31 RX ORDER — ONDANSETRON 2 MG/ML
INJECTION INTRAMUSCULAR; INTRAVENOUS
Status: COMPLETED
Start: 2025-08-31 | End: 2025-08-31

## 2025-08-31 RX ORDER — PROPOFOL 10 MG/ML
200 INJECTION, EMULSION INTRAVENOUS CONTINUOUS
Status: DISCONTINUED | OUTPATIENT
Start: 2025-08-31 | End: 2025-08-31 | Stop reason: HOSPADM

## 2025-08-31 RX ORDER — PROPOFOL 10 MG/ML
0-200 INJECTION, EMULSION INTRAVENOUS ONCE
Status: COMPLETED | OUTPATIENT
Start: 2025-08-31 | End: 2025-08-31

## 2025-08-31 RX ORDER — HYDROMORPHONE HYDROCHLORIDE 1 MG/ML
0.5 INJECTION, SOLUTION INTRAMUSCULAR; INTRAVENOUS; SUBCUTANEOUS ONCE
Refills: 0 | Status: COMPLETED | OUTPATIENT
Start: 2025-08-31 | End: 2025-08-31

## 2025-08-31 RX ORDER — ONDANSETRON 2 MG/ML
4 INJECTION INTRAMUSCULAR; INTRAVENOUS ONCE
Status: COMPLETED | OUTPATIENT
Start: 2025-08-31 | End: 2025-08-31

## 2025-08-31 RX ADMIN — HYDROMORPHONE HYDROCHLORIDE 0.5 MG: 1 INJECTION, SOLUTION INTRAMUSCULAR; INTRAVENOUS; SUBCUTANEOUS at 10:57

## 2025-08-31 RX ADMIN — ONDANSETRON 4 MG: 2 INJECTION INTRAMUSCULAR; INTRAVENOUS at 11:08

## 2025-08-31 RX ADMIN — HYDROMORPHONE HYDROCHLORIDE 1 MG: 1 INJECTION, SOLUTION INTRAMUSCULAR; INTRAVENOUS; SUBCUTANEOUS at 10:33

## 2025-08-31 RX ADMIN — HYDROMORPHONE HYDROCHLORIDE 0.5 MG: 1 INJECTION, SOLUTION INTRAMUSCULAR; INTRAVENOUS; SUBCUTANEOUS at 11:56

## 2025-08-31 RX ADMIN — PROPOFOL 40 MG: 10 INJECTION, EMULSION INTRAVENOUS at 11:11

## 2025-08-31 RX ADMIN — HYDROMORPHONE HYDROCHLORIDE 0.5 MG: 1 INJECTION, SOLUTION INTRAMUSCULAR; INTRAVENOUS; SUBCUTANEOUS at 13:45

## 2025-08-31 RX ADMIN — PROPOFOL 200 MG: 10 INJECTION, EMULSION INTRAVENOUS at 11:25

## 2025-08-31 RX ADMIN — PROPOFOL 160 MG: 10 INJECTION, EMULSION INTRAVENOUS at 11:24

## 2025-08-31 ASSESSMENT — COLUMBIA-SUICIDE SEVERITY RATING SCALE - C-SSRS
2. HAVE YOU ACTUALLY HAD ANY THOUGHTS OF KILLING YOURSELF IN THE PAST MONTH?: NO
6. HAVE YOU EVER DONE ANYTHING, STARTED TO DO ANYTHING, OR PREPARED TO DO ANYTHING TO END YOUR LIFE?: NO
1. IN THE PAST MONTH, HAVE YOU WISHED YOU WERE DEAD OR WISHED YOU COULD GO TO SLEEP AND NOT WAKE UP?: NO

## 2025-08-31 ASSESSMENT — ENCOUNTER SYMPTOMS
MYALGIAS: 0
FEVER: 0
ARTHRALGIAS: 1
CHILLS: 0

## 2025-08-31 ASSESSMENT — ACTIVITIES OF DAILY LIVING (ADL)
ADLS_ACUITY_SCORE: 50

## (undated) DEVICE — IRRIGATION-H2O 1000ML

## (undated) DEVICE — IRRIGATION-NACL 1000ML

## (undated) DEVICE — GLV-8.0 PROTEXIS PI BLUE W/NEU-THERA LF/PF

## (undated) DEVICE — DRSG-AQUACEL AG 3.5" X 10" SURGICAL

## (undated) DEVICE — Device

## (undated) DEVICE — BDG-COBAN 6 INCH

## (undated) DEVICE — IMMOB-KNEE 20 INCH

## (undated) DEVICE — PACK-HIP-CUSTOM

## (undated) DEVICE — TAPE-MICROFOAM 4 INCH

## (undated) DEVICE — LABEL-STERILE PREPRINTED FOR OR

## (undated) DEVICE — NDL-18G 1 1/2" NON-SAFETY

## (undated) DEVICE — SENSOR-OXISENSOR II ADULT

## (undated) DEVICE — CAUTERY PAD-POLYHESIVE II ADULT

## (undated) DEVICE — BONE WAX W31G

## (undated) DEVICE — SUTURE-VICRYL 2-0 CT-1 VCP259H

## (undated) DEVICE — CAUTERY PENCIL-SMOKE EVACUATION

## (undated) DEVICE — STAPLER-SKIN 35 WIDE STAPLES

## (undated) DEVICE — SUTURE-VICRYL 0 CP-1 VCP267H

## (undated) DEVICE — APPLICATOR-CHLORAPREP 26ML TINTED CHG 2%+ 70% IPA-SURGICAL

## (undated) DEVICE — BLADE-SAGITTAL 18MM X 90MM X 1.27MM

## (undated) DEVICE — SCD SLEEVE-KNEE REG.

## (undated) DEVICE — SYRINGE-20CC LUER LOCK

## (undated) DEVICE — AQUAMANTYS 6.0MM SEALER PROBE

## (undated) DEVICE — DRAPE-STERI-U POUCH

## (undated) DEVICE — IRRIGATION-NACL 3000ML (BAG)

## (undated) DEVICE — GLV-8.0 ORTHO PROTEXIS PI LF/PF

## (undated) DEVICE — DRSG-AQUACEL AG 3.5" X 14" SURGICAL

## (undated) DEVICE — DRAPE-U DRAPE-CLEAR 47" X 51"

## (undated) DEVICE — TAPE-DURAPORE 2 INCH

## (undated) DEVICE — BETADINE 5% STERILE OPHTHALMIC SOLUTION 1 OZ.

## (undated) DEVICE — GLV-8.5 PROTEXIS PI BLUE W/NEU-THERA LF/PF

## (undated) DEVICE — DRAPE-VERTICAL ISOLATION

## (undated) DEVICE — KIT-HANA OR POSITIONING

## (undated) DEVICE — GLV-8.5 ORTHO PROTEXIS PI LF/PF

## (undated) DEVICE — SUTURE-VICRYL 2-0 CP-1 VCP266H

## (undated) DEVICE — SUTURE-VICRYL #1 CP-1 VIOLET J468H

## (undated) DEVICE — SUTURE-VICRYL #1 CT-1 UNDYED J261H

## (undated) DEVICE — FLEXIBLE DRILL-25MM

## (undated) DEVICE — LIGHT HANDLE COVER

## (undated) DEVICE — DRAPE-U DRAPE SPLIT SHEET 72" X 122"

## (undated) RX ORDER — ONDANSETRON 2 MG/ML
INJECTION INTRAMUSCULAR; INTRAVENOUS
Status: DISPENSED
Start: 2019-06-25

## (undated) RX ORDER — GLYCOPYRROLATE 0.2 MG/ML
INJECTION, SOLUTION INTRAMUSCULAR; INTRAVENOUS
Status: DISPENSED
Start: 2019-08-06

## (undated) RX ORDER — LIDOCAINE HYDROCHLORIDE 10 MG/ML
INJECTION, SOLUTION INFILTRATION; PERINEURAL
Status: DISPENSED
Start: 2022-11-25

## (undated) RX ORDER — LIDOCAINE HYDROCHLORIDE 10 MG/ML
INJECTION, SOLUTION EPIDURAL; INFILTRATION; INTRACAUDAL; PERINEURAL
Status: DISPENSED
Start: 2021-10-19

## (undated) RX ORDER — DEXAMETHASONE SODIUM PHOSPHATE 10 MG/ML
INJECTION, SOLUTION INTRAMUSCULAR; INTRAVENOUS
Status: DISPENSED
Start: 2022-11-25

## (undated) RX ORDER — LIDOCAINE HYDROCHLORIDE 10 MG/ML
INJECTION, SOLUTION INFILTRATION; PERINEURAL
Status: DISPENSED
Start: 2021-10-19

## (undated) RX ORDER — FENTANYL CITRATE 50 UG/ML
INJECTION, SOLUTION INTRAMUSCULAR; INTRAVENOUS
Status: DISPENSED
Start: 2019-08-06

## (undated) RX ORDER — DEXAMETHASONE SODIUM PHOSPHATE 10 MG/ML
INJECTION, SOLUTION INTRAMUSCULAR; INTRAVENOUS
Status: DISPENSED
Start: 2021-10-19

## (undated) RX ORDER — PROPOFOL 10 MG/ML
INJECTION, EMULSION INTRAVENOUS
Status: DISPENSED
Start: 2019-08-06

## (undated) RX ORDER — LIDOCAINE HYDROCHLORIDE 10 MG/ML
INJECTION, SOLUTION EPIDURAL; INFILTRATION; INTRACAUDAL; PERINEURAL
Status: DISPENSED
Start: 2022-06-15

## (undated) RX ORDER — DEXAMETHASONE SODIUM PHOSPHATE 10 MG/ML
INJECTION, SOLUTION INTRAMUSCULAR; INTRAVENOUS
Status: DISPENSED
Start: 2019-06-25

## (undated) RX ORDER — LIDOCAINE HYDROCHLORIDE 10 MG/ML
INJECTION, SOLUTION INFILTRATION; PERINEURAL
Status: DISPENSED
Start: 2022-06-15

## (undated) RX ORDER — DEXAMETHASONE SODIUM PHOSPHATE 10 MG/ML
INJECTION, SOLUTION INTRAMUSCULAR; INTRAVENOUS
Status: DISPENSED
Start: 2019-08-06

## (undated) RX ORDER — LIDOCAINE HYDROCHLORIDE 10 MG/ML
INJECTION, SOLUTION EPIDURAL; INFILTRATION; INTRACAUDAL; PERINEURAL
Status: DISPENSED
Start: 2022-11-25

## (undated) RX ORDER — PROPOFOL 10 MG/ML
INJECTION, EMULSION INTRAVENOUS
Status: DISPENSED
Start: 2019-06-25

## (undated) RX ORDER — ESMOLOL HYDROCHLORIDE 10 MG/ML
INJECTION INTRAVENOUS
Status: DISPENSED
Start: 2019-08-06

## (undated) RX ORDER — FENTANYL CITRATE 50 UG/ML
INJECTION, SOLUTION INTRAMUSCULAR; INTRAVENOUS
Status: DISPENSED
Start: 2019-06-25

## (undated) RX ORDER — NEOSTIGMINE METHYLSULFATE 1 MG/ML
VIAL (ML) INJECTION
Status: DISPENSED
Start: 2019-08-06

## (undated) RX ORDER — LIDOCAINE HYDROCHLORIDE 20 MG/ML
INJECTION, SOLUTION EPIDURAL; INFILTRATION; INTRACAUDAL; PERINEURAL
Status: DISPENSED
Start: 2019-08-06

## (undated) RX ORDER — ONDANSETRON 2 MG/ML
INJECTION INTRAMUSCULAR; INTRAVENOUS
Status: DISPENSED
Start: 2019-08-06

## (undated) RX ORDER — DEXAMETHASONE SODIUM PHOSPHATE 10 MG/ML
INJECTION, SOLUTION INTRAMUSCULAR; INTRAVENOUS
Status: DISPENSED
Start: 2022-06-15

## (undated) RX ORDER — LIDOCAINE HYDROCHLORIDE 20 MG/ML
INJECTION, SOLUTION EPIDURAL; INFILTRATION; INTRACAUDAL; PERINEURAL
Status: DISPENSED
Start: 2019-06-25